# Patient Record
Sex: FEMALE | Race: WHITE | NOT HISPANIC OR LATINO | Employment: OTHER | URBAN - METROPOLITAN AREA
[De-identification: names, ages, dates, MRNs, and addresses within clinical notes are randomized per-mention and may not be internally consistent; named-entity substitution may affect disease eponyms.]

---

## 2018-06-01 LAB — HBA1C MFR BLD HPLC: 10.3 %

## 2019-07-11 PROBLEM — K21.9 LARYNGOPHARYNGEAL REFLUX (LPR): Status: ACTIVE | Noted: 2019-07-11

## 2019-07-11 PROBLEM — R06.83 SNORING: Status: ACTIVE | Noted: 2019-07-11

## 2019-07-11 PROBLEM — H10.13 ALLERGIC CONJUNCTIVITIS OF BOTH EYES: Status: ACTIVE | Noted: 2019-07-11

## 2019-07-11 PROBLEM — R51.9 NONINTRACTABLE HEADACHE: Status: ACTIVE | Noted: 2019-07-11

## 2019-07-11 PROBLEM — J30.9 ALLERGIC RHINITIS: Status: ACTIVE | Noted: 2019-07-11

## 2019-07-11 PROBLEM — L20.84 INTRINSIC ATOPIC DERMATITIS: Status: ACTIVE | Noted: 2019-07-11

## 2019-07-11 PROBLEM — J30.0 VASOMOTOR RHINITIS: Status: ACTIVE | Noted: 2019-07-11

## 2020-01-08 NOTE — PROGRESS NOTES
BMI Counseling: Body mass index is 32 54 kg/m²  The BMI is above normal  Nutrition recommendations include 3-5 servings of fruits/vegetables daily, increasing intake of lean protein and reducing intake of saturated fat and trans fat  Assessment/Plan:    Will discontinue glimepiride and start metformin 1000 b i d  With food  Patient was advised to continue with regular low carb diet  Continue to keep hydrated  UTIs treated with Macrobid twice a day for 5 days patient states no history of medication allergy in the past other than the glimepiride causing worsening of her heartburn  Patient will have a urine culture that is pending at this point will address when results available  Lab work has been ordered today and patient is encouraged to get the blood work done fasting prior to next visit in 4 weeks  Patient was advised to call back or return to office if any worsening of condition  Discussed the plan of care with patient in detail patient and her daughter are both appreciative and agreeable with the plan of care       Problem List Items Addressed This Visit        Cardiovascular and Mediastinum    Benign essential hypertension - Primary    Relevant Orders    CBC and differential    Comprehensive metabolic panel    TSH, 3rd generation with Free T4 reflex    Lipid panel    HEMOGLOBIN A1C W/ EAG ESTIMATION    Microalbumin / creatinine urine ratio       Other    Mixed hyperlipidemia    Relevant Medications    lovastatin (MEVACOR) 20 mg tablet    Other Relevant Orders    CBC and differential    Comprehensive metabolic panel    TSH, 3rd generation with Free T4 reflex    Lipid panel    HEMOGLOBIN A1C W/ EAG ESTIMATION    Microalbumin / creatinine urine ratio      Other Visit Diagnoses     Type 2 diabetes mellitus with other specified complication, without long-term current use of insulin (HCC)        Relevant Medications    metFORMIN (GLUCOPHAGE) 1000 MG tablet    Other Relevant Orders    POCT hemoglobin A1c (Completed)    CBC and differential    Comprehensive metabolic panel    TSH, 3rd generation with Free T4 reflex    Lipid panel    HEMOGLOBIN A1C W/ EAG ESTIMATION    Microalbumin / creatinine urine ratio    Ambulatory referral to Ophthalmology    Acute cystitis without hematuria        Relevant Medications    nitrofurantoin (MACROBID) 100 mg capsule    Other Relevant Orders    POCT urine dip auto non-scope (Completed)    Urine culture    Gastroesophageal reflux disease, esophagitis presence not specified        Relevant Medications    pantoprazole (PROTONIX) 20 mg tablet    Other Relevant Orders    CBC and differential    Comprehensive metabolic panel    TSH, 3rd generation with Free T4 reflex    Lipid panel    HEMOGLOBIN A1C W/ EAG ESTIMATION    Microalbumin / creatinine urine ratio            Subjective:      Patient ID: Blanka Leger is a 80 y o  female  Patient is here to establish care with new PCP today  Past medical history significant for diabetes mellitus type 2 hypertension hyperlipidemia gastroesophageal reflux disease  She [facility] generalized primary care needs in the past   Today I do not have any records available for patient  Her concerns today are:   feels that the diabetic medication glimepiride 4 mg which she takes twice a day gives her severe heartburn and that has been ongoing since she has started this medication  Blood sugars are controlled however A1c today as point of care is 9 8   patient would like to have her medication readjusted or switched today she states that even taking a acid reducing medication over-the-counter has not helped to date  Her other issue is worsening of the reflux with the symptoms on daily basis she has been taking the acid medication as mentioned daily and she is does not recall the name of it but a she states that over the counter    She does not recall any stomach pain she also does not recall having any change in bowel habits she does not have any blood in stools either  The following portions of the patient's history were reviewed and updated as appropriate:   She  has a past medical history of Arthritis, Diabetes ([address] ), Glaucoma, Headache, Heartburn, Hypercholesteremia, Hypertension, Hypothyroidism, Migraines, Night sweats, and Weight gain  She   Patient Active Problem List    Diagnosis Date Noted    Allergic conjunctivitis of both eyes 07/11/2019    Allergic rhinitis 07/11/2019    Vasomotor rhinitis 07/11/2019    Nonintractable headache 07/11/2019    Laryngopharyngeal reflux (LPR) 07/11/2019    Snoring 07/11/2019    Intrinsic atopic dermatitis 07/11/2019    Benign essential hypertension 02/27/2012    Mixed hyperlipidemia 02/27/2012    Type II or unspecified type diabetes mellitus without mention of complication, not stated as uncontrolled 02/27/2012     She  has a past surgical history that includes [place]o tooth extraction; Tubal ligation; Cholecystectomy; and Tumor removal (Left)  Her family history includes Allergies in her daughter; Diabetes in her daughter; No Known Problems in her daughter, daughter, daughter, sister, sister, and son; Other in her brother, brother, and mother; Rheumatic fever in her father  She  reports that she has never smoked  She has never used smokeless tobacco  She reports that she drank alcohol  She reports that she does not use drugs    Current Outpatient Medications   Medication Sig Dispense Refill    amLODIPine (NORVASC) 10 mg tablet Take 10 mg by mouth daily  2    FREESTYLE LITE test strip 3 (three) times a day Test  1    levothyroxine 25 mcg tablet Take 25 mcg by mouth daily      lovastatin (MEVACOR) 20 mg tablet       metFORMIN (GLUCOPHAGE) 1000 MG tablet Take 1 tablet (1,000 mg total) by mouth 2 (two) times a day with meals 60 tablet 2    nitrofurantoin (MACROBID) 100 mg capsule Take 1 capsule (100 mg total) by mouth 2 (two) times a day for 5 days 10 capsule 0    pantoprazole (PROTONIX) 20 mg tablet Take 1 tablet (20 mg total) by mouth daily before breakfast 30 tablet 2     No current facility-administered medications for this visit  Current Outpatient Medications on File Prior to Visit   Medication Sig    amLODIPine (NORVASC) 10 mg tablet Take 10 mg by mouth daily    FREESTYLE LITE test strip 3 (three) times a day Test    levothyroxine 25 mcg tablet Take 25 mcg by mouth daily    lovastatin (MEVACOR) 20 mg tablet     [DISCONTINUED] glimepiride (AMARYL) 4 mg tablet Take 8 mg by mouth daily    [DISCONTINUED] azelastine (ASTELIN) 0 1 % nasal spray 1 spray into each nostril 2 (two) times a day Use in each nostril as directed (Patient not taking: Reported on 1/9/2020)    [DISCONTINUED] fenofibrate (TRICOR) 145 mg tablet Take 1 tablet by mouth daily    [DISCONTINUED] gabapentin (NEURONTIN) 100 mg capsule Take by mouth     No current facility-administered medications on file prior to visit  She is allergic to glimepiride       Review of Systems   Constitutional: Negative for chills, fatigue, fever and unexpected weight change  HENT: Negative for congestion  Respiratory: Negative for cough and shortness of breath  Cardiovascular: Negative for chest pain and leg swelling  Gastrointestinal: Negative for abdominal pain, nausea and vomiting  Has GERD and heartburns that is getting worse  Endocrine: Negative for polydipsia, polyphagia and polyuria  Musculoskeletal: Negative for arthralgias and myalgias  Skin: Negative for rash  Neurological: Negative for tremors, weakness, light-headedness and headaches  Objective:      /78 (BP Location: Left arm, Patient Position: Sitting, Cuff Size: Large)   Pulse 62   Temp 98 9 °F (37 2 °C) (Tympanic)   Ht 4' 9 5" (1 461 m)   Wt 69 4 kg (153 lb)   SpO2 95%   BMI 32 54 kg/m²          Physical Exam   Constitutional: She is oriented to person, place, and time   She appears well-developed and well-nourished  No distress  HENT:   Head: Normocephalic and atraumatic  Eyes: Right eye exhibits no discharge  Left eye exhibits no discharge  No scleral icterus  Neck: Normal range of motion  Neck supple  Cardiovascular: Normal rate, regular rhythm and normal heart sounds  No murmur heard  Pulmonary/Chest: Effort normal and breath sounds normal  No respiratory distress  She has no wheezes  Abdominal: Soft  Bowel sounds are normal  She exhibits no distension  There is no tenderness  Musculoskeletal: Normal range of motion  Neurological: She is alert and oriented to person, place, and time  Skin: Skin is warm and dry  No rash noted  She is not diaphoretic  Psychiatric: She has a normal mood and affect  Her behavior is normal    Nursing note and vitals reviewed          Lab Results   Component Value Date    HGBA1C 9 8 (A) 01/09/2020

## 2020-01-09 ENCOUNTER — OFFICE VISIT (OUTPATIENT)
Dept: FAMILY MEDICINE CLINIC | Facility: OTHER | Age: 85
End: 2020-01-09
Payer: MEDICARE

## 2020-01-09 VITALS
HEART RATE: 62 BPM | WEIGHT: 153 LBS | TEMPERATURE: 98.9 F | BODY MASS INDEX: 32.12 KG/M2 | OXYGEN SATURATION: 95 % | SYSTOLIC BLOOD PRESSURE: 138 MMHG | HEIGHT: 58 IN | DIASTOLIC BLOOD PRESSURE: 78 MMHG

## 2020-01-09 DIAGNOSIS — N30.00 ACUTE CYSTITIS WITHOUT HEMATURIA: ICD-10-CM

## 2020-01-09 DIAGNOSIS — E78.2 MIXED HYPERLIPIDEMIA: ICD-10-CM

## 2020-01-09 DIAGNOSIS — I10 BENIGN ESSENTIAL HYPERTENSION: Primary | ICD-10-CM

## 2020-01-09 DIAGNOSIS — K21.9 GASTROESOPHAGEAL REFLUX DISEASE, ESOPHAGITIS PRESENCE NOT SPECIFIED: ICD-10-CM

## 2020-01-09 DIAGNOSIS — E11.69 TYPE 2 DIABETES MELLITUS WITH OTHER SPECIFIED COMPLICATION, WITHOUT LONG-TERM CURRENT USE OF INSULIN (HCC): ICD-10-CM

## 2020-01-09 LAB
CREAT UR-MCNC: 53.1 MG/DL
MICROALBUMIN UR-MCNC: 24 MG/L (ref 0–20)
MICROALBUMIN/CREAT 24H UR: 45 MG/G CREATININE (ref 0–30)
SL AMB  POCT GLUCOSE, UA: ABNORMAL
SL AMB LEUKOCYTE ESTERASE,UA: ABNORMAL
SL AMB POCT BILIRUBIN,UA: ABNORMAL
SL AMB POCT BLOOD,UA: ABNORMAL
SL AMB POCT CLARITY,UA: ABNORMAL
SL AMB POCT COLOR,UA: YELLOW
SL AMB POCT HEMOGLOBIN AIC: 9.8 (ref ?–6.5)
SL AMB POCT KETONES,UA: ABNORMAL
SL AMB POCT NITRITE,UA: ABNORMAL
SL AMB POCT PH,UA: 5.5
SL AMB POCT SPECIFIC GRAVITY,UA: 1.02
SL AMB POCT URINE PROTEIN: ABNORMAL
SL AMB POCT UROBILINOGEN: ABNORMAL

## 2020-01-09 PROCEDURE — 87077 CULTURE AEROBIC IDENTIFY: CPT | Performed by: FAMILY MEDICINE

## 2020-01-09 PROCEDURE — 87086 URINE CULTURE/COLONY COUNT: CPT | Performed by: FAMILY MEDICINE

## 2020-01-09 PROCEDURE — 99203 OFFICE O/P NEW LOW 30 MIN: CPT | Performed by: FAMILY MEDICINE

## 2020-01-09 PROCEDURE — 87186 SC STD MICRODIL/AGAR DIL: CPT | Performed by: FAMILY MEDICINE

## 2020-01-09 PROCEDURE — 83036 HEMOGLOBIN GLYCOSYLATED A1C: CPT | Performed by: FAMILY MEDICINE

## 2020-01-09 PROCEDURE — 81003 URINALYSIS AUTO W/O SCOPE: CPT | Performed by: FAMILY MEDICINE

## 2020-01-09 PROCEDURE — 82043 UR ALBUMIN QUANTITATIVE: CPT | Performed by: FAMILY MEDICINE

## 2020-01-09 PROCEDURE — 82570 ASSAY OF URINE CREATININE: CPT | Performed by: FAMILY MEDICINE

## 2020-01-09 RX ORDER — LOVASTATIN 20 MG/1
TABLET ORAL
COMMUNITY
Start: 2020-01-01 | End: 2020-09-21 | Stop reason: SDUPTHER

## 2020-01-09 RX ORDER — LEVOTHYROXINE SODIUM 0.03 MG/1
25 TABLET ORAL DAILY
COMMUNITY
End: 2020-09-21 | Stop reason: SDUPTHER

## 2020-01-09 RX ORDER — PANTOPRAZOLE SODIUM 20 MG/1
20 TABLET, DELAYED RELEASE ORAL
Qty: 30 TABLET | Refills: 2 | Status: SHIPPED | OUTPATIENT
Start: 2020-01-09 | End: 2020-09-21 | Stop reason: SDUPTHER

## 2020-01-09 RX ORDER — NITROFURANTOIN 25; 75 MG/1; MG/1
100 CAPSULE ORAL 2 TIMES DAILY
Qty: 10 CAPSULE | Refills: 0 | Status: SHIPPED | OUTPATIENT
Start: 2020-01-09 | End: 2020-01-14

## 2020-01-09 NOTE — PATIENT INSTRUCTIONS
Urinary Traction Infection in Older Adults   WHAT YOU NEED TO KNOW:   A urinary tract infection (UTI) is caused by bacteria that get inside your urinary tract  Your urinary tract includes your kidneys, ureters, bladder, and urethra  Urine is made in your kidneys, and it flows from the ureters to the bladder  Urine leaves the bladder through the urethra  A UTI is more common in your lower urinary tract, which includes your bladder and urethra  DISCHARGE INSTRUCTIONS:   Return to the emergency department if:   · You are urinating very little or not at all  · You are vomiting  · You have a high fever with shaking chills  · You have side or back pain that gets worse  Contact your healthcare provider if:   · You have a fever  · You are a woman and you have increased white or yellow discharge from your vagina  · You do not feel better after 2 days of taking antibiotics  · You have questions or concerns about your condition or care  Medicines:   · Medicines  help treat the bacterial infection or decrease pain and burning when you urinate  You may also need medicines to decrease the urge to urinate often  Your healthcare provider may recommend cranberry juice or cranberry supplements to help decrease your symptoms  · Take your medicine as directed  Contact your healthcare provider if you think your medicine is not helping or if you have side effects  Tell him or her if you are allergic to any medicine  Keep a list of the medicines, vitamins, and herbs you take  Include the amounts, and when and why you take them  Bring the list or the pill bottles to follow-up visits  Carry your medicine list with you in case of an emergency  Self-care:   · Urinate when you feel the urge  Do not hold your urine because bacteria can grow in the bladder if urine stays in the bladder too long  It may be helpful to urinate at least every 3 to 4 hours  · Drink liquids as directed    Liquids can help flush bacteria from your urinary tract  Ask how much liquid to drink each day and which liquids are best for you  You may need to drink more liquids than usual to help flush out the bacteria  Do not drink alcohol, caffeine, and citrus juices  These can irritate your bladder and increase your symptoms  · Apply heat  on your abdomen for 20 to 30 minutes every 2 hours for as many days as directed  Heat helps decrease discomfort and pressure in your bladder  Prevent a UTI:   · Women should wipe front to back  after urinating or having a bowel movement  This may prevent germs from getting into the urinary tract  · Urinate after you have sex  to flush away bacteria that can enter your urinary tract during sex  · Wear cotton underwear and clothes that fit loose  Tight pants and nylon underwear can trap moisture and cause bacteria to grow  Follow up with your healthcare provider as directed:  Write down your questions so you remember to ask them during your visits  © 2017 [address] Information is for End User's use only and may not be sold, redistributed or otherwise used for commercial purposes  All illustrations and images included in CareNotes® are the copyrighted property of [employer]  or [employer]  The above information is an  only  It is not intended as medical advice for individual conditions or treatments  Talk to your doctor, nurse or pharmacist before following any medical regimen to see if it is safe and effective for you

## 2020-01-11 LAB
BACTERIA UR CULT: ABNORMAL
BACTERIA UR CULT: ABNORMAL

## 2020-01-14 ENCOUNTER — TELEPHONE (OUTPATIENT)
Dept: FAMILY MEDICINE CLINIC | Facility: OTHER | Age: 85
End: 2020-01-14

## 2020-01-14 NOTE — TELEPHONE ENCOUNTER
Left message for patient    ----- Message from Nell Ornelas MD sent at 1/13/2020  2:21 PM EST -----  The urine test confirms bladder infection  For now take the antibiotic provided last week and finish the full course and keep hydrated  Thanks

## 2020-01-15 ENCOUNTER — TELEPHONE (OUTPATIENT)
Dept: FAMILY MEDICINE CLINIC | Facility: OTHER | Age: 85
End: 2020-01-15

## 2020-01-15 DIAGNOSIS — R30.0 DYSURIA: Primary | ICD-10-CM

## 2020-01-15 NOTE — TELEPHONE ENCOUNTER
Daughter called and the medication for the UTI is not working said it is the wrong med and she would like to know if a UA culture was done due to that is what she needs again per daughter   Patient is still having problems  Please advise   Thank you

## 2020-01-15 NOTE — TELEPHONE ENCOUNTER
Please advise the patient the urine was sent for culture and it shows the medication to be the correct medicine  If she still has the symptoms after taking the medicine I would suggest she gets checked again  I will order Urine test for her to get done at the lab  Advise also to keep her well hydrated  The lab is ordered today  Thanks

## 2020-01-17 LAB — HBA1C MFR BLD HPLC: 10.1 %

## 2020-01-22 ENCOUNTER — TELEPHONE (OUTPATIENT)
Dept: FAMILY MEDICINE CLINIC | Facility: OTHER | Age: 85
End: 2020-01-22

## 2020-01-23 NOTE — TELEPHONE ENCOUNTER
Patients daughter Gwendolyn Cousin was informed and is a little upset that we did not report results of the urine culture  I assured her that we will call her tomorrow morning once it has been reviewed  Please advise

## 2020-01-24 ENCOUNTER — TELEPHONE (OUTPATIENT)
Dept: FAMILY MEDICINE CLINIC | Facility: OTHER | Age: 85
End: 2020-01-24

## 2020-01-24 DIAGNOSIS — R30.0 DYSURIA: Primary | ICD-10-CM

## 2020-01-24 RX ORDER — SULFAMETHOXAZOLE AND TRIMETHOPRIM 800; 160 MG/1; MG/1
1 TABLET ORAL EVERY 12 HOURS SCHEDULED
Qty: 14 TABLET | Refills: 0 | Status: SHIPPED | OUTPATIENT
Start: 2020-01-24 | End: 2020-01-31

## 2020-01-24 NOTE — TELEPHONE ENCOUNTER
Called and spoke with daughter  Apologized for tardiness on lab results  Informed that most recent (1/17) urine culture results came back still positive for infection (E  coli)  Elmo Martinez should have taken care of it  Not sure why she is still having symptoms (dysuria, frequency)  Glucosuria may be contributing  In any case, daughter informed that we will try retreating with a different antibiotic (Bactrim)-->Rx sent to pharmacy  She was instructed to drink plenty of water and call Mon or Tues if symptoms still no better  Daughter reports no previous AE's from Bactrim

## 2020-01-24 NOTE — TELEPHONE ENCOUNTER
Called and spoke with daughter  Apologized for tardiness on lab results  Informed that most recent (1/17) urine culture results came back still positive for infection (E  coli)  Leta Grove should have taken care of it  Not sure why she is still having symptoms (dysuria, frequency)  Glucosuria may be contributing  In any case, daughter informed that we will try retreating with a different antibiotic (Bactrim)-->Rx sent to pharmacy  She was instructed to drink plenty of water and call Mon or Tues if symptoms still no better

## 2020-01-24 NOTE — TELEPHONE ENCOUNTER
Received another call from patient daughter Christy Luque asking about urine results  Patient took macrobid as prescribed but still having symptoms  Shes upset that we received the results on 1/20 but didn't get a call from our office, she had to call us to get results on 1/22  Result notes from provider do not mention results of urine only blood work  Apologized to daughter and stated would try to have another provider take a look and give her a call back before end of day

## 2020-01-27 NOTE — TELEPHONE ENCOUNTER
Staff: The urine culture result was resulted separate time from the rest of  Labs  The culture results were communicated per the telephone note in the encounters by Donald Lance on 1/15/20 to patient's daughter  I had ordered a repeat urine testing on that day to have her checked for residual infection  Annmarie Shelby: Thanks for addressing the lab

## 2020-02-26 ENCOUNTER — APPOINTMENT (OUTPATIENT)
Dept: LAB | Facility: CLINIC | Age: 85
End: 2020-02-26
Payer: MEDICARE

## 2020-02-26 ENCOUNTER — TRANSCRIBE ORDERS (OUTPATIENT)
Dept: LAB | Facility: CLINIC | Age: 85
End: 2020-02-26

## 2020-02-26 ENCOUNTER — OFFICE VISIT (OUTPATIENT)
Dept: FAMILY MEDICINE CLINIC | Facility: OTHER | Age: 85
End: 2020-02-26
Payer: MEDICARE

## 2020-02-26 VITALS
OXYGEN SATURATION: 94 % | HEART RATE: 65 BPM | HEIGHT: 58 IN | BODY MASS INDEX: 32.01 KG/M2 | SYSTOLIC BLOOD PRESSURE: 130 MMHG | WEIGHT: 152.5 LBS | DIASTOLIC BLOOD PRESSURE: 70 MMHG | TEMPERATURE: 99.2 F

## 2020-02-26 DIAGNOSIS — E11.69 TYPE 2 DIABETES MELLITUS WITH OTHER SPECIFIED COMPLICATION, WITHOUT LONG-TERM CURRENT USE OF INSULIN (HCC): Primary | ICD-10-CM

## 2020-02-26 DIAGNOSIS — N30.00 ACUTE CYSTITIS WITHOUT HEMATURIA: ICD-10-CM

## 2020-02-26 LAB
BACTERIA UR QL AUTO: ABNORMAL /HPF
BILIRUB UR QL STRIP: NEGATIVE
CLARITY UR: CLEAR
COLOR UR: YELLOW
GLUCOSE UR STRIP-MCNC: ABNORMAL MG/DL
HGB UR QL STRIP.AUTO: NEGATIVE
KETONES UR STRIP-MCNC: NEGATIVE MG/DL
LEUKOCYTE ESTERASE UR QL STRIP: ABNORMAL
NITRITE UR QL STRIP: NEGATIVE
NON-SQ EPI CELLS URNS QL MICRO: ABNORMAL /HPF
OTHER STN SPEC: ABNORMAL
PH UR STRIP.AUTO: 5.5 [PH]
PROT UR STRIP-MCNC: NEGATIVE MG/DL
RBC #/AREA URNS AUTO: ABNORMAL /HPF
SP GR UR STRIP.AUTO: 1.01 (ref 1–1.03)
UROBILINOGEN UR QL STRIP.AUTO: 0.2 E.U./DL
WBC #/AREA URNS AUTO: ABNORMAL /HPF

## 2020-02-26 PROCEDURE — 99214 OFFICE O/P EST MOD 30 MIN: CPT | Performed by: FAMILY MEDICINE

## 2020-02-26 PROCEDURE — 3078F DIAST BP <80 MM HG: CPT | Performed by: FAMILY MEDICINE

## 2020-02-26 PROCEDURE — 87086 URINE CULTURE/COLONY COUNT: CPT | Performed by: FAMILY MEDICINE

## 2020-02-26 PROCEDURE — 4040F PNEUMOC VAC/ADMIN/RCVD: CPT | Performed by: FAMILY MEDICINE

## 2020-02-26 PROCEDURE — 1036F TOBACCO NON-USER: CPT | Performed by: FAMILY MEDICINE

## 2020-02-26 PROCEDURE — 81001 URINALYSIS AUTO W/SCOPE: CPT | Performed by: FAMILY MEDICINE

## 2020-02-26 PROCEDURE — 3008F BODY MASS INDEX DOCD: CPT | Performed by: FAMILY MEDICINE

## 2020-02-26 PROCEDURE — 3060F POS MICROALBUMINURIA REV: CPT | Performed by: FAMILY MEDICINE

## 2020-02-26 PROCEDURE — 3046F HEMOGLOBIN A1C LEVEL >9.0%: CPT | Performed by: FAMILY MEDICINE

## 2020-02-26 PROCEDURE — 1160F RVW MEDS BY RX/DR IN RCRD: CPT | Performed by: FAMILY MEDICINE

## 2020-02-26 PROCEDURE — 3075F SYST BP GE 130 - 139MM HG: CPT | Performed by: FAMILY MEDICINE

## 2020-02-26 RX ORDER — GLIPIZIDE 5 MG/1
5 TABLET ORAL
Qty: 30 TABLET | Refills: 1 | Status: CANCELLED | OUTPATIENT
Start: 2020-02-26

## 2020-02-26 RX ORDER — GLIMEPIRIDE 4 MG/1
4 TABLET ORAL 2 TIMES DAILY
Qty: 60 TABLET | Refills: 1 | COMMUNITY
Start: 2020-02-26 | End: 2020-03-27

## 2020-02-26 NOTE — PROGRESS NOTES
Assessment/Plan:  Benito Collado was seen today for diabetes  Diagnoses and all orders for this visit:    Type 2 diabetes mellitus with other specified complication, without long-term current use of insulin ([address] )  -     HEMOGLOBIN A1C W/ EAG ESTIMATION; Future  -     Comprehensive metabolic panel; Future  -     sitaGLIPtin (JANUVIA) 25 mg tablet; Take 1 tablet (25 mg total) by mouth daily    Acute cystitis without hematuria  -     UA w Reflex to Microscopic w Reflex to Culture -Lab Collect  -     Urine Microscopic  -     Urine culture; Future  -     Urine culture      BMI Counseling: Body mass index is 32 43 kg/m²  The BMI is above normal  Nutrition recommendations include reducing portion sizes, decreasing overall calorie intake, 3-5 servings of fruits/vegetables daily, reducing fast food intake, consuming healthier snacks, decreasing soda and/or juice intake, moderation in carbohydrate intake and increasing intake of lean protein  Patient is a well appearing 80year old woman who presents for T2DM follow up  Patient admittedly has very poor control of diabetes and last month's A1C was 10 1  Patient is reportedly taking Glimeperide 4mg b i d  She refused to begin Insulin so she will begin Januvia 25mg once daily  Will closely follow  Patient also to go to lab for urine culture today due previously diagnosed cystitis last month as she is still experiencing symptoms  Will follow up result  Patient instructed to begin wearing compressions stockings during the day and to elevate legs throughout the day due to bilateral leg swelling  Return in about 2 weeks (around 3/11/2020) for T2DM   The patient indicates understanding of these issues and agrees with the plan  Subjective:      Patient ID: Virginie Lucio is a 80 y o  female  Diabetes   She presents for her follow-up diabetic visit  She has type 2 diabetes mellitus  No MedicAlert identification noted  Her disease course has been worsening   Hypoglycemia symptoms include dizziness (  upon standing  Chronic issue of unknown origin )  Pertinent negatives for hypoglycemia include no confusion, headaches, hunger, mood changes, nervousness/anxiousness, pallor, seizures, sweats or tremors  Pertinent negatives for diabetes include no blurred vision, no chest pain, no fatigue, no foot paresthesias, no foot ulcerations, no polydipsia, no polyphagia, no polyuria, no visual change, no weakness and no weight loss  There are no hypoglycemic complications  Symptoms are stable  There are no diabetic complications  Risk factors for coronary artery disease include diabetes mellitus, obesity, sedentary lifestyle, post-menopausal, hypertension and dyslipidemia  Current diabetic treatment includes oral agent (monotherapy)  She is compliant with treatment all of the time  Her weight is stable  She is following a generally unhealthy diet  When asked about meal planning, she reported none  She has not had a previous visit with a dietitian  She never participates in exercise  Her home blood glucose trend is increasing rapidly  Her breakfast blood glucose range is generally 180-200 mg/dl  (Occasionally over 200) An ACE inhibitor/angiotensin II receptor blocker is not being taken  She does not see a podiatrist Eye exam is not current  The following portions of the patient's history were reviewed and updated as appropriate: allergies, current medications, past family history, past medical history, past social history, past surgical history and problem list        Review of Systems   Constitutional: Positive for activity change  Negative for appetite change, chills, fatigue, fever and weight loss  HENT: Positive for ear pain  Negative for congestion, ear discharge, rhinorrhea, sneezing and sore throat  Eyes: Negative for blurred vision  Respiratory: Negative for cough, chest tightness and shortness of breath  Cardiovascular: Positive for leg swelling (Bilaterally )   Negative for chest pain and palpitations  Gastrointestinal: Negative for abdominal pain, constipation, diarrhea, nausea and vomiting  Endocrine: Negative for polydipsia, polyphagia and polyuria  Genitourinary: Positive for dysuria  Skin: Negative for pallor  Neurological: Positive for dizziness (  upon standing  Chronic issue of unknown origin )  Negative for tremors, seizures, weakness and headaches  Psychiatric/Behavioral: Negative for confusion and sleep disturbance  The patient is not nervous/anxious  Objective:  /70 (BP Location: Left arm, Patient Position: Sitting, Cuff Size: Large)   Pulse 65   Temp 99 2 °F (37 3 °C) (Tympanic)   Ht 4' 9 5" (1 461 m)   Wt 69 2 kg (152 lb 8 oz)   SpO2 94%   BMI 32 43 kg/m²        Physical Exam   Constitutional: She is oriented to person, place, and time  She appears well-developed and well-nourished  No distress  HENT:   Head: Normocephalic and atraumatic  Left Ear: External ear normal    Nose: Nose normal    Mouth/Throat: Oropharynx is clear and moist    1mm pimple like bump with yellow head in R external ear canal    Eyes: Pupils are equal, round, and reactive to light  Conjunctivae and EOM are normal  Right eye exhibits no discharge  Left eye exhibits no discharge  No scleral icterus  Neck: Normal range of motion  Neck supple  Cardiovascular: Normal rate, regular rhythm, normal heart sounds and intact distal pulses  Pulses are no weak pulses  No murmur heard  Pulses:       Dorsalis pedis pulses are 2+ on the right side, and 2+ on the left side  Posterior tibial pulses are 2+ on the right side, and 2+ on the left side  Pulmonary/Chest: Effort normal and breath sounds normal  No respiratory distress  She has no wheezes  Musculoskeletal: Normal range of motion  She exhibits edema (  1+ pitting edema BL LE)  She exhibits no tenderness or deformity     Feet:   Right Foot:   Skin Integrity: Negative for ulcer, skin breakdown, erythema, warmth, callus or dry skin  Left Foot:   Skin Integrity: Negative for ulcer, skin breakdown, erythema, warmth, callus or dry skin  Neurological: She is alert and oriented to person, place, and time  No cranial nerve deficit  Skin: Skin is warm and dry  Capillary refill takes less than 2 seconds  She is not diaphoretic  Psychiatric: She has a normal mood and affect  Vitals reviewed  Patient's shoes and socks removed  Right Foot/Ankle   Right Foot Inspection  Skin Exam: skin normal and skin intact no dry skin, no warmth, no callus, no erythema, no maceration, no abnormal color, no pre-ulcer, no ulcer and no callus                          Toe Exam: ROM and strength within normal limits  Sensory   Vibration: intact  Proprioception: intact   Monofilament testing: intact  Vascular  Capillary refills: < 3 seconds  The right DP pulse is 2+  The right PT pulse is 2+  Left Foot/Ankle  Left Foot Inspection  Skin Exam: skin normal and skin intactno dry skin, no warmth, no erythema, no maceration, normal color, no pre-ulcer, no ulcer and no callus                         Toe Exam: ROM and strength within normal limits                   Sensory   Vibration: intact  Proprioception: intact  Monofilament: intact  Vascular  Capillary refills: < 3 seconds  The left DP pulse is 2+  The left PT pulse is 2+  Assign Risk Category:  No deformity present; No loss of protective sensation;  No weak pulses       Risk: 0

## 2020-02-27 LAB — BACTERIA UR CULT: NORMAL

## 2020-02-27 RX ORDER — CEPHALEXIN 500 MG/1
500 CAPSULE ORAL 2 TIMES DAILY
Qty: 40 CAPSULE | Refills: 0 | Status: SHIPPED | OUTPATIENT
Start: 2020-02-27 | End: 2020-03-05

## 2020-03-25 ENCOUNTER — TELEPHONE (OUTPATIENT)
Dept: FAMILY MEDICINE CLINIC | Facility: OTHER | Age: 85
End: 2020-03-25

## 2020-03-27 DIAGNOSIS — E11.69 TYPE 2 DIABETES MELLITUS WITH OTHER SPECIFIED COMPLICATION, WITHOUT LONG-TERM CURRENT USE OF INSULIN (HCC): Primary | ICD-10-CM

## 2020-03-27 RX ORDER — GLIMEPIRIDE 4 MG/1
TABLET ORAL
Qty: 60 TABLET | Refills: 1 | Status: SHIPPED | OUTPATIENT
Start: 2020-03-27 | End: 2020-04-24

## 2020-03-30 DIAGNOSIS — E11.69 TYPE 2 DIABETES MELLITUS WITH OTHER SPECIFIED COMPLICATION, WITHOUT LONG-TERM CURRENT USE OF INSULIN (HCC): ICD-10-CM

## 2020-03-30 DIAGNOSIS — K21.9 GASTROESOPHAGEAL REFLUX DISEASE, ESOPHAGITIS PRESENCE NOT SPECIFIED: ICD-10-CM

## 2020-03-30 RX ORDER — PANTOPRAZOLE SODIUM 20 MG/1
20 TABLET, DELAYED RELEASE ORAL
Qty: 90 TABLET | Refills: 0 | OUTPATIENT
Start: 2020-03-30

## 2020-04-06 ENCOUNTER — TELEPHONE (OUTPATIENT)
Dept: FAMILY MEDICINE CLINIC | Facility: CLINIC | Age: 85
End: 2020-04-06

## 2020-04-06 DIAGNOSIS — N39.0 URINARY TRACT INFECTION WITHOUT HEMATURIA, SITE UNSPECIFIED: Primary | ICD-10-CM

## 2020-04-06 RX ORDER — SULFAMETHOXAZOLE AND TRIMETHOPRIM 800; 160 MG/1; MG/1
1 TABLET ORAL 2 TIMES DAILY
Qty: 6 TABLET | Refills: 0 | Status: SHIPPED | OUTPATIENT
Start: 2020-04-06 | End: 2020-04-09

## 2020-04-13 ENCOUNTER — TELEMEDICINE (OUTPATIENT)
Dept: FAMILY MEDICINE CLINIC | Facility: CLINIC | Age: 85
End: 2020-04-13
Payer: MEDICARE

## 2020-04-13 VITALS — TEMPERATURE: 96.6 F | WEIGHT: 160 LBS | HEIGHT: 59 IN | BODY MASS INDEX: 32.25 KG/M2

## 2020-04-13 DIAGNOSIS — Z87.440 HISTORY OF RECURRENT UTI (URINARY TRACT INFECTION): Primary | ICD-10-CM

## 2020-04-13 DIAGNOSIS — N39.0 URINARY TRACT INFECTION, SITE NOT SPECIFIED: ICD-10-CM

## 2020-04-13 PROCEDURE — 99442 PR PHYS/QHP TELEPHONE EVALUATION 11-20 MIN: CPT | Performed by: NURSE PRACTITIONER

## 2020-04-13 RX ORDER — METFORMIN HYDROCHLORIDE 750 MG/1
750 TABLET, EXTENDED RELEASE ORAL DAILY
COMMUNITY
Start: 2020-03-18 | End: 2020-06-09

## 2020-04-13 RX ORDER — SULFAMETHOXAZOLE AND TRIMETHOPRIM 400; 80 MG/1; MG/1
TABLET ORAL
Qty: 12 TABLET | Refills: 0 | Status: SHIPPED | OUTPATIENT
Start: 2020-04-13 | End: 2020-04-16

## 2020-04-20 ENCOUNTER — TELEPHONE (OUTPATIENT)
Dept: FAMILY MEDICINE CLINIC | Facility: OTHER | Age: 85
End: 2020-04-20

## 2020-04-24 DIAGNOSIS — E11.69 TYPE 2 DIABETES MELLITUS WITH OTHER SPECIFIED COMPLICATION, WITHOUT LONG-TERM CURRENT USE OF INSULIN (HCC): ICD-10-CM

## 2020-04-24 RX ORDER — GLIMEPIRIDE 4 MG/1
TABLET ORAL
Qty: 60 TABLET | Refills: 1 | Status: SHIPPED | OUTPATIENT
Start: 2020-04-24 | End: 2020-06-08

## 2020-04-28 ENCOUNTER — TELEPHONE (OUTPATIENT)
Dept: FAMILY MEDICINE CLINIC | Facility: OTHER | Age: 85
End: 2020-04-28

## 2020-05-14 ENCOUNTER — TELEPHONE (OUTPATIENT)
Dept: FAMILY MEDICINE CLINIC | Facility: OTHER | Age: 85
End: 2020-05-14

## 2020-06-06 DIAGNOSIS — E11.69 TYPE 2 DIABETES MELLITUS WITH OTHER SPECIFIED COMPLICATION, WITHOUT LONG-TERM CURRENT USE OF INSULIN (HCC): ICD-10-CM

## 2020-06-08 RX ORDER — GLIMEPIRIDE 4 MG/1
TABLET ORAL
Qty: 60 TABLET | Refills: 1 | Status: SHIPPED | OUTPATIENT
Start: 2020-06-08 | End: 2020-06-10

## 2020-06-09 DIAGNOSIS — E11.9 TYPE 2 DIABETES MELLITUS WITHOUT COMPLICATIONS (HCC): ICD-10-CM

## 2020-06-09 RX ORDER — METFORMIN HYDROCHLORIDE 750 MG/1
TABLET, EXTENDED RELEASE ORAL
Qty: 90 TABLET | Refills: 0 | Status: SHIPPED | OUTPATIENT
Start: 2020-06-09 | End: 2020-09-21

## 2020-06-10 DIAGNOSIS — E11.69 TYPE 2 DIABETES MELLITUS WITH OTHER SPECIFIED COMPLICATION, WITHOUT LONG-TERM CURRENT USE OF INSULIN (HCC): ICD-10-CM

## 2020-06-10 RX ORDER — GLIMEPIRIDE 4 MG/1
TABLET ORAL
Qty: 180 TABLET | Refills: 1 | Status: SHIPPED | OUTPATIENT
Start: 2020-06-10 | End: 2020-09-21 | Stop reason: SDUPTHER

## 2020-07-10 ENCOUNTER — TELEPHONE (OUTPATIENT)
Dept: FAMILY MEDICINE CLINIC | Facility: OTHER | Age: 85
End: 2020-07-10

## 2020-07-20 ENCOUNTER — APPOINTMENT (EMERGENCY)
Dept: VASCULAR ULTRASOUND | Facility: HOSPITAL | Age: 85
End: 2020-07-20
Payer: MEDICARE

## 2020-07-20 ENCOUNTER — HOSPITAL ENCOUNTER (EMERGENCY)
Facility: HOSPITAL | Age: 85
Discharge: HOME | End: 2020-07-20
Payer: MEDICARE

## 2020-07-20 VITALS
RESPIRATION RATE: 20 BRPM | OXYGEN SATURATION: 96 % | DIASTOLIC BLOOD PRESSURE: 58 MMHG | WEIGHT: 147.71 LBS | TEMPERATURE: 97.4 F | SYSTOLIC BLOOD PRESSURE: 172 MMHG | BODY MASS INDEX: 29.83 KG/M2 | HEART RATE: 58 BPM

## 2020-07-20 DIAGNOSIS — S86.912A MUSCLE STRAIN OF LEFT LOWER LEG, INITIAL ENCOUNTER: Primary | ICD-10-CM

## 2020-07-20 LAB
ALBUMIN SERPL BCP-MCNC: 4.1 G/DL (ref 3.4–4.8)
ALP SERPL-CCNC: 59.3 U/L (ref 35–140)
ALT SERPL W P-5'-P-CCNC: 23 U/L (ref 5–54)
ANION GAP SERPL CALCULATED.3IONS-SCNC: 8 MMOL/L (ref 4–13)
APTT PPP: 29 SECONDS (ref 23–31)
AST SERPL W P-5'-P-CCNC: 22 U/L (ref 15–41)
BASOPHILS # BLD AUTO: 0.02 THOUSANDS/ΜL (ref 0–0.1)
BASOPHILS NFR BLD AUTO: 0 % (ref 0–1)
BILIRUB SERPL-MCNC: 0.39 MG/DL (ref 0.3–1.2)
BUN SERPL-MCNC: 12 MG/DL (ref 6–20)
CALCIUM SERPL-MCNC: 10.2 MG/DL (ref 8.4–10.2)
CHLORIDE SERPL-SCNC: 101 MMOL/L (ref 96–108)
CO2 SERPL-SCNC: 26 MMOL/L (ref 22–33)
CREAT SERPL-MCNC: 0.66 MG/DL (ref 0.4–1.1)
D DIMER PPP FEU-MCNC: 0.44 MG/L FEU (ref 0.19–0.49)
EOSINOPHIL # BLD AUTO: 0.21 THOUSAND/ΜL (ref 0–0.61)
EOSINOPHIL NFR BLD AUTO: 2 % (ref 0–6)
ERYTHROCYTE [DISTWIDTH] IN BLOOD BY AUTOMATED COUNT: 12.1 % (ref 11.6–15.1)
GFR SERPL CREATININE-BSD FRML MDRD: 76 ML/MIN/1.73SQ M
GLUCOSE SERPL-MCNC: 141 MG/DL (ref 65–140)
HCT VFR BLD AUTO: 37.9 % (ref 34.8–46.1)
HGB BLD-MCNC: 12.9 G/DL (ref 11.5–15.4)
IMM GRANULOCYTES # BLD AUTO: 0.03 THOUSAND/UL (ref 0–0.2)
IMM GRANULOCYTES NFR BLD AUTO: 0 % (ref 0–2)
INR PPP: 1.03 (ref 0.9–1.1)
LYMPHOCYTES # BLD AUTO: 2.26 THOUSANDS/ΜL (ref 0.6–4.47)
LYMPHOCYTES NFR BLD AUTO: 25 % (ref 14–44)
MCH RBC QN AUTO: 30.6 PG (ref 26.8–34.3)
MCHC RBC AUTO-ENTMCNC: 34 G/DL (ref 31.4–37.4)
MCV RBC AUTO: 90 FL (ref 82–98)
MONOCYTES # BLD AUTO: 1.21 THOUSAND/ΜL (ref 0.17–1.22)
MONOCYTES NFR BLD AUTO: 13 % (ref 4–12)
NEUTROPHILS # BLD AUTO: 5.48 THOUSANDS/ΜL (ref 1.85–7.62)
NEUTS SEG NFR BLD AUTO: 60 % (ref 43–75)
PLATELET # BLD AUTO: 226 THOUSANDS/UL (ref 149–390)
PMV BLD AUTO: 9.5 FL (ref 8.9–12.7)
POTASSIUM SERPL-SCNC: 3.8 MMOL/L (ref 3.5–5)
PROT SERPL-MCNC: 7 G/DL (ref 6.4–8.3)
PROTHROMBIN TIME: 10.9 SECONDS (ref 9.5–12.1)
RBC # BLD AUTO: 4.21 MILLION/UL (ref 3.81–5.12)
SODIUM SERPL-SCNC: 135 MMOL/L (ref 133–145)
WBC # BLD AUTO: 9.21 THOUSAND/UL (ref 4.31–10.16)

## 2020-07-20 PROCEDURE — 80053 COMPREHEN METABOLIC PANEL: CPT

## 2020-07-20 PROCEDURE — 99282 EMERGENCY DEPT VISIT SF MDM: CPT

## 2020-07-20 PROCEDURE — 85730 THROMBOPLASTIN TIME PARTIAL: CPT

## 2020-07-20 PROCEDURE — 85610 PROTHROMBIN TIME: CPT

## 2020-07-20 PROCEDURE — 99284 EMERGENCY DEPT VISIT MOD MDM: CPT

## 2020-07-20 PROCEDURE — 85379 FIBRIN DEGRADATION QUANT: CPT

## 2020-07-20 PROCEDURE — 85025 COMPLETE CBC W/AUTO DIFF WBC: CPT

## 2020-07-20 PROCEDURE — 93971 EXTREMITY STUDY: CPT | Performed by: SURGERY

## 2020-07-20 PROCEDURE — 36415 COLL VENOUS BLD VENIPUNCTURE: CPT

## 2020-07-20 PROCEDURE — 93971 EXTREMITY STUDY: CPT

## 2020-07-20 NOTE — ED PROVIDER NOTES
History  Chief Complaint   Patient presents with    Leg Swelling     pt reports left leg swelling over the past few days, more than usual; reports pain in leg as well; no meds taken; no history of CHF according to family      29-year-old female history of hypertension diabetes presents secondary to 1 day of increasing pain left lower leg with some edema  Patient is here with her daughter daughter states there has been no trauma patient states that the no trauma  There is no signs of outward bruising or wound to the left leg  Patient denies any shortness of breath patient denies any chest pain  Patient denies any significant swelling of the right side  Prior to Admission Medications   Prescriptions Last Dose Informant Patient Reported? Taking?    FREESTYLE LITE test strip  Self Yes Yes   Sig: 3 (three) times a day Test   amLODIPine (NORVASC) 10 mg tablet  Self Yes Yes   Sig: Take 10 mg by mouth daily   glimepiride (AMARYL) 4 mg tablet   No Yes   Sig: TAKE 1 TABLET BY MOUTH TWICE A DAY   levothyroxine 25 mcg tablet  Self Yes Yes   Sig: Take 25 mcg by mouth daily   lovastatin (MEVACOR) 20 mg tablet  Self Yes Yes   metFORMIN (GLUCOPHAGE-XR) 750 mg 24 hr tablet   No Yes   Sig: TAKE 1 TABLET BY MOUTH EVERY DAY   pantoprazole (PROTONIX) 20 mg tablet Not Taking at Unknown time Self No No   Sig: Take 1 tablet (20 mg total) by mouth daily before breakfast   Patient not taking: Reported on 4/13/2020   sitaGLIPtin (JANUVIA) 25 mg tablet   No Yes   Sig: Take 1 tablet (25 mg total) by mouth daily      Facility-Administered Medications: None       Past Medical History:   Diagnosis Date    Arthritis     Diabetes ([facility] [address] )     Type 2    Glaucoma     Headache     Heartburn     Hypercholesteremia     Hypertension     Hypothyroidism     Migraines     Night sweats     Weight gain        Past Surgical History:   Procedure Laterality Date    CHOLECYSTECTOMY      TUBAL LIGATION      TUMOR REMOVAL Left     WISDOM TOOTH EXTRACTION         Family History   Problem Relation Age of Onset    Other Mother         natural causes     Rheumatic fever Father     No Known Problems Sister     Other Brother         natural causes     No Known Problems Sister     Other Brother         natural causes     Diabetes Daughter     Allergies Daughter     No Known Problems Daughter     No Known Problems Daughter     No Known Problems Daughter     No Known Problems Son      I have reviewed and agree with the history as documented  E-Cigarette/Vaping    E-Cigarette Use Never User      E-Cigarette/Vaping Substances     Social History     Tobacco Use    Smoking status: Never Smoker    Smokeless tobacco: Never Used   Substance Use Topics    Alcohol use: Not Currently    Drug use: Never       Review of Systems   Constitutional: Negative for chills and fever  HENT: Negative for congestion  Eyes: Negative for visual disturbance  Respiratory: Negative for shortness of breath  Cardiovascular: Positive for leg swelling  Negative for chest pain  Gastrointestinal: Negative for abdominal pain  Endocrine: Negative for cold intolerance  Genitourinary: Negative for frequency  Musculoskeletal: Negative for gait problem  Skin: Negative for rash  Neurological: Negative for dizziness  Psychiatric/Behavioral: Negative for behavioral problems and confusion  Physical Exam  Physical Exam   Constitutional: She is oriented to person, place, and time  She appears well-developed and well-nourished  HENT:   Head: Normocephalic and atraumatic  Eyes: Pupils are equal, round, and reactive to light  Conjunctivae and EOM are normal    Neck: Normal range of motion  Neck supple  Cardiovascular: Normal rate, regular rhythm and normal heart sounds  Pulmonary/Chest: Effort normal and breath sounds normal    Abdominal: Soft  Bowel sounds are normal    Musculoskeletal: Normal range of motion     Left leg demonstrates what appears to be 1+ edema compared to the right leg with no edema  Patient has no warmth no redness  Patient does have tenderness to palpation over the posterolateral aspect of the left lower leg   Neurological: She is alert and oriented to person, place, and time  Skin: Skin is warm and dry  Capillary refill takes less than 2 seconds  Psychiatric: She has a normal mood and affect  Her behavior is normal    Nursing note and vitals reviewed        Vital Signs  ED Triage Vitals [07/20/20 1442]   Temperature Pulse Respirations Blood Pressure SpO2   (!) 97 4 °F (36 3 °C) 58 20 (!) 172/58 96 %      Temp Source Heart Rate Source Patient Position - Orthostatic VS BP Location FiO2 (%)   Tympanic Monitor -- -- --      Pain Score       --           Vitals:    07/20/20 1442   BP: (!) 172/58   Pulse: 58         Visual Acuity      ED Medications  Medications - No data to display    Diagnostic Studies  Results Reviewed     Procedure Component Value Units Date/Time    Comprehensive metabolic panel [[id number]]  (Abnormal) Collected:  07/20/20 1500    Lab Status:  Final result Specimen:  Blood from Arm, Right Updated:  07/20/20 1533     Sodium 135 mmol/L      Potassium 3 8 mmol/L      Chloride 101 mmol/L      CO2 26 mmol/L      ANION GAP 8 mmol/L      BUN 12 mg/dL      Creatinine 0 66 mg/dL      Glucose 141 mg/dL      Calcium 10 2 mg/dL      AST 22 U/L      ALT 23 U/L      Alkaline Phosphatase 59 3 U/L      Total Protein 7 0 g/dL      Albumin 4 1 g/dL      Total Bilirubin 0 39 mg/dL      eGFR 76 ml/min/1 73sq m     Narrative:       [place] guidelines for Chronic Kidney Disease (CKD):     Stage 1 with normal or high GFR (GFR > 90 mL/min/1 73 square meters)    Stage 2 Mild CKD (GFR = 60-89 mL/min/1 73 square meters)    Stage 3A Moderate CKD (GFR = 45-59 mL/min/1 73 square meters)    Stage 3B Moderate CKD (GFR = 30-44 mL/min/1 73 square meters)    Stage 4 Severe CKD (GFR = 15-29 mL/min/1 73 square meters)   Stage 5 End Stage CKD (GFR <15 mL/min/1 73 square meters)  Note: GFR calculation is accurate only with a steady state creatinine    Protime-INR [[id number]]  (Normal) Collected:  07/20/20 1500    Lab Status:  Final result Specimen:  Blood from Arm, Right Updated:  07/20/20 1532     Protime 10 9 seconds      INR 1 03    Narrative:       INR Reference Ranges:  No Anticoagulant, Normal:           0 9-1 1  Standard Dose, Oral Anticoagulant:  2 0-3 0  High Dose, Oral Anticoagulant:      2 5-3 5    D-dimer, quantitative [[id number]]  (Normal) Collected:  07/20/20 1500    Lab Status:  Final result Specimen:  Blood from Arm, Right Updated:  07/20/20 1532     D-Dimer, Quant  0 44 mg/L FEU     APTT [[id number]]  (Normal) Collected:  07/20/20 1500    Lab Status:  Final result Specimen:  Blood from Arm, Right Updated:  07/20/20 1532     PTT 29 seconds     CBC and differential [[id number]]  (Abnormal) Collected:  07/20/20 1500    Lab Status:  Final result Specimen:  Blood from Arm, Right Updated:  07/20/20 1515     WBC 9 21 Thousand/uL      RBC 4 21 Million/uL      Hemoglobin 12 9 g/dL      Hematocrit 37 9 %      MCV 90 fL      MCH 30 6 pg      MCHC 34 0 g/dL      RDW 12 1 %      MPV 9 5 fL      Platelets 318 Thousands/uL      Neutrophils Relative 60 %      Immat GRANS % 0 %      Lymphocytes Relative 25 %      Monocytes Relative 13 %      Eosinophils Relative 2 %      Basophils Relative 0 %      Neutrophils Absolute 5 48 Thousands/µL      Immature Grans Absolute 0 03 Thousand/uL      Lymphocytes Absolute 2 26 Thousands/µL      Monocytes Absolute 1 21 Thousand/µL      Eosinophils Absolute 0 21 Thousand/µL      Basophils Absolute 0 02 Thousands/µL                  VAS lower limb venous duplex study, unilateral/limited    (Results Pending)              Procedures  Procedures         ED Course                                             MDM    Workup demonstrates normal lab workup    Patient had a Doppler study of the left leg which demonstrated no DVT  Impression is left lower leg strain  Recommend cool packs Tylenol as needed for pain follow-up primary care physician in 1 week if no better  Disposition  Final diagnoses:   Muscle strain of left lower leg, initial encounter     Time reflects when diagnosis was documented in both MDM as applicable and the Disposition within this note     Time User Action Codes Description Comment    7/20/2020  4:14 PM Izabela Stephenson Add [[id number]] Muscle strain of left lower leg, initial encounter       ED Disposition     ED Disposition Condition Date/Time Comment    Discharge Stable Mon Jul 20, 2020  4:13 PM Luci Anthony discharge to home/self care  Follow-up Information     Follow up With Specialties Details Why Contact Info    Virginie Taylor MD Family Medicine Schedule an appointment as soon as possible for a visit in 3 days If symptoms worsen [address]  [address]  [phone]            Patient's Medications   Discharge Prescriptions    No medications on file     No discharge procedures on file      PDMP Review     None          ED Provider  Electronically Signed by           Saundra Parker MD  07/20/20 6332

## 2020-07-20 NOTE — ED NOTES
Patient stating at 88% placed on 2L O2 NC at this time  Stating at 97%        Jackie Jimenez RN  07/20/20 5774 [facility] Yohan Malagon RN  07/20/20 5611

## 2020-09-18 ENCOUNTER — TELEPHONE (OUTPATIENT)
Dept: FAMILY MEDICINE CLINIC | Facility: CLINIC | Age: 85
End: 2020-09-18

## 2020-09-18 NOTE — TELEPHONE ENCOUNTER
Patient's daughter returned call and stated they will be in Monday 9/21/20 for appointment  COVID Pre-Visit Screening     1  Is this a family member screening? Yes  2  Have you traveled outside of your state in the past 2 weeks? No  3  Do you presently have a fever or flu-like symptoms? No  4  Do you have symptoms of an upper respiratory infection like runny nose, sore throat, or cough? No  5  Are you suffering from new headache that you have not had in the past?  No  6  Do you have/have you experienced any new shortness of breath recently? No  7  Do you have any new diarrhea, nausea or vomiting? No  8  Have you been in contact with anyone who has been sick or diagnosed with COVID-19? No  9  Do you have any new loss of taste or smell? No  10  Are you able to wear a mask without a valve for the entire visit? Yes    Made aware to call from [facility] upon arrival for intake    Julisa Del Valle

## 2020-09-21 ENCOUNTER — OFFICE VISIT (OUTPATIENT)
Dept: FAMILY MEDICINE CLINIC | Facility: CLINIC | Age: 85
End: 2020-09-21
Payer: MEDICARE

## 2020-09-21 VITALS
BODY MASS INDEX: 29.84 KG/M2 | WEIGHT: 148 LBS | OXYGEN SATURATION: 97 % | RESPIRATION RATE: 12 BRPM | HEART RATE: 62 BPM | SYSTOLIC BLOOD PRESSURE: 166 MMHG | DIASTOLIC BLOOD PRESSURE: 76 MMHG | TEMPERATURE: 97.8 F | HEIGHT: 59 IN

## 2020-09-21 DIAGNOSIS — Z23 NEED FOR VACCINATION: ICD-10-CM

## 2020-09-21 DIAGNOSIS — H10.12 ALLERGIC CONJUNCTIVITIS OF LEFT EYE: ICD-10-CM

## 2020-09-21 DIAGNOSIS — E55.9 VITAMIN D DEFICIENCY: ICD-10-CM

## 2020-09-21 DIAGNOSIS — E03.8 OTHER SPECIFIED HYPOTHYROIDISM: ICD-10-CM

## 2020-09-21 DIAGNOSIS — E78.2 MIXED HYPERLIPIDEMIA: ICD-10-CM

## 2020-09-21 DIAGNOSIS — K21.9 GASTROESOPHAGEAL REFLUX DISEASE, ESOPHAGITIS PRESENCE NOT SPECIFIED: ICD-10-CM

## 2020-09-21 DIAGNOSIS — E11.69 TYPE 2 DIABETES MELLITUS WITH OTHER SPECIFIED COMPLICATION, WITHOUT LONG-TERM CURRENT USE OF INSULIN (HCC): ICD-10-CM

## 2020-09-21 DIAGNOSIS — E11.9 TYPE 2 DIABETES MELLITUS WITHOUT COMPLICATION, WITHOUT LONG-TERM CURRENT USE OF INSULIN (HCC): ICD-10-CM

## 2020-09-21 DIAGNOSIS — I10 BENIGN ESSENTIAL HYPERTENSION: Primary | ICD-10-CM

## 2020-09-21 LAB — SL AMB POCT HEMOGLOBIN AIC: 8.6 (ref ?–6.5)

## 2020-09-21 PROCEDURE — 99214 OFFICE O/P EST MOD 30 MIN: CPT | Performed by: NURSE PRACTITIONER

## 2020-09-21 RX ORDER — LANCETS 28 GAUGE
EACH MISCELLANEOUS
Qty: 100 EACH | Refills: 11 | Status: SHIPPED | OUTPATIENT
Start: 2020-09-21

## 2020-09-21 RX ORDER — BLOOD-GLUCOSE METER
100 KIT MISCELLANEOUS 3 TIMES DAILY
Qty: 100 EACH | Refills: 11 | Status: SHIPPED | OUTPATIENT
Start: 2020-09-21 | End: 2020-09-21

## 2020-09-21 RX ORDER — LOVASTATIN 20 MG/1
20 TABLET ORAL
Qty: 90 TABLET | Refills: 3 | Status: SHIPPED | OUTPATIENT
Start: 2020-09-21 | End: 2020-12-23 | Stop reason: HOSPADM

## 2020-09-21 RX ORDER — LEVOTHYROXINE SODIUM 0.03 MG/1
25 TABLET ORAL DAILY
Qty: 90 TABLET | Refills: 3 | Status: SHIPPED | OUTPATIENT
Start: 2020-09-21 | End: 2021-07-28 | Stop reason: SDUPTHER

## 2020-09-21 RX ORDER — AMLODIPINE BESYLATE 10 MG/1
10 TABLET ORAL DAILY
Qty: 90 TABLET | Refills: 3 | Status: SHIPPED | OUTPATIENT
Start: 2020-09-21 | End: 2021-07-28 | Stop reason: SDUPTHER

## 2020-09-21 RX ORDER — OLOPATADINE HYDROCHLORIDE 1 MG/ML
1 SOLUTION/ DROPS OPHTHALMIC DAILY
Qty: 5 ML | Refills: 1 | Status: SHIPPED | OUTPATIENT
Start: 2020-09-21 | End: 2020-09-21

## 2020-09-21 RX ORDER — BLOOD-GLUCOSE METER
100 KIT MISCELLANEOUS 3 TIMES DAILY
Qty: 100 EACH | Refills: 11 | Status: SHIPPED | OUTPATIENT
Start: 2020-09-21 | End: 2021-06-30 | Stop reason: HOSPADM

## 2020-09-21 RX ORDER — GLIMEPIRIDE 4 MG/1
4 TABLET ORAL 2 TIMES DAILY
Qty: 180 TABLET | Refills: 3 | Status: SHIPPED | OUTPATIENT
Start: 2020-09-21 | End: 2021-07-28 | Stop reason: SDUPTHER

## 2020-09-21 RX ORDER — OLOPATADINE HYDROCHLORIDE 7 MG/ML
1 SOLUTION OPHTHALMIC
Qty: 5 ML | Refills: 1 | Status: SHIPPED | OUTPATIENT
Start: 2020-09-21 | End: 2021-06-30 | Stop reason: HOSPADM

## 2020-09-21 RX ORDER — PANTOPRAZOLE SODIUM 20 MG/1
20 TABLET, DELAYED RELEASE ORAL
Qty: 90 TABLET | Refills: 3 | Status: SHIPPED | OUTPATIENT
Start: 2020-09-21 | End: 2021-06-30 | Stop reason: HOSPADM

## 2020-09-21 NOTE — PROGRESS NOTES
Assessment/Plan:  Have fasting blood work drawn as ordered  Will follow pending results           Problem List Items Addressed This Visit        Digestive    Gastroesophageal reflux disease    Relevant Medications    pantoprazole (PROTONIX) 20 mg tablet       Endocrine    Diabetes mellitus (HCC)    Relevant Medications    glimepiride (AMARYL) 4 mg tablet    lovastatin (MEVACOR) 20 mg tablet    sitaGLIPtin (JANUVIA) 25 mg tablet    Lancets (freestyle) lancets    FREESTYLE LITE test strip    Other Relevant Orders    Lancets    CBC and differential    Comprehensive metabolic panel    Lipid panel    TSH, 3rd generation with Free T4 reflex    UA w Reflex to Microscopic w Reflex to Culture    Microalbumin / creatinine urine ratio    Type 2 diabetes mellitus without complications (HCC)    Relevant Medications    glimepiride (AMARYL) 4 mg tablet    levothyroxine 25 mcg tablet    lovastatin (MEVACOR) 20 mg tablet    sitaGLIPtin (JANUVIA) 25 mg tablet    Lancets (freestyle) lancets    FREESTYLE LITE test strip    Other Relevant Orders    Lancets    Other specified hypothyroidism    Relevant Medications    levothyroxine 25 mcg tablet       Cardiovascular and Mediastinum    Benign essential hypertension - Primary    Relevant Medications    amLODIPine (NORVASC) 10 mg tablet    levothyroxine 25 mcg tablet    lovastatin (MEVACOR) 20 mg tablet    FREESTYLE LITE test strip    Other Relevant Orders    CBC and differential    Comprehensive metabolic panel    Lipid panel    TSH, 3rd generation with Free T4 reflex    UA w Reflex to Microscopic w Reflex to Culture    Microalbumin / creatinine urine ratio       Other    Mixed hyperlipidemia    Relevant Medications    amLODIPine (NORVASC) 10 mg tablet    levothyroxine 25 mcg tablet    lovastatin (MEVACOR) 20 mg tablet    FREESTYLE LITE test strip    Other Relevant Orders    CBC and differential    Comprehensive metabolic panel    Lipid panel    TSH, 3rd generation with Free T4 reflex    UA w Reflex to Microscopic w Reflex to Culture    BMI 29 0-29 9,adult    Relevant Medications    amLODIPine (NORVASC) 10 mg tablet    levothyroxine 25 mcg tablet    lovastatin (MEVACOR) 20 mg tablet    FREESTYLE LITE test strip    Other Relevant Orders    CBC and differential    Comprehensive metabolic panel    Lipid panel    TSH, 3rd generation with Free T4 reflex    UA w Reflex to Microscopic w Reflex to Culture    Allergic conjunctivitis of left eye    Relevant Medications    olopatadine (PATANOL) 0 1 % ophthalmic solution    RESOLVED: Need for vaccination      Other Visit Diagnoses     Vitamin D deficiency        Relevant Orders    Vitamin D 25 hydroxy            Subjective:      Patient ID: Kira Hung is a 80 y o  female  This 26-year-old female presents today with daughter POA  for medication refill  She has history significant for hypertension, DM type 2, high cholesterol, and allergy eye  She has not had blood work done in over a year  She did get her flu vaccination at [facility] this season  She complains of left watery eye  The following portions of the patient's history were reviewed and updated as appropriate:   She has a past medical history of Arthritis, Diabetes ([address] ), Glaucoma, Headache, Heartburn, Hypercholesteremia, Hypertension, Hypothyroidism, Migraines, Night sweats, and Weight gain  ,  does not have any pertinent problems on file  ,   has a past surgical history that includes [place] tooth extraction; Tubal ligation; Cholecystectomy; and Tumor removal (Left)  ,  family history includes Allergies in her daughter; Diabetes in her daughter; No Known Problems in her daughter, daughter, daughter, sister, sister, and son; Other in her brother, brother, and mother; Rheumatic fever in her father  ,   reports that she has never smoked  She has never used smokeless tobacco  She reports previous alcohol use  She reports that she does not use drugs  ,  has No Known Allergies     Current Outpatient Medications   Medication Sig Dispense Refill    amLODIPine (NORVASC) 10 mg tablet Take 1 tablet (10 mg total) by mouth daily 90 tablet 3    FREESTYLE LITE test strip 100 each by Other route 3 (three) times a day Test 100 each 11    glimepiride (AMARYL) 4 mg tablet Take 1 tablet (4 mg total) by mouth 2 (two) times a day 180 tablet 3    levothyroxine 25 mcg tablet Take 1 tablet (25 mcg total) by mouth daily 90 tablet 3    lovastatin (MEVACOR) 20 mg tablet Take 1 tablet (20 mg total) by mouth daily at bedtime 90 tablet 3    sitaGLIPtin (JANUVIA) 25 mg tablet Take 1 tablet (25 mg total) by mouth daily 90 tablet 3    Lancets (freestyle) lancets Use as instructed 100 each 11    olopatadine (PATANOL) 0 1 % ophthalmic solution Administer 1 drop into the left eye daily 5 mL 1    pantoprazole (PROTONIX) 20 mg tablet Take 1 tablet (20 mg total) by mouth daily before breakfast 90 tablet 3     No current facility-administered medications for this visit  Review of Systems   Constitutional: Negative  HENT: Negative  Eyes: Negative  Respiratory: Negative  Cardiovascular: Negative  Gastrointestinal: Negative  Endocrine: Negative  Genitourinary: Negative  Musculoskeletal: Negative  Skin: Negative  Allergic/Immunologic: Positive for environmental allergies  Neurological: Negative  Hematological: Negative  Psychiatric/Behavioral: Negative  All other systems reviewed and are negative  Objective:  Vitals:    09/21/20 1527   BP: 166/76   BP Location: Left arm   Patient Position: Sitting   Cuff Size: Standard   Pulse: 62   Resp: 12   Temp: 97 8 °F (36 6 °C)   TempSrc: Temporal   SpO2: 97%   Weight: 67 1 kg (148 lb)   Height: 4' 10 5" (1 486 m)     Body mass index is 30 41 kg/m²  Physical Exam  Vitals signs and nursing note reviewed  Constitutional:       Appearance: Normal appearance  She is well-developed and normal weight     HENT:      Head: Normocephalic and atraumatic  Nose: Nose normal    Eyes:      Conjunctiva/sclera: Conjunctivae normal       Pupils: Pupils are equal, round, and reactive to light  Neck:      Musculoskeletal: Normal range of motion and neck supple  Thyroid: No thyromegaly  Cardiovascular:      Rate and Rhythm: Normal rate and regular rhythm  Heart sounds: Normal heart sounds  No murmur  No friction rub  No gallop  Pulmonary:      Effort: Pulmonary effort is normal       Breath sounds: Normal breath sounds  Abdominal:      General: Bowel sounds are normal       Palpations: Abdomen is soft  Musculoskeletal: Normal range of motion  Lymphadenopathy:      Cervical: No cervical adenopathy  Skin:     General: Skin is warm and dry  Capillary Refill: Capillary refill takes less than 2 seconds  Neurological:      General: No focal deficit present  Mental Status: She is alert and oriented to person, place, and time  Mental status is at baseline  Psychiatric:         Mood and Affect: Mood normal          Behavior: Behavior normal          Thought Content:  Thought content normal          Judgment: Judgment normal

## 2020-09-21 NOTE — PATIENT INSTRUCTIONS
Basic Carbohydrate Counting   AMBULATORY CARE:   Carbohydrate counting  is a way to plan your meals by counting the amount of carbohydrate in foods  Carbohydrates are the sugars, starches, and fiber found in fruit, grains, vegetables, and milk products  Carbohydrates increase your blood sugar levels  Carbohydrate counting can help you eat the right amount of carbohydrate to keep your blood sugar levels under control  What you need to know about planning meals using carbohydrate counting:  · A dietitian or healthcare provider will help you develop a healthy meal plan that works best for you  You will be taught how much carbohydrate to eat or drink for each meal and snack  Your meal plan will be based on your age, weight, usual food intake, and physical activity level  If you have diabetes, it will also include your blood sugar levels and diabetes medicine  Once you know how much carbohydrate you should eat, you can decide what type of food you want to eat  · You will need to know what foods contain carbohydrate and how much they contain  Keep track of the amount of carbohydrate in meals and snacks in order to follow your meal plan  Do not avoid carbohydrates or skip meals  Your blood sugar may fall too low if you do not eat enough carbohydrate or you skip meals  Foods that contain carbohydrate:   · Breads:  Each serving of food listed below contains about 15 g of carbohydrate   ¨ 1 slice of bread (1 ounce) or 1 flour or corn tortilla (6 inch)    ¨ ½ of a hamburger bun or ¼ of a large bagel (about 1 ounce)    ¨ 1 pancake (about 4 inches across and ¼ inch thick)    · Cereals and grains:  Serving sizes of ready-to-eat cereals vary  Look at the serving size and the total carbohydrate amount listed on the food label  Each serving of food listed below contains about 15 g of carbohydrate       ¨ ¾ cup of dry, unsweetened, ready-to-eat cereal or ¼ cup of low-fat granola     ¨ ½ cup of oatmeal or other cooked cereal ¨ ? cup of cooked rice or pasta    · Starchy vegetables and beans:  Each serving of food listed below contains about 15 g of carbohydrate   ¨ ½ cup of corn, green peas, sweet potatoes, or mashed potatoes    ¨ ¼ of a large baked potato    ¨ ½ cup of beans, lentils, and peas (garbanzo, goldstein, kidney, white, split, black-eyed)    · Crackers and snacks:  Each serving of food listed below contains about 15 g of carbohydrate   ¨ 3 antione cracker squares or 8 animal crackers     ¨ 6 saltine-type crackers    ¨ 3 cups of popcorn or ¾ ounce of pretzels, potato chips, or tortilla chips    · Fruit:  Each serving of food listed below contains about 15 g of carbohydrate   ¨ 1 small (4 ounce) piece of fresh fruit or ¾ to 1 cup of fresh fruit    ¨ ½ cup of canned or frozen fruit, packed in natural juice    ¨ ½ cup (4 ounces) of unsweetened fruit juice    ¨ 2 tablespoons of dried fruit    · Desserts or sugary foods:  Each serving of food listed below contains about 15 g of carbohydrate   ¨ 2-inch square unfrosted cake or brownie     ¨ 2 small cookies    ¨ ½ cup of ice cream, frozen yogurt, or nondairy frozen yogurt    ¨ ¼ cup of sherbet or sorbet    ¨ 1 tablespoon of regular syrup, jam, or jelly    ¨ 2 tablespoons of light syrup    · Milk and yogurt:  Foods from the milk group contain about 12 g of carbohydrate per serving  ¨ 1 cup of fat-free or low-fat milk    ¨ 1 cup of soy milk    ¨ ? cup of fat-free, yogurt sweetened with artificial sweetener    · Non-starchy vegetables:  Each serving contains about 5 g of carbohydrate   Three servings of non-starch vegetables count as 1 carbohydrate serving  ¨ ½ cup of cooked vegetables or 1 cup of raw vegetables  This includes beets, broccoli, cabbage, cauliflower, cucumber, mushrooms, tomatoes, and zucchini    ¨ ½ cup of vegetable juice  How to use carbohydrate counting to plan meals:   · Count carbohydrate amounts using serving sizes:      ¨ Pasta dinner example:   You plan to have pasta, tossed salad, and an 8-ounce glass of milk  Your healthcare provider tells you that you may have 4 carbohydrate servings for dinner  One carbohydrate serving of pasta is ? cup  One cup of pasta will equal 3 carbohydrate servings  An 8-ounce glass of milk will count as 1 carbohydrate serving  These amounts of food would equal 4 carbohydrate servings  One cup of tossed salad does not count toward your carbohydrate servings  · Count carbohydrate amounts using food labels:  Find the total amount of carbohydrate in a packaged food by reading the food label  Food labels tell you the serving size of the food and the total carbohydrate amount in each serving  Find the serving size on the food label and then decide how many servings you will eat  Multiply the number of servings you plan to eat by the carbohydrate amount per serving  ¨ Granola bar snack example: Your meal plan allows you to have 2 carbohydrate servings (30 grams) of carbohydrate for a snack  You plan to eat 1 package of granola bars, which contains 2 bars  According to the food label, the serving size of food in this package is 1 bar  Each serving (1 bar) contains 25 grams of carbohydrate  The total amount of carbohydrate in this package of granola bars would be 50 g  Based on your meal plan, you should eat only 1 bar  Follow up with your healthcare provider as directed:  Write down your questions so you remember to ask them during your visits  © 2017 [address] Information is for End User's use only and may not be sold, redistributed or otherwise used for commercial purposes  All illustrations and images included in CareNotes® are the copyrighted property of [employer]  or Wilton Crespo  The above information is an  only  It is not intended as medical advice for individual conditions or treatments   Talk to your doctor, nurse or pharmacist before following any medical regimen to see if it is safe and effective for you  Heart Healthy Diet   AMBULATORY CARE:   A heart healthy diet  is an eating plan low in total fat, unhealthy fats, and sodium (salt)  A heart healthy diet helps decrease your risk for heart disease and stroke  Limit the amount of fat you eat to 25% to 35% of your total daily calories  Limit sodium to less than 2,300 mg each day  Healthy fats:  Healthy fats can help improve cholesterol levels  The risk for heart disease is decreased when cholesterol levels are normal  Choose healthy fats, such as the following:  · Unsaturated fat  is found in foods such as soybean, canola, olive, corn, and safflower oils  It is also found in soft tub margarine that is made with liquid vegetable oil  · Omega-3 fat  is found in certain fish, such as salmon, tuna, and trout, and in walnuts and flaxseed  Unhealthy fats:  Unhealthy fats can cause unhealthy cholesterol levels in your blood and increase your risk of heart disease  Limit unhealthy fats, such as the following:  · Cholesterol  is found in animal foods, such as eggs and lobster, and in dairy products made from whole milk  Limit cholesterol to less than 300 milligrams (mg) each day  You may need to limit cholesterol to 200 mg each day if you have heart disease  · Saturated fat  is found in meats, such as bahena and hamburger  It is also found in chicken or turkey skin, whole milk, and butter  Limit saturated fat to less than 7% of your total daily calories  Limit saturated fat to less than 6% if you have heart disease or are at increased risk for it  · Trans fat  is found in packaged foods, such as potato chips and cookies  It is also in hard margarine, some fried foods, and shortening  Avoid trans fats as much as possible    Heart healthy foods and drinks to include:  Ask your dietitian or healthcare provider how many servings to have from each of the following food groups:  · Grains:      ¨ Whole-wheat breads, cereals, and pastas, and brown rice    ¨ Low-fat, low-sodium crackers and chips    · Vegetables:      ¨ Broccoli, green beans, green peas, and spinach    ¨ Collards, kale, and lima beans    ¨ Carrots, sweet potatoes, tomatoes, and peppers    ¨ Canned vegetables with no salt added    · Fruits:      ¨ Bananas, peaches, pears, and pineapple    ¨ Grapes, raisins, and dates    ¨ Oranges, tangerines, grapefruit, orange juice, and grapefruit juice    ¨ Apricots, mangoes, melons, and papaya    ¨ Raspberries and strawberries    ¨ Canned fruit with no added sugar    · Low-fat dairy products:      ¨ Nonfat (skim) milk, 1% milk, and low-fat almond, cashew, or soy milks fortified with calcium    ¨ Low-fat cheese, regular or frozen yogurt, and cottage cheese    · Meats and proteins , such as lean cuts of beef and pork (loin, leg, round), skinless chicken and turkey, legumes, soy products, egg whites, and nuts  Foods and drinks to limit or avoid:  Ask your dietitian or healthcare provider about these and other foods that are high in unhealthy fat, sodium, and sugar:  · Snack or packaged foods , such as frozen dinners, cookies, macaroni and cheese, and cereals with more than 300 mg of sodium per serving    · Canned or dry mixes  for cakes, soups, sauces, or gravies    · Vegetables with added sodium , such as instant potatoes, vegetables with added sauces, or regular canned vegetables    · Other foods high in sodium , such as ketchup, barbecue sauce, salad dressing, pickles, olives, soy sauce, and miso    · High-fat dairy foods  such as whole or 2% milk, cream cheese, or sour cream, and cheeses     · High-fat protein foods  such as high-fat cuts of beef (T-bone steaks, ribs), chicken or turkey with skin, and organ meats, such as liver    · Cured or smoked meats , such as hot dogs, bahena, and sausage    · Unhealthy fats and oils , such as butter, stick margarine, shortening, and cooking oils such as coconut or palm oil    · Food and drinks high in sugar , such as soft drinks (soda), sports drinks, sweetened tea, candy, cake, cookies, pies, and doughnuts  Other diet guidelines to follow:   · Eat more foods containing omega-3 fats  Eat fish high in omega-3 fats at least 2 times a week  · Limit alcohol  Too much alcohol can damage your heart and raise your blood pressure  Women should limit alcohol to 1 drink a day  Men should limit alcohol to 2 drinks a day  A drink of alcohol is 12 ounces of beer, 5 ounces of wine, or 1½ ounces of liquor  · Choose low-sodium foods  High-sodium foods can lead to high blood pressure  Add little or no salt to food you prepare  Use herbs and spices in place of salt  · Eat more fiber  to help lower cholesterol levels  Eat at least 5 servings of fruits and vegetables each day  Eat 3 ounces of whole-grain foods each day  Legumes (beans) are also a good source of fiber  Lifestyle guidelines:   · Do not smoke  Nicotine and other chemicals in cigarettes and cigars can cause lung and heart damage  Ask your healthcare provider for information if you currently smoke and need help to quit  E-cigarettes or smokeless tobacco still contain nicotine  Talk to your healthcare provider before you use these products  · Exercise regularly  to help you maintain a healthy weight and improve your blood pressure and cholesterol levels  Ask your healthcare provider about the best exercise plan for you  Do not start an exercise program without asking your healthcare provider  Follow up with your healthcare provider as directed:  Write down your questions so you remember to ask them during your visits  © 2017 [address] Information is for End User's use only and may not be sold, redistributed or otherwise used for commercial purposes  All illustrations and images included in CareNotes® are the copyrighted property of [employer]  or Wilton Crespo  The above information is an  only   It is not intended as medical advice for individual conditions or treatments  Talk to your doctor, nurse or pharmacist before following any medical regimen to see if it is safe and effective for you  Diabetes in the Older Adult   WHAT YOU NEED TO KNOW:   What do I need to know if I am an older adult with diabetes? Older adults with diabetes are at risk for heart disease, stroke, kidney disease, blindness, and nerve damage  You may also be at risk for any of the following:  · Poor nutrition or low blood sugar levels    · Confusion or problems with memory, attention, or learning new things    · Trouble controlling urination or frequent urinary tract infections    · Trouble with coordination or balance    · Falls and injuries    · Pain    · Depression    · Open sores on your legs or feet  What are the ABCs of diabetes? The ABCs stand for certain things you can do to manage or prevent problems caused by diabetes:  · A  stands for A1c test   This test shows the average amount of sugar in your blood over the past 2 to 3 months  High levels of sugar in your blood can cause damage to your heart, blood vessels, kidneys, feet, and eyes  Most older adults with diabetes should have an A1c level less than 7 5  Ask your healthcare provider if this A1c goal is right for you  Your provider can help you make changes if your A1c is too high  · B  stands for blood pressure   High blood pressure can increase your risk for a heart attack, stroke, or kidney disease  Most older adults with diabetes should have a systolic blood pressure (first number) of 140  Your diastolic blood pressure (second number) should be below 90  Ask your healthcare provider if these blood pressure goals are right for you  · C  stands for cholesterol   High levels of cholesterol can block your arteries and cause a heart attack or stroke  Ask your healthcare provider what your cholesterol levels should be  · S  stands for stop smoking    Nicotine and other chemicals in cigarettes and cigars can cause lung damage and make it more difficult to manage your diabetes  What can I do to manage the ABCs and prevent problems caused by diabetes? · Check your blood sugar levels as directed  Your healthcare provider will tell you when and how often to check during the day  Your healthcare provider will also tell you what your blood sugar levels should be before and after a meal  You may need to check for ketones in your urine or blood if your level is higher than directed  Write down your results and show them to your healthcare provider  Your provider may use the results to make changes to your medicine, food, or exercise schedules  Ask your healthcare provider for more information about how to treat a high or low blood sugar level  · Follow your meal plan as directed  A dietitian will help you make a meal plan to keep your blood sugar level steady and make sure you get enough nutrition  Do not skip meals  Your blood sugar level may drop too low if you have taken diabetes medicine and do not eat  Ask your healthcare provider about programs in your community that can deliver the meals to your home  · Try to be active for 30 to 60 minutes most days of the week  Exercise can help keep your blood sugar level steady, decrease your risk of heart disease, and help you lose weight  It can also help improve your balance and decrease your risk for falls  Work with your healthcare provider to create an exercise plan  Ask a family member or friend to exercise with you  Start slow and exercise for 5 to 10 minutes at a time  Examples of activities include walking or swimming  Include muscle strengthening activities 2 to 3 days each week  Include balance training 2 to 3 times each week  Activities that help increase balance include yoga and north chi      · Maintain a healthy weight  Ask your healthcare provider how much you should weigh   A healthy weight can help you control your diabetes and prevent heart disease  Ask your provider to help you create a weight loss plan if you are overweight  Together you can set manageable weight loss goals  · Do not smoke  Ask your healthcare provider for information if you currently smoke and need help to quit  Do not use e-cigarettes or smokeless tobacco in place of cigarettes or to help you quit  They still contain nicotine  · Manage stress  Stress may increase your blood sugar level  Deep breathing, muscle relaxation, and music may help you relax  Ask your healthcare provider for more information about these practices  What else can I do to manage my diabetes? · Check your feet every day for sores  Look at your whole foot, including the bottom, and between and under your toes  Check for wounds, corns, and calluses  Use a mirror to see the bottom of your feet  The skin on your feet may be shiny, tight, dry, or darker than normal  Your feet may also be cold and pale  Feel your feet by running your hands along the tops, bottoms, sides, and between your toes  Redness, swelling, and warmth are signs of blood flow problems that can lead to a foot ulcer  Do not try to remove corns or calluses yourself  · Wear medical alert identification  Wear medical alert jewelry or carry a card that says you have diabetes  Ask your healthcare provider where to get these items  · Ask about vaccines  You have a higher risk for serious illness if you get the flu, pneumonia, or hepatitis  Ask your healthcare provider if you should get a flu, pneumonia, shingles, or hepatitis B vaccine, and when to get the vaccine  · Keep all appointments  You may need to return to have your A1c checked every 3 months  You will need to return at least once each year to have your feet checked  You will need an eye exam once a year to check for retinopathy  You will also need urine tests every year to check for kidney problems   You may need tests to monitor for heart disease  Write down your questions so you remember to ask them during your visits  · Get help from family and friends  You may need help checking your blood sugar level, giving insulin injections, or preparing your meals  Ask your family and friends to help you with these tasks  Talk to your healthcare provider if you do not have someone at home to help you  A healthcare provider can come to your home to help you with these tasks  Call 911 for any of the following:   · You have any of the following signs of a stroke:      ¨ Numbness or drooping on one side of your face     ¨ Weakness in an arm or leg    ¨ Confusion or difficulty speaking    ¨ Dizziness, a severe headache, or vision loss    · You have any of the following signs of a heart attack:      ¨ Squeezing, pressure, or pain in your chest that lasts longer than 5 minutes or returns    ¨ Discomfort or pain in your back, neck, jaw, stomach, or arm     ¨ Trouble breathing    ¨ Nausea or vomiting    ¨ Lightheadedness or a sudden cold sweat, especially with chest pain or trouble breathing  When should I seek immediate care? · You have severe abdominal pain, or the pain spreads to your back  You may also be vomiting  · You have trouble staying awake or focusing  · You are shaking or sweating  · You have blurred or double vision  · Your breath has a fruity, sweet smell  · Your breathing is deep and labored, or rapid and shallow  · Your heartbeat is fast and weak  · You fall and get hurt  When should I contact my healthcare provider? · You are vomiting or have diarrhea  · You have an upset stomach and cannot eat the foods on your meal plan  · You feel weak or more tired than usual      · You feel dizzy, have headaches, or are easily irritated  · Your skin is red, warm, dry, or swollen  · You have a wound that does not heal      · You have numbness in your arms or legs       · You have trouble coping with your illness, or you feel anxious or depressed  · You have problems with your memory  · You have changes in your vision  · You have questions or concerns about your condition or care  CARE AGREEMENT:   You have the right to help plan your care  Learn about your health condition and how it may be treated  Discuss treatment options with your caregivers to decide what care you want to receive  You always have the right to refuse treatment  The above information is an  only  It is not intended as medical advice for individual conditions or treatments  Talk to your doctor, nurse or pharmacist before following any medical regimen to see if it is safe and effective for you  © 2017 [facility] [employer] Information is for End User's use only and may not be sold, redistributed or otherwise used for commercial purposes  All illustrations and images included in CareNotes® are the copyrighted property of [employer]  or Wilton Crespo  Hypertension   WHAT YOU NEED TO KNOW:   Hypertension is high blood pressure (BP)  Your BP is the force of your blood moving against the walls of your arteries  Normal BP is less than 120/80  Prehypertension is between 120/80 and 139/89  Hypertension is 140/90 or higher  Hypertension causes your BP to get so high that your heart has to work much harder than normal  This can damage your heart  You can control hypertension with a healthy lifestyle or medicines  A controlled blood pressure helps protect your organs, such as your heart, lungs, brain, and kidneys  DISCHARGE INSTRUCTIONS:   Call 911 for any of the following:   · You have discomfort in your chest that feels like squeezing, pressure, fullness, or pain  · You become confused or have difficulty speaking  · You suddenly feel lightheaded or have trouble breathing  · You have pain or discomfort in your back, neck, jaw, stomach, or arm    Return to the emergency department if:   · You have a severe headache or vision loss  · You have weakness in an arm or leg  Contact your healthcare provider if:   · You feel faint, dizzy, confused, or drowsy  · You have been taking your BP medicine and your BP is still higher than your healthcare provider says it should be  · You have questions or concerns about your condition or care  Medicines: You may  need any of the following:  · Medicine  may be used to help lower your BP  You may need more than one type of medicine  Take the medicine exactly as directed  · Diuretics  help decrease extra fluid that collects in your body  This will help lower your BP  You may urinate more often while you take this medicine  · Cholesterol medicine  helps lower your cholesterol level  A low cholesterol level helps prevent heart disease and makes it easier to control your blood pressure  · Take your medicine as directed  Contact your healthcare provider if you think your medicine is not helping or if you have side effects  Tell him or her if you are allergic to any medicine  Keep a list of the medicines, vitamins, and herbs you take  Include the amounts, and when and why you take them  Bring the list or the pill bottles to follow-up visits  Carry your medicine list with you in case of an emergency  Follow up with your healthcare provider as directed: You will need to return to have your BP checked and to have other lab tests done  Write down your questions so you remember to ask them during your visits  Manage hypertension:  Talk with your healthcare provider about these and other ways to manage hypertension:  · Check your BP at home  Sit and rest for 5 minutes before you take your BP  Extend your arm and support it on a flat surface  Your arm should be at the same level as your heart  Follow the directions that came with your BP monitor  If possible, take at least 2 BP readings each time   Take your BP at least twice a day at the same times each day, such as morning and evening  Keep a record of your BP readings and bring it to your follow-up visits  Ask your healthcare provider what your BP should be  · Limit sodium (salt) as directed  Too much sodium can affect your fluid balance  Check labels to find low-sodium or no-salt-added foods  Some low-sodium foods use potassium salts for flavor  Too much potassium can also cause health problems  Your healthcare provider will tell you how much sodium and potassium are safe for you to have in a day  He or she may recommend that you limit sodium to 2,300 mg a day  · Follow the meal plan recommended by your healthcare provider  A dietitian or your provider can give you more information on low-sodium plans or the DASH (Dietary Approaches to Stop Hypertension) eating plan  The DASH plan is low in sodium, unhealthy fats, and total fat  It is high in potassium, calcium, and fiber  · Exercise to maintain a healthy weight  Exercise at least 30 minutes per day, on most days of the week  This will help decrease your blood pressure  Ask your healthcare provider about the best exercise plan for you  · Decrease stress  This may help lower your BP  Learn ways to relax, such as deep breathing or listening to music  · Limit alcohol  Women should limit alcohol to 1 drink a day  Men should limit alcohol to 2 drinks a day  A drink of alcohol is 12 ounces of beer, 5 ounces of wine, or 1½ ounces of liquor  · Do not smoke  Nicotine and other chemicals in cigarettes and cigars can increase your BP and also cause lung damage  Ask your healthcare provider for information if you currently smoke and need help to quit  E-cigarettes or smokeless tobacco still contain nicotine  Talk to your healthcare provider before you use these products  · Manage any other health conditions you have  Health conditions such as diabetes can increase your risk for hypertension   Follow your healthcare provider's instructions and take all your medicines as directed  © 2017 [address] Information is for End User's use only and may not be sold, redistributed or otherwise used for commercial purposes  All illustrations and images included in CareNotes® are the copyrighted property of A D A M , Inc  or Wilton Crespo  The above information is an  only  It is not intended as medical advice for individual conditions or treatments  Talk to your doctor, nurse or pharmacist before following any medical regimen to see if it is safe and effective for you  Influenza Virus Vaccine (By injection)   Influenza Virus Vaccine (in-floo-EN-za VYE-bessie VAX-een)  Helps prevent infection with influenza (flu) virus  Brand Name(s): Afluria 5700-6464 Formula, Octavia Guillermo 9297-1563 Formula, FluLaval Quadrivalent 9557-8404 Formula, Fluad 6115-8914 Formula, Fluarix Quadrivalent 6564-8714 Formula, Flublok 2298-8989 Formula, Flucelvax Quadrivalent 9675-5977 Formula, Fluvirin 9485-4787 Formula, Fluzone High-Dose 3531-5886 Formula, Fluzone Intradermal Quadrivalent 1700-8944 Formula, Fluzone Quadrivalent 8258-2713 Formula, Medical Provider Single Use EZ Flu Shot 2456-5227, Medical Provider Single Use EZ Flu Shot 0990-4471   There may be other brand names for this medicine  When This Medicine Should Not Be Used: This vaccine is not right for everyone  You should not receive it if you had an allergic reaction to flu vaccine  If you are allergic to eggs, tell the caregiver who is going to give you the injection  Some brands of this vaccine contain egg proteins and could cause an allergic reaction  How to Use This Medicine:   Injectable  · The vaccine is given as a shot into a muscle or into your skin, usually in the shoulder area  The shot could be given in the thigh for babies and young children  · A nurse or other health provider will give you this medicine    · Read and follow the patient instructions that come with this medicine  Talk to your doctor or pharmacist if you have any questions  · A child who is younger than 5years old and who has not had a flu shot before may need 2 shots  The second shot should be given about 1 month after the first   · Missed dose: Most people need only 1 dose of the vaccine  If your child needs a second dose, it is important for the vaccine to be given on schedule  If you must cancel an appointment, make a new one right away  Drugs and Foods to Avoid:   Ask your doctor or pharmacist before using any other medicine, including over-the-counter medicines, vitamins, and herbal products  · Tell your doctor if you are using a medicine or treatment that weakens your immune system, such as a steroid, radiation, or cancer treatment  This vaccine may not work as well if you are also using these medicines  However, your doctor may still want you to get the vaccine because it can give you some protection  Warnings While Using This Medicine:   · Tell your doctor if you are pregnant or breastfeeding or if you have a weak immune system  · Tell your doctor if you ever had an unusual reaction to a flu shot, such as Guillain-Barré syndrome, or if you are allergic to latex  · The flu vaccine may not protect everyone who receives it  This vaccine will not treat flu symptoms if you have already been infected with the virus    Possible Side Effects While Using This Medicine:   Call your doctor right away if you notice any of these side effects:  · Allergic reaction: Itching or hives, swelling in your face or hands, swelling or tingling in your mouth or throat, chest tightness, trouble breathing  · Fainting, dizziness, or lightheadedness  · Fever over 103 degrees F  · Seizures  · Severe muscle weakness  If you notice these less serious side effects, talk with your doctor:   · Headache, muscle pain, tiredness  · Irritability or crying (in a child)  · Redness, pain, swelling, soreness, or a lump where the shot was given  If you notice other side effects that you think are caused by this medicine, tell your doctor  Call your doctor for medical advice about side effects  You may report side effects to FDA at 5-056-FDA-4404  © 2017 [address] Information is for End User's use only and may not be sold, redistributed or otherwise used for commercial purposes  The above information is an  only  It is not intended as medical advice for individual conditions or treatments  Talk to your doctor, nurse or pharmacist before following any medical regimen to see if it is safe and effective for you

## 2020-09-22 ENCOUNTER — TELEPHONE (OUTPATIENT)
Dept: ADMINISTRATIVE | Facility: OTHER | Age: 85
End: 2020-09-22

## 2020-10-01 ENCOUNTER — APPOINTMENT (OUTPATIENT)
Dept: LAB | Facility: HOSPITAL | Age: 85
End: 2020-10-01
Payer: MEDICARE

## 2020-10-01 DIAGNOSIS — E11.69 TYPE 2 DIABETES MELLITUS WITH OTHER SPECIFIED COMPLICATION, WITHOUT LONG-TERM CURRENT USE OF INSULIN (HCC): ICD-10-CM

## 2020-10-01 DIAGNOSIS — E55.9 VITAMIN D DEFICIENCY: ICD-10-CM

## 2020-10-01 DIAGNOSIS — E78.2 MIXED HYPERLIPIDEMIA: ICD-10-CM

## 2020-10-01 DIAGNOSIS — I10 BENIGN ESSENTIAL HYPERTENSION: ICD-10-CM

## 2020-10-01 LAB
25(OH)D3 SERPL-MCNC: 9.1 NG/ML (ref 30–100)
ALBUMIN SERPL BCP-MCNC: 4.4 G/DL (ref 3.4–4.8)
ALP SERPL-CCNC: 68 U/L (ref 35–140)
ALT SERPL W P-5'-P-CCNC: 29 U/L (ref 5–54)
ANION GAP SERPL CALCULATED.3IONS-SCNC: 9 MMOL/L (ref 4–13)
AST SERPL W P-5'-P-CCNC: 25 U/L (ref 15–41)
BASOPHILS # BLD AUTO: 0.04 THOUSANDS/ΜL (ref 0–0.1)
BASOPHILS NFR BLD AUTO: 1 % (ref 0–1)
BILIRUB SERPL-MCNC: 0.46 MG/DL (ref 0.3–1.2)
BUN SERPL-MCNC: 13 MG/DL (ref 6–20)
CALCIUM SERPL-MCNC: 10.2 MG/DL (ref 8.4–10.2)
CHLORIDE SERPL-SCNC: 104 MMOL/L (ref 96–108)
CHOLEST SERPL-MCNC: 158 MG/DL
CO2 SERPL-SCNC: 25 MMOL/L (ref 22–33)
CREAT SERPL-MCNC: 0.68 MG/DL (ref 0.4–1.1)
EOSINOPHIL # BLD AUTO: 0.25 THOUSAND/ΜL (ref 0–0.61)
EOSINOPHIL NFR BLD AUTO: 3 % (ref 0–6)
ERYTHROCYTE [DISTWIDTH] IN BLOOD BY AUTOMATED COUNT: 12.7 % (ref 11.6–15.1)
GFR SERPL CREATININE-BSD FRML MDRD: 76 ML/MIN/1.73SQ M
GLUCOSE P FAST SERPL-MCNC: 177 MG/DL (ref 70–100)
HCT VFR BLD AUTO: 43.5 % (ref 34.8–46.1)
HDLC SERPL-MCNC: 44 MG/DL
HGB BLD-MCNC: 14.8 G/DL (ref 11.5–15.4)
IMM GRANULOCYTES # BLD AUTO: 0.02 THOUSAND/UL (ref 0–0.2)
IMM GRANULOCYTES NFR BLD AUTO: 0 % (ref 0–2)
LDLC SERPL CALC-MCNC: 64 MG/DL (ref 0–100)
LYMPHOCYTES # BLD AUTO: 2.39 THOUSANDS/ΜL (ref 0.6–4.47)
LYMPHOCYTES NFR BLD AUTO: 31 % (ref 14–44)
MCH RBC QN AUTO: 30.6 PG (ref 26.8–34.3)
MCHC RBC AUTO-ENTMCNC: 34 G/DL (ref 31.4–37.4)
MCV RBC AUTO: 90 FL (ref 82–98)
MONOCYTES # BLD AUTO: 0.83 THOUSAND/ΜL (ref 0.17–1.22)
MONOCYTES NFR BLD AUTO: 11 % (ref 4–12)
NEUTROPHILS # BLD AUTO: 4.3 THOUSANDS/ΜL (ref 1.85–7.62)
NEUTS SEG NFR BLD AUTO: 54 % (ref 43–75)
NONHDLC SERPL-MCNC: 114 MG/DL
PLATELET # BLD AUTO: 260 THOUSANDS/UL (ref 149–390)
PMV BLD AUTO: 9.8 FL (ref 8.9–12.7)
POTASSIUM SERPL-SCNC: 4 MMOL/L (ref 3.5–5)
PROT SERPL-MCNC: 7.4 G/DL (ref 6.4–8.3)
RBC # BLD AUTO: 4.84 MILLION/UL (ref 3.81–5.12)
SODIUM SERPL-SCNC: 138 MMOL/L (ref 133–145)
TRIGL SERPL-MCNC: 248.9 MG/DL
TSH SERPL DL<=0.05 MIU/L-ACNC: 3.01 UIU/ML (ref 0.34–5.6)
WBC # BLD AUTO: 7.83 THOUSAND/UL (ref 4.31–10.16)

## 2020-10-01 PROCEDURE — 80053 COMPREHEN METABOLIC PANEL: CPT

## 2020-10-01 PROCEDURE — 85025 COMPLETE CBC W/AUTO DIFF WBC: CPT

## 2020-10-01 PROCEDURE — 82306 VITAMIN D 25 HYDROXY: CPT

## 2020-10-01 PROCEDURE — 80061 LIPID PANEL: CPT

## 2020-10-01 PROCEDURE — 84443 ASSAY THYROID STIM HORMONE: CPT

## 2020-10-01 PROCEDURE — 36415 COLL VENOUS BLD VENIPUNCTURE: CPT

## 2020-10-05 DIAGNOSIS — E55.9 VITAMIN D DEFICIENCY: Primary | ICD-10-CM

## 2020-10-05 DIAGNOSIS — E78.2 MIXED HYPERLIPIDEMIA: ICD-10-CM

## 2020-10-05 RX ORDER — CHLORAL HYDRATE 500 MG
2000 CAPSULE ORAL DAILY
Qty: 180 CAPSULE | Refills: 3 | Status: SHIPPED | OUTPATIENT
Start: 2020-10-05 | End: 2021-06-30 | Stop reason: HOSPADM

## 2020-10-05 RX ORDER — ERGOCALCIFEROL 1.25 MG/1
50000 CAPSULE ORAL WEEKLY
Qty: 12 CAPSULE | Refills: 0 | Status: SHIPPED | OUTPATIENT
Start: 2020-10-05 | End: 2021-01-06 | Stop reason: SDUPTHER

## 2020-11-17 ENCOUNTER — HOSPITAL ENCOUNTER (INPATIENT)
Facility: HOSPITAL | Age: 85
LOS: 2 days | DRG: 065 | End: 2020-11-20
Attending: HOSPITALIST | Admitting: INTERNAL MEDICINE
Payer: MEDICARE

## 2020-11-17 ENCOUNTER — APPOINTMENT (EMERGENCY)
Dept: RADIOLOGY | Facility: HOSPITAL | Age: 85
End: 2020-11-17
Payer: MEDICARE

## 2020-11-17 ENCOUNTER — APPOINTMENT (EMERGENCY)
Dept: CT IMAGING | Facility: HOSPITAL | Age: 85
End: 2020-11-17
Payer: MEDICARE

## 2020-11-17 ENCOUNTER — HOSPITAL ENCOUNTER (OUTPATIENT)
Facility: HOSPITAL | Age: 85
Setting detail: OBSERVATION
End: 2020-11-17
Attending: EMERGENCY MEDICINE | Admitting: INTERNAL MEDICINE
Payer: MEDICARE

## 2020-11-17 VITALS
SYSTOLIC BLOOD PRESSURE: 131 MMHG | RESPIRATION RATE: 16 BRPM | WEIGHT: 146.61 LBS | TEMPERATURE: 97.2 F | HEART RATE: 58 BPM | DIASTOLIC BLOOD PRESSURE: 59 MMHG | OXYGEN SATURATION: 94 % | BODY MASS INDEX: 30.77 KG/M2 | HEIGHT: 58 IN

## 2020-11-17 DIAGNOSIS — R00.2 PALPITATIONS: ICD-10-CM

## 2020-11-17 DIAGNOSIS — I48.91 ATRIAL FIBRILLATION, NEW ONSET (HCC): Primary | ICD-10-CM

## 2020-11-17 DIAGNOSIS — I63.9 CVA (CEREBRAL VASCULAR ACCIDENT) (HCC): ICD-10-CM

## 2020-11-17 DIAGNOSIS — I48.0 PAROXYSMAL ATRIAL FIBRILLATION (HCC): ICD-10-CM

## 2020-11-17 DIAGNOSIS — R55 SYNCOPE: ICD-10-CM

## 2020-11-17 DIAGNOSIS — R00.1 BRADYCARDIA WITH 41-50 BEATS PER MINUTE: Primary | ICD-10-CM

## 2020-11-17 DIAGNOSIS — I63.9 CVA (CEREBRAL VASCULAR ACCIDENT) (HCC): Chronic | ICD-10-CM

## 2020-11-17 DIAGNOSIS — R53.1 WEAKNESS: ICD-10-CM

## 2020-11-17 LAB
ALBUMIN SERPL BCP-MCNC: 4.3 G/DL (ref 3.4–4.8)
ALP SERPL-CCNC: 74.8 U/L (ref 35–140)
ALT SERPL W P-5'-P-CCNC: 25 U/L (ref 5–54)
ANION GAP SERPL CALCULATED.3IONS-SCNC: 11 MMOL/L (ref 4–13)
AST SERPL W P-5'-P-CCNC: 21 U/L (ref 15–41)
BACTERIA UR QL AUTO: NORMAL /HPF
BASOPHILS # BLD AUTO: 0.01 THOUSANDS/ΜL (ref 0–0.1)
BASOPHILS NFR BLD AUTO: 0 % (ref 0–1)
BILIRUB SERPL-MCNC: 0.57 MG/DL (ref 0.3–1.2)
BILIRUB UR QL STRIP: NEGATIVE
BUN SERPL-MCNC: 11 MG/DL (ref 6–20)
CALCIUM SERPL-MCNC: 9.9 MG/DL (ref 8.4–10.2)
CHLORIDE SERPL-SCNC: 100 MMOL/L (ref 96–108)
CK SERPL-CCNC: 85.9 U/L (ref 38–234)
CLARITY UR: ABNORMAL
CO2 SERPL-SCNC: 22 MMOL/L (ref 22–33)
COLOR UR: YELLOW
CREAT SERPL-MCNC: 0.58 MG/DL (ref 0.4–1.1)
EOSINOPHIL # BLD AUTO: 0.03 THOUSAND/ΜL (ref 0–0.61)
EOSINOPHIL NFR BLD AUTO: 0 % (ref 0–6)
ERYTHROCYTE [DISTWIDTH] IN BLOOD BY AUTOMATED COUNT: 12.3 % (ref 11.6–15.1)
FLUAV RNA RESP QL NAA+PROBE: NEGATIVE
FLUBV RNA RESP QL NAA+PROBE: NEGATIVE
GFR SERPL CREATININE-BSD FRML MDRD: 80 ML/MIN/1.73SQ M
GLUCOSE SERPL-MCNC: 187 MG/DL (ref 65–140)
GLUCOSE SERPL-MCNC: 243 MG/DL (ref 65–140)
GLUCOSE UR STRIP-MCNC: ABNORMAL MG/DL
HCT VFR BLD AUTO: 43.2 % (ref 34.8–46.1)
HGB BLD-MCNC: 15.1 G/DL (ref 11.5–15.4)
HGB UR QL STRIP.AUTO: NEGATIVE
IMM GRANULOCYTES # BLD AUTO: 0.02 THOUSAND/UL (ref 0–0.2)
IMM GRANULOCYTES NFR BLD AUTO: 0 % (ref 0–2)
KETONES UR STRIP-MCNC: ABNORMAL MG/DL
LACTATE SERPL-SCNC: 1.6 MMOL/L (ref 0–2)
LEUKOCYTE ESTERASE UR QL STRIP: NEGATIVE
LYMPHOCYTES # BLD AUTO: 1.28 THOUSANDS/ΜL (ref 0.6–4.47)
LYMPHOCYTES NFR BLD AUTO: 16 % (ref 14–44)
MAGNESIUM SERPL-MCNC: 1.9 MG/DL (ref 1.6–2.6)
MCH RBC QN AUTO: 30.9 PG (ref 26.8–34.3)
MCHC RBC AUTO-ENTMCNC: 35 G/DL (ref 31.4–37.4)
MCV RBC AUTO: 89 FL (ref 82–98)
MONOCYTES # BLD AUTO: 0.52 THOUSAND/ΜL (ref 0.17–1.22)
MONOCYTES NFR BLD AUTO: 6 % (ref 4–12)
NEUTROPHILS # BLD AUTO: 6.36 THOUSANDS/ΜL (ref 1.85–7.62)
NEUTS SEG NFR BLD AUTO: 78 % (ref 43–75)
NITRITE UR QL STRIP: NEGATIVE
NON-SQ EPI CELLS URNS QL MICRO: NORMAL /HPF
PH UR STRIP.AUTO: 6 [PH]
PLATELET # BLD AUTO: 226 THOUSANDS/UL (ref 149–390)
PMV BLD AUTO: 9.5 FL (ref 8.9–12.7)
POTASSIUM SERPL-SCNC: 3.5 MMOL/L (ref 3.5–5)
PROT SERPL-MCNC: 7.4 G/DL (ref 6.4–8.3)
PROT UR STRIP-MCNC: ABNORMAL MG/DL
RBC # BLD AUTO: 4.88 MILLION/UL (ref 3.81–5.12)
RBC #/AREA URNS AUTO: NORMAL /HPF
RSV RNA RESP QL NAA+PROBE: NEGATIVE
SARS-COV-2 RNA RESP QL NAA+PROBE: NEGATIVE
SODIUM SERPL-SCNC: 133 MMOL/L (ref 133–145)
SP GR UR STRIP.AUTO: 1.02 (ref 1–1.03)
TROPONIN I SERPL-MCNC: <0.03 NG/ML (ref 0–0.07)
TSH SERPL DL<=0.05 MIU/L-ACNC: 2.06 UIU/ML (ref 0.34–5.6)
UROBILINOGEN UR QL STRIP.AUTO: 0.2 E.U./DL
WBC # BLD AUTO: 8.22 THOUSAND/UL (ref 4.31–10.16)
WBC #/AREA URNS AUTO: NORMAL /HPF

## 2020-11-17 PROCEDURE — 84484 ASSAY OF TROPONIN QUANT: CPT | Performed by: EMERGENCY MEDICINE

## 2020-11-17 PROCEDURE — 96360 HYDRATION IV INFUSION INIT: CPT

## 2020-11-17 PROCEDURE — 99220 PR INITIAL OBSERVATION CARE/DAY 70 MINUTES: CPT | Performed by: PHYSICIAN ASSISTANT

## 2020-11-17 PROCEDURE — 36415 COLL VENOUS BLD VENIPUNCTURE: CPT | Performed by: EMERGENCY MEDICINE

## 2020-11-17 PROCEDURE — 82948 REAGENT STRIP/BLOOD GLUCOSE: CPT

## 2020-11-17 PROCEDURE — 72125 CT NECK SPINE W/O DYE: CPT

## 2020-11-17 PROCEDURE — G1004 CDSM NDSC: HCPCS

## 2020-11-17 PROCEDURE — 93005 ELECTROCARDIOGRAM TRACING: CPT

## 2020-11-17 PROCEDURE — 81001 URINALYSIS AUTO W/SCOPE: CPT | Performed by: EMERGENCY MEDICINE

## 2020-11-17 PROCEDURE — 85025 COMPLETE CBC W/AUTO DIFF WBC: CPT | Performed by: EMERGENCY MEDICINE

## 2020-11-17 PROCEDURE — 70450 CT HEAD/BRAIN W/O DYE: CPT

## 2020-11-17 PROCEDURE — 99285 EMERGENCY DEPT VISIT HI MDM: CPT

## 2020-11-17 PROCEDURE — 81003 URINALYSIS AUTO W/O SCOPE: CPT | Performed by: EMERGENCY MEDICINE

## 2020-11-17 PROCEDURE — 84443 ASSAY THYROID STIM HORMONE: CPT | Performed by: EMERGENCY MEDICINE

## 2020-11-17 PROCEDURE — 99285 EMERGENCY DEPT VISIT HI MDM: CPT | Performed by: EMERGENCY MEDICINE

## 2020-11-17 PROCEDURE — 0241U HB NFCT DS VIR RESP RNA 4 TRGT: CPT | Performed by: EMERGENCY MEDICINE

## 2020-11-17 PROCEDURE — 87040 BLOOD CULTURE FOR BACTERIA: CPT | Performed by: EMERGENCY MEDICINE

## 2020-11-17 PROCEDURE — 83735 ASSAY OF MAGNESIUM: CPT | Performed by: EMERGENCY MEDICINE

## 2020-11-17 PROCEDURE — 83605 ASSAY OF LACTIC ACID: CPT | Performed by: EMERGENCY MEDICINE

## 2020-11-17 PROCEDURE — 71045 X-RAY EXAM CHEST 1 VIEW: CPT

## 2020-11-17 PROCEDURE — 80053 COMPREHEN METABOLIC PANEL: CPT | Performed by: EMERGENCY MEDICINE

## 2020-11-17 PROCEDURE — 82550 ASSAY OF CK (CPK): CPT | Performed by: EMERGENCY MEDICINE

## 2020-11-17 RX ORDER — CLOPIDOGREL BISULFATE 75 MG/1
75 TABLET ORAL DAILY
Status: DISCONTINUED | OUTPATIENT
Start: 2020-11-18 | End: 2020-11-20

## 2020-11-17 RX ORDER — ATORVASTATIN CALCIUM 40 MG/1
40 TABLET, FILM COATED ORAL EVERY EVENING
Status: DISCONTINUED | OUTPATIENT
Start: 2020-11-18 | End: 2020-11-20 | Stop reason: HOSPADM

## 2020-11-17 RX ORDER — ERGOCALCIFEROL 1.25 MG/1
50000 CAPSULE ORAL WEEKLY
Status: DISCONTINUED | OUTPATIENT
Start: 2020-11-18 | End: 2020-11-17 | Stop reason: HOSPADM

## 2020-11-17 RX ORDER — AMLODIPINE BESYLATE 10 MG/1
10 TABLET ORAL DAILY
Status: CANCELLED | OUTPATIENT
Start: 2020-11-18

## 2020-11-17 RX ORDER — ATORVASTATIN CALCIUM 40 MG/1
40 TABLET, FILM COATED ORAL EVERY EVENING
Status: DISCONTINUED | OUTPATIENT
Start: 2020-11-17 | End: 2020-11-17 | Stop reason: HOSPADM

## 2020-11-17 RX ORDER — ATORVASTATIN CALCIUM 40 MG/1
40 TABLET, FILM COATED ORAL EVERY EVENING
Status: CANCELLED | OUTPATIENT
Start: 2020-11-18

## 2020-11-17 RX ORDER — ONDANSETRON 4 MG/1
4 TABLET, ORALLY DISINTEGRATING ORAL ONCE
Status: COMPLETED | OUTPATIENT
Start: 2020-11-17 | End: 2020-11-17

## 2020-11-17 RX ORDER — CLOPIDOGREL BISULFATE 75 MG/1
75 TABLET ORAL DAILY
Qty: 30 TABLET | Refills: 0 | Status: SHIPPED | OUTPATIENT
Start: 2020-11-17 | End: 2020-11-20 | Stop reason: HOSPADM

## 2020-11-17 RX ORDER — ASPIRIN 81 MG/1
162 TABLET, CHEWABLE ORAL ONCE
Status: COMPLETED | OUTPATIENT
Start: 2020-11-17 | End: 2020-11-17

## 2020-11-17 RX ORDER — LEVOTHYROXINE SODIUM 0.03 MG/1
25 TABLET ORAL DAILY
Status: CANCELLED | OUTPATIENT
Start: 2020-11-18

## 2020-11-17 RX ORDER — ASPIRIN 81 MG/1
81 TABLET, CHEWABLE ORAL DAILY
Status: DISCONTINUED | OUTPATIENT
Start: 2020-11-18 | End: 2020-11-17 | Stop reason: HOSPADM

## 2020-11-17 RX ORDER — ONDANSETRON 2 MG/ML
INJECTION INTRAMUSCULAR; INTRAVENOUS
Status: COMPLETED
Start: 2020-11-17 | End: 2020-11-17

## 2020-11-17 RX ORDER — LEVOTHYROXINE SODIUM 0.03 MG/1
25 TABLET ORAL DAILY
Status: DISCONTINUED | OUTPATIENT
Start: 2020-11-18 | End: 2020-11-17 | Stop reason: HOSPADM

## 2020-11-17 RX ORDER — ASPIRIN 81 MG/1
81 TABLET, CHEWABLE ORAL DAILY
Status: DISCONTINUED | OUTPATIENT
Start: 2020-11-18 | End: 2020-11-20 | Stop reason: HOSPADM

## 2020-11-17 RX ORDER — AMLODIPINE BESYLATE 10 MG/1
10 TABLET ORAL DAILY
Status: DISCONTINUED | OUTPATIENT
Start: 2020-11-18 | End: 2020-11-17 | Stop reason: HOSPADM

## 2020-11-17 RX ORDER — PANTOPRAZOLE SODIUM 20 MG/1
20 TABLET, DELAYED RELEASE ORAL
Status: DISCONTINUED | OUTPATIENT
Start: 2020-11-18 | End: 2020-11-20 | Stop reason: HOSPADM

## 2020-11-17 RX ORDER — LEVOTHYROXINE SODIUM 0.03 MG/1
25 TABLET ORAL
Status: DISCONTINUED | OUTPATIENT
Start: 2020-11-18 | End: 2020-11-20 | Stop reason: HOSPADM

## 2020-11-17 RX ORDER — AMLODIPINE BESYLATE 10 MG/1
10 TABLET ORAL DAILY
Status: DISCONTINUED | OUTPATIENT
Start: 2020-11-18 | End: 2020-11-20 | Stop reason: HOSPADM

## 2020-11-17 RX ORDER — ERGOCALCIFEROL 1.25 MG/1
50000 CAPSULE ORAL WEEKLY
Status: DISCONTINUED | OUTPATIENT
Start: 2020-11-18 | End: 2020-11-20 | Stop reason: HOSPADM

## 2020-11-17 RX ORDER — ERGOCALCIFEROL 1.25 MG/1
50000 CAPSULE ORAL WEEKLY
Status: CANCELLED | OUTPATIENT
Start: 2020-11-18

## 2020-11-17 RX ORDER — ASPIRIN 81 MG/1
81 TABLET, CHEWABLE ORAL DAILY
Status: CANCELLED | OUTPATIENT
Start: 2020-11-18

## 2020-11-17 RX ADMIN — ATORVASTATIN CALCIUM 40 MG: 40 TABLET, FILM COATED ORAL at 21:52

## 2020-11-17 RX ADMIN — ENOXAPARIN SODIUM 40 MG: 40 INJECTION SUBCUTANEOUS at 21:52

## 2020-11-17 RX ADMIN — ONDANSETRON 4 MG: 2 INJECTION INTRAMUSCULAR; INTRAVENOUS at 16:56

## 2020-11-17 RX ADMIN — ASPIRIN 162 MG: 81 TABLET, CHEWABLE ORAL at 18:52

## 2020-11-17 RX ADMIN — ONDANSETRON 4 MG: 4 TABLET, ORALLY DISINTEGRATING ORAL at 18:52

## 2020-11-17 RX ADMIN — SODIUM CHLORIDE 500 ML: 0.9 INJECTION, SOLUTION INTRAVENOUS at 17:10

## 2020-11-18 ENCOUNTER — APPOINTMENT (OUTPATIENT)
Dept: NON INVASIVE DIAGNOSTICS | Facility: HOSPITAL | Age: 85
DRG: 065 | End: 2020-11-18
Payer: MEDICARE

## 2020-11-18 ENCOUNTER — APPOINTMENT (INPATIENT)
Dept: MRI IMAGING | Facility: HOSPITAL | Age: 85
DRG: 065 | End: 2020-11-18
Payer: MEDICARE

## 2020-11-18 LAB
ATRIAL RATE: 64 BPM
CHOLEST SERPL-MCNC: 135 MG/DL (ref 50–200)
EST. AVERAGE GLUCOSE BLD GHB EST-MCNC: 214 MG/DL
GLUCOSE SERPL-MCNC: 172 MG/DL (ref 65–140)
GLUCOSE SERPL-MCNC: 185 MG/DL (ref 65–140)
GLUCOSE SERPL-MCNC: 211 MG/DL (ref 65–140)
GLUCOSE SERPL-MCNC: 262 MG/DL (ref 65–140)
GLUCOSE SERPL-MCNC: 268 MG/DL (ref 65–140)
HBA1C MFR BLD: 9.1 %
HDLC SERPL-MCNC: 49 MG/DL
LDLC SERPL CALC-MCNC: 46 MG/DL (ref 0–100)
P AXIS: 66 DEGREES
PR INTERVAL: 50 MS
QRS AXIS: -29 DEGREES
QRSD INTERVAL: 90 MS
QT INTERVAL: 553 MS
QTC INTERVAL: 571 MS
T WAVE AXIS: 18 DEGREES
TRIGL SERPL-MCNC: 200 MG/DL
VENTRICULAR RATE: 64 BPM

## 2020-11-18 PROCEDURE — 97163 PT EVAL HIGH COMPLEX 45 MIN: CPT

## 2020-11-18 PROCEDURE — 82948 REAGENT STRIP/BLOOD GLUCOSE: CPT

## 2020-11-18 PROCEDURE — 93010 ELECTROCARDIOGRAM REPORT: CPT | Performed by: INTERNAL MEDICINE

## 2020-11-18 PROCEDURE — 36415 COLL VENOUS BLD VENIPUNCTURE: CPT | Performed by: PHYSICIAN ASSISTANT

## 2020-11-18 PROCEDURE — 80061 LIPID PANEL: CPT | Performed by: PHYSICIAN ASSISTANT

## 2020-11-18 PROCEDURE — 83036 HEMOGLOBIN GLYCOSYLATED A1C: CPT | Performed by: PHYSICIAN ASSISTANT

## 2020-11-18 PROCEDURE — G1004 CDSM NDSC: HCPCS

## 2020-11-18 PROCEDURE — 99232 SBSQ HOSP IP/OBS MODERATE 35: CPT | Performed by: PSYCHIATRY & NEUROLOGY

## 2020-11-18 PROCEDURE — 97116 GAIT TRAINING THERAPY: CPT

## 2020-11-18 PROCEDURE — 70551 MRI BRAIN STEM W/O DYE: CPT

## 2020-11-18 PROCEDURE — 99233 SBSQ HOSP IP/OBS HIGH 50: CPT | Performed by: INTERNAL MEDICINE

## 2020-11-18 PROCEDURE — 93306 TTE W/DOPPLER COMPLETE: CPT

## 2020-11-18 PROCEDURE — 93306 TTE W/DOPPLER COMPLETE: CPT | Performed by: INTERNAL MEDICINE

## 2020-11-18 PROCEDURE — 92610 EVALUATE SWALLOWING FUNCTION: CPT

## 2020-11-18 RX ADMIN — ASPIRIN 81 MG CHEWABLE TABLET 81 MG: 81 TABLET CHEWABLE at 10:14

## 2020-11-18 RX ADMIN — CLOPIDOGREL BISULFATE 75 MG: 75 TABLET ORAL at 10:15

## 2020-11-18 RX ADMIN — ERGOCALCIFEROL 50000 UNITS: 1.25 CAPSULE ORAL at 10:15

## 2020-11-18 RX ADMIN — INSULIN LISPRO 1 UNITS: 100 INJECTION, SOLUTION INTRAVENOUS; SUBCUTANEOUS at 18:56

## 2020-11-18 RX ADMIN — PANTOPRAZOLE SODIUM 20 MG: 20 TABLET, DELAYED RELEASE ORAL at 05:56

## 2020-11-18 RX ADMIN — LEVOTHYROXINE SODIUM 25 MCG: 25 TABLET ORAL at 05:56

## 2020-11-18 RX ADMIN — ATORVASTATIN CALCIUM 40 MG: 40 TABLET, FILM COATED ORAL at 17:31

## 2020-11-18 RX ADMIN — ENOXAPARIN SODIUM 40 MG: 40 INJECTION SUBCUTANEOUS at 10:14

## 2020-11-19 ENCOUNTER — APPOINTMENT (INPATIENT)
Dept: CT IMAGING | Facility: HOSPITAL | Age: 85
DRG: 065 | End: 2020-11-19
Payer: MEDICARE

## 2020-11-19 PROBLEM — I48.0 PAROXYSMAL ATRIAL FIBRILLATION (HCC): Status: ACTIVE | Noted: 2020-11-19

## 2020-11-19 LAB
ANION GAP SERPL CALCULATED.3IONS-SCNC: 11 MMOL/L (ref 4–13)
BUN SERPL-MCNC: 15 MG/DL (ref 5–25)
CALCIUM SERPL-MCNC: 9.7 MG/DL (ref 8.3–10.1)
CHLORIDE SERPL-SCNC: 104 MMOL/L (ref 100–108)
CO2 SERPL-SCNC: 23 MMOL/L (ref 21–32)
CREAT SERPL-MCNC: 0.86 MG/DL (ref 0.6–1.3)
ERYTHROCYTE [DISTWIDTH] IN BLOOD BY AUTOMATED COUNT: 12.2 % (ref 11.6–15.1)
GFR SERPL CREATININE-BSD FRML MDRD: 58 ML/MIN/1.73SQ M
GLUCOSE SERPL-MCNC: 169 MG/DL (ref 65–140)
GLUCOSE SERPL-MCNC: 177 MG/DL (ref 65–140)
GLUCOSE SERPL-MCNC: 188 MG/DL (ref 65–140)
GLUCOSE SERPL-MCNC: 196 MG/DL (ref 65–140)
GLUCOSE SERPL-MCNC: 250 MG/DL (ref 65–140)
HCT VFR BLD AUTO: 40.9 % (ref 34.8–46.1)
HGB BLD-MCNC: 13.3 G/DL (ref 11.5–15.4)
MCH RBC QN AUTO: 30.3 PG (ref 26.8–34.3)
MCHC RBC AUTO-ENTMCNC: 32.5 G/DL (ref 31.4–37.4)
MCV RBC AUTO: 93 FL (ref 82–98)
PLATELET # BLD AUTO: 206 THOUSANDS/UL (ref 149–390)
PMV BLD AUTO: 9.3 FL (ref 8.9–12.7)
POTASSIUM SERPL-SCNC: 3.8 MMOL/L (ref 3.5–5.3)
RBC # BLD AUTO: 4.39 MILLION/UL (ref 3.81–5.12)
SODIUM SERPL-SCNC: 138 MMOL/L (ref 136–145)
WBC # BLD AUTO: 6.8 THOUSAND/UL (ref 4.31–10.16)

## 2020-11-19 PROCEDURE — 82948 REAGENT STRIP/BLOOD GLUCOSE: CPT

## 2020-11-19 PROCEDURE — G1004 CDSM NDSC: HCPCS

## 2020-11-19 PROCEDURE — 99233 SBSQ HOSP IP/OBS HIGH 50: CPT | Performed by: INTERNAL MEDICINE

## 2020-11-19 PROCEDURE — 97116 GAIT TRAINING THERAPY: CPT

## 2020-11-19 PROCEDURE — 70498 CT ANGIOGRAPHY NECK: CPT

## 2020-11-19 PROCEDURE — 99222 1ST HOSP IP/OBS MODERATE 55: CPT | Performed by: INTERNAL MEDICINE

## 2020-11-19 PROCEDURE — 99232 SBSQ HOSP IP/OBS MODERATE 35: CPT | Performed by: PSYCHIATRY & NEUROLOGY

## 2020-11-19 PROCEDURE — 85027 COMPLETE CBC AUTOMATED: CPT | Performed by: INTERNAL MEDICINE

## 2020-11-19 PROCEDURE — 70496 CT ANGIOGRAPHY HEAD: CPT

## 2020-11-19 PROCEDURE — 80048 BASIC METABOLIC PNL TOTAL CA: CPT | Performed by: INTERNAL MEDICINE

## 2020-11-19 PROCEDURE — 97110 THERAPEUTIC EXERCISES: CPT

## 2020-11-19 RX ADMIN — ASPIRIN 81 MG CHEWABLE TABLET 81 MG: 81 TABLET CHEWABLE at 08:28

## 2020-11-19 RX ADMIN — IOHEXOL 85 ML: 350 INJECTION, SOLUTION INTRAVENOUS at 10:46

## 2020-11-19 RX ADMIN — ATORVASTATIN CALCIUM 40 MG: 40 TABLET, FILM COATED ORAL at 17:56

## 2020-11-19 RX ADMIN — CLOPIDOGREL BISULFATE 75 MG: 75 TABLET ORAL at 08:28

## 2020-11-19 RX ADMIN — INSULIN LISPRO 1 UNITS: 100 INJECTION, SOLUTION INTRAVENOUS; SUBCUTANEOUS at 17:57

## 2020-11-19 RX ADMIN — AMLODIPINE BESYLATE 10 MG: 10 TABLET ORAL at 08:28

## 2020-11-19 RX ADMIN — INSULIN LISPRO 1 UNITS: 100 INJECTION, SOLUTION INTRAVENOUS; SUBCUTANEOUS at 14:44

## 2020-11-19 RX ADMIN — LEVOTHYROXINE SODIUM 25 MCG: 25 TABLET ORAL at 06:53

## 2020-11-19 RX ADMIN — PANTOPRAZOLE SODIUM 20 MG: 20 TABLET, DELAYED RELEASE ORAL at 06:53

## 2020-11-19 RX ADMIN — ENOXAPARIN SODIUM 40 MG: 40 INJECTION SUBCUTANEOUS at 08:27

## 2020-11-19 RX ADMIN — INSULIN LISPRO 1 UNITS: 100 INJECTION, SOLUTION INTRAVENOUS; SUBCUTANEOUS at 08:26

## 2020-11-20 VITALS
SYSTOLIC BLOOD PRESSURE: 151 MMHG | RESPIRATION RATE: 18 BRPM | OXYGEN SATURATION: 94 % | BODY MASS INDEX: 30.64 KG/M2 | HEART RATE: 66 BPM | TEMPERATURE: 97.7 F | HEIGHT: 58 IN | DIASTOLIC BLOOD PRESSURE: 66 MMHG

## 2020-11-20 LAB
ANION GAP SERPL CALCULATED.3IONS-SCNC: 10 MMOL/L (ref 4–13)
BUN SERPL-MCNC: 15 MG/DL (ref 5–25)
CALCIUM SERPL-MCNC: 9.3 MG/DL (ref 8.3–10.1)
CHLORIDE SERPL-SCNC: 104 MMOL/L (ref 100–108)
CO2 SERPL-SCNC: 25 MMOL/L (ref 21–32)
CREAT SERPL-MCNC: 0.89 MG/DL (ref 0.6–1.3)
ERYTHROCYTE [DISTWIDTH] IN BLOOD BY AUTOMATED COUNT: 12.3 % (ref 11.6–15.1)
FLUAV RNA RESP QL NAA+PROBE: NEGATIVE
FLUBV RNA RESP QL NAA+PROBE: NEGATIVE
GFR SERPL CREATININE-BSD FRML MDRD: 56 ML/MIN/1.73SQ M
GLUCOSE SERPL-MCNC: 237 MG/DL (ref 65–140)
GLUCOSE SERPL-MCNC: 239 MG/DL (ref 65–140)
GLUCOSE SERPL-MCNC: 240 MG/DL (ref 65–140)
GLUCOSE SERPL-MCNC: 260 MG/DL (ref 65–140)
HCT VFR BLD AUTO: 41.3 % (ref 34.8–46.1)
HGB BLD-MCNC: 13.6 G/DL (ref 11.5–15.4)
MCH RBC QN AUTO: 30.6 PG (ref 26.8–34.3)
MCHC RBC AUTO-ENTMCNC: 32.9 G/DL (ref 31.4–37.4)
MCV RBC AUTO: 93 FL (ref 82–98)
PLATELET # BLD AUTO: 197 THOUSANDS/UL (ref 149–390)
PMV BLD AUTO: 9.1 FL (ref 8.9–12.7)
POTASSIUM SERPL-SCNC: 3.8 MMOL/L (ref 3.5–5.3)
RBC # BLD AUTO: 4.45 MILLION/UL (ref 3.81–5.12)
RSV RNA RESP QL NAA+PROBE: NEGATIVE
SARS-COV-2 RNA RESP QL NAA+PROBE: NEGATIVE
SODIUM SERPL-SCNC: 139 MMOL/L (ref 136–145)
WBC # BLD AUTO: 6.47 THOUSAND/UL (ref 4.31–10.16)

## 2020-11-20 PROCEDURE — 97116 GAIT TRAINING THERAPY: CPT

## 2020-11-20 PROCEDURE — 99239 HOSP IP/OBS DSCHRG MGMT >30: CPT | Performed by: INTERNAL MEDICINE

## 2020-11-20 PROCEDURE — 97167 OT EVAL HIGH COMPLEX 60 MIN: CPT

## 2020-11-20 PROCEDURE — 82948 REAGENT STRIP/BLOOD GLUCOSE: CPT

## 2020-11-20 PROCEDURE — 85027 COMPLETE CBC AUTOMATED: CPT | Performed by: INTERNAL MEDICINE

## 2020-11-20 PROCEDURE — 80048 BASIC METABOLIC PNL TOTAL CA: CPT | Performed by: INTERNAL MEDICINE

## 2020-11-20 PROCEDURE — 99232 SBSQ HOSP IP/OBS MODERATE 35: CPT | Performed by: INTERNAL MEDICINE

## 2020-11-20 PROCEDURE — 0241U HB NFCT DS VIR RESP RNA 4 TRGT: CPT | Performed by: INTERNAL MEDICINE

## 2020-11-20 RX ORDER — INSULIN GLARGINE 100 [IU]/ML
5 INJECTION, SOLUTION SUBCUTANEOUS
Status: DISCONTINUED | OUTPATIENT
Start: 2020-11-20 | End: 2020-11-20 | Stop reason: HOSPADM

## 2020-11-20 RX ORDER — ASPIRIN 81 MG/1
81 TABLET, CHEWABLE ORAL DAILY
Refills: 0
Start: 2020-11-21 | End: 2021-01-06 | Stop reason: SDUPTHER

## 2020-11-20 RX ORDER — ATORVASTATIN CALCIUM 40 MG/1
40 TABLET, FILM COATED ORAL EVERY EVENING
Refills: 0
Start: 2020-11-20 | End: 2021-01-06 | Stop reason: SDUPTHER

## 2020-11-20 RX ADMIN — LEVOTHYROXINE SODIUM 25 MCG: 25 TABLET ORAL at 05:40

## 2020-11-20 RX ADMIN — ENOXAPARIN SODIUM 40 MG: 40 INJECTION SUBCUTANEOUS at 08:08

## 2020-11-20 RX ADMIN — ASPIRIN 81 MG CHEWABLE TABLET 81 MG: 81 TABLET CHEWABLE at 08:07

## 2020-11-20 RX ADMIN — AMLODIPINE BESYLATE 10 MG: 10 TABLET ORAL at 08:07

## 2020-11-20 RX ADMIN — PANTOPRAZOLE SODIUM 20 MG: 20 TABLET, DELAYED RELEASE ORAL at 05:40

## 2020-11-20 RX ADMIN — INSULIN LISPRO 2 UNITS: 100 INJECTION, SOLUTION INTRAVENOUS; SUBCUTANEOUS at 08:06

## 2020-11-20 RX ADMIN — CLOPIDOGREL BISULFATE 75 MG: 75 TABLET ORAL at 08:07

## 2020-11-20 RX ADMIN — APIXABAN 5 MG: 5 TABLET, FILM COATED ORAL at 09:36

## 2020-11-23 ENCOUNTER — TRANSITIONAL CARE MANAGEMENT (OUTPATIENT)
Dept: FAMILY MEDICINE CLINIC | Facility: MEDICAL CENTER | Age: 85
End: 2020-11-23

## 2020-11-23 ENCOUNTER — TRANSITIONAL CARE MANAGEMENT (OUTPATIENT)
Dept: FAMILY MEDICINE CLINIC | Facility: CLINIC | Age: 85
End: 2020-11-23

## 2020-11-23 ENCOUNTER — PATIENT OUTREACH (OUTPATIENT)
Dept: CASE MANAGEMENT | Facility: OTHER | Age: 85
End: 2020-11-23

## 2020-11-23 LAB
BACTERIA BLD CULT: NORMAL
BACTERIA BLD CULT: NORMAL

## 2020-11-24 ENCOUNTER — TELEPHONE (OUTPATIENT)
Dept: NEUROLOGY | Facility: CLINIC | Age: 85
End: 2020-11-24

## 2020-11-25 ENCOUNTER — PATIENT OUTREACH (OUTPATIENT)
Dept: CASE MANAGEMENT | Facility: OTHER | Age: 85
End: 2020-11-25

## 2020-12-02 ENCOUNTER — TELEPHONE (OUTPATIENT)
Dept: NEUROLOGY | Facility: CLINIC | Age: 85
End: 2020-12-02

## 2020-12-02 ENCOUNTER — PATIENT OUTREACH (OUTPATIENT)
Dept: CASE MANAGEMENT | Facility: OTHER | Age: 85
End: 2020-12-02

## 2020-12-09 ENCOUNTER — PATIENT OUTREACH (OUTPATIENT)
Dept: CASE MANAGEMENT | Facility: OTHER | Age: 85
End: 2020-12-09

## 2020-12-10 ENCOUNTER — TELEMEDICINE (OUTPATIENT)
Dept: NEUROLOGY | Facility: CLINIC | Age: 85
End: 2020-12-10
Payer: MEDICARE

## 2020-12-10 ENCOUNTER — TELEPHONE (OUTPATIENT)
Dept: NEUROLOGY | Facility: CLINIC | Age: 85
End: 2020-12-10

## 2020-12-10 VITALS — WEIGHT: 155 LBS | HEIGHT: 58 IN | BODY MASS INDEX: 32.54 KG/M2

## 2020-12-10 DIAGNOSIS — Z86.73 HISTORY OF STROKE: Primary | ICD-10-CM

## 2020-12-10 DIAGNOSIS — R90.89 ABNORMAL BRAIN MRI: ICD-10-CM

## 2020-12-10 PROCEDURE — G2012 BRIEF CHECK IN BY MD/QHP: HCPCS | Performed by: PHYSICIAN ASSISTANT

## 2020-12-14 ENCOUNTER — TELEPHONE (OUTPATIENT)
Dept: CARDIOLOGY CLINIC | Facility: CLINIC | Age: 85
End: 2020-12-14

## 2020-12-16 ENCOUNTER — PATIENT OUTREACH (OUTPATIENT)
Dept: CASE MANAGEMENT | Facility: OTHER | Age: 85
End: 2020-12-16

## 2020-12-17 ENCOUNTER — PATIENT OUTREACH (OUTPATIENT)
Dept: CASE MANAGEMENT | Facility: HOSPITAL | Age: 85
End: 2020-12-17

## 2020-12-17 DIAGNOSIS — E11.69 TYPE 2 DIABETES MELLITUS WITH OTHER SPECIFIED COMPLICATION, WITHOUT LONG-TERM CURRENT USE OF INSULIN (HCC): Primary | ICD-10-CM

## 2020-12-17 DIAGNOSIS — I63.9 CEREBROVASCULAR ACCIDENT (CVA), UNSPECIFIED MECHANISM (HCC): Chronic | ICD-10-CM

## 2020-12-17 DIAGNOSIS — I10 BENIGN ESSENTIAL HYPERTENSION: ICD-10-CM

## 2020-12-17 DIAGNOSIS — I48.0 PAROXYSMAL ATRIAL FIBRILLATION (HCC): ICD-10-CM

## 2020-12-22 ENCOUNTER — PATIENT OUTREACH (OUTPATIENT)
Dept: CASE MANAGEMENT | Facility: OTHER | Age: 85
End: 2020-12-22

## 2020-12-22 DIAGNOSIS — Z09 FOLLOW UP: Primary | ICD-10-CM

## 2020-12-23 ENCOUNTER — PATIENT OUTREACH (OUTPATIENT)
Dept: CASE MANAGEMENT | Facility: HOSPITAL | Age: 85
End: 2020-12-23

## 2020-12-23 ENCOUNTER — OFFICE VISIT (OUTPATIENT)
Dept: FAMILY MEDICINE CLINIC | Facility: CLINIC | Age: 85
End: 2020-12-23
Payer: MEDICARE

## 2020-12-23 VITALS
TEMPERATURE: 97.5 F | WEIGHT: 149 LBS | RESPIRATION RATE: 18 BRPM | HEIGHT: 58 IN | SYSTOLIC BLOOD PRESSURE: 122 MMHG | HEART RATE: 65 BPM | DIASTOLIC BLOOD PRESSURE: 62 MMHG | BODY MASS INDEX: 31.28 KG/M2 | OXYGEN SATURATION: 96 %

## 2020-12-23 DIAGNOSIS — Z13.89 SCREENING FOR HEMATURIA OR PROTEINURIA: ICD-10-CM

## 2020-12-23 DIAGNOSIS — Z13.0 SCREENING FOR DEFICIENCY ANEMIA: ICD-10-CM

## 2020-12-23 DIAGNOSIS — E78.2 ELEVATED CHOLESTEROL WITH ELEVATED TRIGLYCERIDES: ICD-10-CM

## 2020-12-23 DIAGNOSIS — E11.9 TYPE 2 DIABETES MELLITUS WITHOUT COMPLICATION, WITHOUT LONG-TERM CURRENT USE OF INSULIN (HCC): Primary | ICD-10-CM

## 2020-12-23 DIAGNOSIS — I10 ESSENTIAL HYPERTENSION: ICD-10-CM

## 2020-12-23 DIAGNOSIS — Z76.89 ENCOUNTER FOR SUPPORT AND COORDINATION OF TRANSITION OF CARE: ICD-10-CM

## 2020-12-23 PROCEDURE — 99213 OFFICE O/P EST LOW 20 MIN: CPT | Performed by: NURSE PRACTITIONER

## 2020-12-23 RX ORDER — OLOPATADINE HYDROCHLORIDE 2 MG/ML
SOLUTION/ DROPS OPHTHALMIC
COMMUNITY
Start: 2020-12-08 | End: 2021-06-30 | Stop reason: HOSPADM

## 2020-12-23 RX ORDER — A/SINGAPORE/GP1908/2015 IVR-180 (AN A/MICHIGAN/45/2015 (H1N1)PDM09-LIKE VIRUS, A/HONG KONG/4801/2014, NYMC X-263B (H3N2) (AN A/HONG KONG/4801/2014-LIKE VIRUS), AND B/BRISBANE/60/2008, WILD TYPE (A B/BRISBANE/60/2008-LIKE VIRUS) 15; 15; 15 UG/.5ML; UG/.5ML; UG/.5ML
INJECTION, SUSPENSION INTRAMUSCULAR
COMMUNITY
Start: 2020-10-01 | End: 2021-06-30 | Stop reason: HOSPADM

## 2020-12-23 RX ORDER — NITROFURANTOIN 25; 75 MG/1; MG/1
100 CAPSULE ORAL 2 TIMES DAILY
COMMUNITY
Start: 2020-12-08 | End: 2021-06-30 | Stop reason: HOSPADM

## 2020-12-30 ENCOUNTER — PATIENT OUTREACH (OUTPATIENT)
Dept: CASE MANAGEMENT | Facility: HOSPITAL | Age: 85
End: 2020-12-30

## 2020-12-30 ENCOUNTER — APPOINTMENT (OUTPATIENT)
Dept: LAB | Facility: CLINIC | Age: 85
End: 2020-12-30
Payer: MEDICARE

## 2020-12-30 ENCOUNTER — OFFICE VISIT (OUTPATIENT)
Dept: CARDIOLOGY CLINIC | Facility: CLINIC | Age: 85
End: 2020-12-30
Payer: MEDICARE

## 2020-12-30 VITALS
WEIGHT: 145 LBS | OXYGEN SATURATION: 95 % | DIASTOLIC BLOOD PRESSURE: 70 MMHG | SYSTOLIC BLOOD PRESSURE: 134 MMHG | HEART RATE: 68 BPM | BODY MASS INDEX: 30.44 KG/M2 | HEIGHT: 58 IN

## 2020-12-30 DIAGNOSIS — I63.9 CEREBROVASCULAR ACCIDENT (CVA), UNSPECIFIED MECHANISM (HCC): Chronic | ICD-10-CM

## 2020-12-30 DIAGNOSIS — Z79.01 CHRONIC ANTICOAGULATION: ICD-10-CM

## 2020-12-30 DIAGNOSIS — I10 ESSENTIAL HYPERTENSION: ICD-10-CM

## 2020-12-30 DIAGNOSIS — I47.1 PAROXYSMAL ATRIAL TACHYCARDIA (HCC): ICD-10-CM

## 2020-12-30 DIAGNOSIS — I63.9 CEREBROVASCULAR ACCIDENT (CVA), UNSPECIFIED MECHANISM (HCC): Primary | Chronic | ICD-10-CM

## 2020-12-30 DIAGNOSIS — E78.2 MIXED HYPERLIPIDEMIA: ICD-10-CM

## 2020-12-30 LAB
ANION GAP SERPL CALCULATED.3IONS-SCNC: 9 MMOL/L (ref 4–13)
BUN SERPL-MCNC: 21 MG/DL (ref 5–25)
CALCIUM SERPL-MCNC: 10.4 MG/DL (ref 8.3–10.1)
CHLORIDE SERPL-SCNC: 101 MMOL/L (ref 100–108)
CO2 SERPL-SCNC: 26 MMOL/L (ref 21–32)
CREAT SERPL-MCNC: 0.84 MG/DL (ref 0.6–1.3)
ERYTHROCYTE [DISTWIDTH] IN BLOOD BY AUTOMATED COUNT: 12.3 % (ref 11.6–15.1)
GFR SERPL CREATININE-BSD FRML MDRD: 60 ML/MIN/1.73SQ M
GLUCOSE SERPL-MCNC: 217 MG/DL (ref 65–140)
HCT VFR BLD AUTO: 46.3 % (ref 34.8–46.1)
HGB BLD-MCNC: 15.4 G/DL (ref 11.5–15.4)
MCH RBC QN AUTO: 30.8 PG (ref 26.8–34.3)
MCHC RBC AUTO-ENTMCNC: 33.3 G/DL (ref 31.4–37.4)
MCV RBC AUTO: 93 FL (ref 82–98)
PLATELET # BLD AUTO: 240 THOUSANDS/UL (ref 149–390)
PMV BLD AUTO: 9.5 FL (ref 8.9–12.7)
POTASSIUM SERPL-SCNC: 4.2 MMOL/L (ref 3.5–5.3)
RBC # BLD AUTO: 5 MILLION/UL (ref 3.81–5.12)
SODIUM SERPL-SCNC: 136 MMOL/L (ref 136–145)
WBC # BLD AUTO: 9.71 THOUSAND/UL (ref 4.31–10.16)

## 2020-12-30 PROCEDURE — 80048 BASIC METABOLIC PNL TOTAL CA: CPT

## 2020-12-30 PROCEDURE — 85027 COMPLETE CBC AUTOMATED: CPT

## 2020-12-30 PROCEDURE — 93000 ELECTROCARDIOGRAM COMPLETE: CPT | Performed by: NURSE PRACTITIONER

## 2020-12-30 PROCEDURE — 99214 OFFICE O/P EST MOD 30 MIN: CPT | Performed by: NURSE PRACTITIONER

## 2020-12-30 PROCEDURE — 36415 COLL VENOUS BLD VENIPUNCTURE: CPT

## 2020-12-31 ENCOUNTER — PATIENT OUTREACH (OUTPATIENT)
Dept: CASE MANAGEMENT | Facility: HOSPITAL | Age: 85
End: 2020-12-31

## 2021-01-04 ENCOUNTER — PATIENT OUTREACH (OUTPATIENT)
Dept: CASE MANAGEMENT | Facility: OTHER | Age: 86
End: 2021-01-04

## 2021-01-04 ENCOUNTER — PATIENT OUTREACH (OUTPATIENT)
Dept: CASE MANAGEMENT | Facility: HOSPITAL | Age: 86
End: 2021-01-04

## 2021-01-04 DIAGNOSIS — I48.0 PAROXYSMAL ATRIAL FIBRILLATION (HCC): ICD-10-CM

## 2021-01-04 DIAGNOSIS — I63.9 CVA (CEREBRAL VASCULAR ACCIDENT) (HCC): Chronic | ICD-10-CM

## 2021-01-04 NOTE — PROGRESS NOTES
1st Attempt:    Community Health Worker attempted to contact patient regarding referral from [address] to assist patient with PACE/PACENET, Meals on [employer], and [employer]  Spoke with patients daughter and introduced myself and explained my role  I arranged to meet with the patients daughter on Friday 01- at 2pm via telephone to complete applications  Patients daughter agreed  Will follow up on Friday 01-  Referred this information back to [employer] [address]

## 2021-01-04 NOTE — TELEPHONE ENCOUNTER
Patient's daughter walked in office for Eliquis samples , we currently do not have any available  I gave her a 30day free card to use at her retail pharmacy until her patient assistance application is accepted  Can you please order a 30 day supply to [employer]   Thank you

## 2021-01-04 NOTE — PROGRESS NOTES
OP GALILEA SW reached out to [employer] to follow up and see when she might be able to reach out to patient/ caregiver  Amarilis Li said that she will be attempting her initial outreach today  Update to Marly Hammond RN CM  Will continue to follow and be available as needed

## 2021-01-05 ENCOUNTER — PATIENT OUTREACH (OUTPATIENT)
Dept: CASE MANAGEMENT | Facility: HOSPITAL | Age: 86
End: 2021-01-05

## 2021-01-06 DIAGNOSIS — I63.9 CVA (CEREBRAL VASCULAR ACCIDENT) (HCC): Chronic | ICD-10-CM

## 2021-01-06 DIAGNOSIS — I48.0 PAROXYSMAL ATRIAL FIBRILLATION (HCC): ICD-10-CM

## 2021-01-06 DIAGNOSIS — E55.9 VITAMIN D DEFICIENCY: ICD-10-CM

## 2021-01-06 NOTE — TELEPHONE ENCOUNTER
Called to clarify medication refill with patients daughter  Patient needs refills on atorvastatin, aspirin 81mg, and vitamin d2  Eliquis was just filled but I noticed she does not have any refills left  I added it as well, if you would like to approve it before it runs out

## 2021-01-06 NOTE — TELEPHONE ENCOUNTER
[facility] called patient needs refill on cholesterol medication  ----[facility]

## 2021-01-07 RX ORDER — ATORVASTATIN CALCIUM 40 MG/1
40 TABLET, FILM COATED ORAL EVERY EVENING
Qty: 90 TABLET | Refills: 1 | Status: ON HOLD
Start: 2021-01-07 | End: 2021-06-29

## 2021-01-07 RX ORDER — ERGOCALCIFEROL 1.25 MG/1
50000 CAPSULE ORAL WEEKLY
Qty: 12 CAPSULE | Refills: 2 | Status: SHIPPED | OUTPATIENT
Start: 2021-01-07 | End: 2021-07-28 | Stop reason: SDUPTHER

## 2021-01-07 RX ORDER — ASPIRIN 81 MG/1
81 TABLET, CHEWABLE ORAL DAILY
Qty: 90 TABLET | Refills: 1 | Status: ON HOLD
Start: 2021-01-07 | End: 2021-06-29

## 2021-01-08 ENCOUNTER — PATIENT OUTREACH (OUTPATIENT)
Dept: CASE MANAGEMENT | Facility: HOSPITAL | Age: 86
End: 2021-01-08

## 2021-01-08 NOTE — PROGRESS NOTES
2nd Attempt:    Community Health Worker attempted to contact patient regarding referral from [address] to assist patient's daughter with applying her mother to [employer]  I had arranged a meeting with the patients daughter for today at 2pm to complete the application  However, the patients daughter stated she does not have the required documents ready and asked if we could reschedule  We rescheduled to Monday 01-08 at 1:30 pm  Information relayed back to [address]      --------------------------------------------------------  3rd Attempt:    CHW attempted to contact patient regarding referral  Call Complete  Spoke with patients daughter and was able to complete application for PACENET  I arranged another meeting on Wednesday 01- at 1:30 to complete the [facility] application  I also provided the patients daughter information about Meals on Wheels  I provided the patients daughter with contact information for them  I referred this information back to [facility]

## 2021-01-11 ENCOUNTER — PATIENT OUTREACH (OUTPATIENT)
Dept: CASE MANAGEMENT | Facility: OTHER | Age: 86
End: 2021-01-11

## 2021-01-11 ENCOUNTER — TELEPHONE (OUTPATIENT)
Dept: FAMILY MEDICINE CLINIC | Facility: CLINIC | Age: 86
End: 2021-01-11

## 2021-01-11 NOTE — TELEPHONE ENCOUNTER
Pharmacy did not receive the following - they were sent to "no print"    Approved Medication Requests       Atorvastatin Calcium 40 mg Oral Every evening     Aspirin 81 mg Oral Daily     Ergocalciferol 50,000 Units Oral Weekly     Apixaban 5 mg Oral 2 times daily      Can you resend please?   [facility]

## 2021-01-11 NOTE — TELEPHONE ENCOUNTER
Can you please call pharmacy verify she did not get these refills if not then please place the refills through phone order thank you

## 2021-01-11 NOTE — PROGRESS NOTES
OP CM SW received update from [facility] eager that she was supposed to meet with patient/ daughter on Friday, however they did not have the paperwork ready and Berry rescheduled to today at 1:30 will continue to follow and be available as needed  Patient is in need of PACE / [address] , food stamps/ MOWs

## 2021-01-13 ENCOUNTER — PATIENT OUTREACH (OUTPATIENT)
Dept: CASE MANAGEMENT | Facility: HOSPITAL | Age: 86
End: 2021-01-13

## 2021-01-13 NOTE — PROGRESS NOTES
3rd Attempt:    CHW attempted to reach daughter Alisia Richards to complete SNAP application for her mother  She was not available and I left a message with my name and number asking for a call back  ---------------------------------------------------------------------------------  Received incoming call from patients daughter  Spoke with patients daughter and was able to complete SNAP application  The application requires a signature which I will send to the patients address  I instructed the patients daughter where she needs to sign and what to do with the application once it is finished  I will follow up with the patients daughter next week for an update  This information has been referred back to [address]  I will be calling patient on Monday 01- to follow up  The documents the patients daughter needs to sign has not arrived yet and should hopefully arrive over the weekend

## 2021-01-14 ENCOUNTER — TELEPHONE (OUTPATIENT)
Dept: FAMILY MEDICINE CLINIC | Facility: CLINIC | Age: 86
End: 2021-01-14

## 2021-01-14 NOTE — TELEPHONE ENCOUNTER
Sabra Tineo from [facility] at home called patient fell 2 days ago  She said her left ribs hurt but when giovanna looked it it was not black and blue and it was not painful to touch  Daughter is aware of the fall  And also she is moving in with her daughter this weekend   ---her daughter will call if something comes up  But they just wanted you aware

## 2021-01-15 NOTE — TELEPHONE ENCOUNTER
Patient probably should make a follow-up for evaluation of her rib and if necessary we can order x-rays

## 2021-01-18 ENCOUNTER — PATIENT OUTREACH (OUTPATIENT)
Dept: CASE MANAGEMENT | Facility: HOSPITAL | Age: 86
End: 2021-01-18

## 2021-01-18 NOTE — PROGRESS NOTES
Chart review completed  [facility] requested and refreshed  Spoke with daughter of patient today who reports that patients planning to move in this weekend  Shawn Brain states it was supposed to be this past weekend but the person who is assisting with the move had a family member test positive with Anat Selby and they did not want to chance exposure  Daughter is working on paperwork for financial applications and will go to [employer] to fax papers  Daughter acknowledges patient's recent fall but admits to being upset that patient did not call daughter after she fell  Daughter encouraged to call doctor if patient continues to have pain, the pain worsens or she becomes SOB  Daughter verbalized same stating she took an ice bag to her earlier this afternoon    This CM will continue to monitor electronically via chart review, outreach and [facility]

## 2021-01-19 ENCOUNTER — PATIENT OUTREACH (OUTPATIENT)
Dept: CASE MANAGEMENT | Facility: OTHER | Age: 86
End: 2021-01-19

## 2021-01-19 NOTE — PROGRESS NOTES
Op GALILEA SOTOMAYOR received update from [address] [place] [address] regarding patient  She had helped patient apply for food stamps  Application was sent to daughter address for signatures  Will sign and send to appropriate human services offices   Will continue to follow and be available to see if patients services were in place

## 2021-01-25 ENCOUNTER — PATIENT OUTREACH (OUTPATIENT)
Dept: CASE MANAGEMENT | Facility: HOSPITAL | Age: 86
End: 2021-01-25

## 2021-01-25 NOTE — PROGRESS NOTES
CHW called patients daughter in regards to status of SNAP application  I had sent documents over to the patients daughter, Alvin Diez, last Friday and was checking in to see if they had arrived  Alvin Diez was not home at the time and told me she will call me back when she has the documents  CHW called patients daughter in regards to status of SNAP application  No answer, left message requesting a call back  Will call again tomorrow  Received incoming call from patient, left a voicemail  Returning call, call complete  Alvin Diez did receive the [facility] documents and I will follow up in 2 weeks to ensure the documents were sent out and completed  Patient's daughter has submitted all applications and form  At this time, she has no additional questions  I will be closing this case at this time

## 2021-02-01 ENCOUNTER — PATIENT OUTREACH (OUTPATIENT)
Dept: CASE MANAGEMENT | Facility: OTHER | Age: 86
End: 2021-02-01

## 2021-02-01 NOTE — PROGRESS NOTES
OP CM SW received incoming message from [address] [place] [address] regarding patient  Patient received the food stamp application that was sent to her   Will finalize everything with her daughter, and then send to the human services office  Nasir Doyle is aware of where to send the application  Kaley sierra will follow up in two weeks and then close patient  Appreciative for all of her assistance  Will continue to follow and be available as needed

## 2021-02-02 ENCOUNTER — PATIENT OUTREACH (OUTPATIENT)
Dept: CASE MANAGEMENT | Facility: OTHER | Age: 86
End: 2021-02-02

## 2021-02-10 ENCOUNTER — PATIENT OUTREACH (OUTPATIENT)
Dept: CASE MANAGEMENT | Facility: HOSPITAL | Age: 86
End: 2021-02-10

## 2021-02-10 NOTE — PROGRESS NOTES
Chart review completed  Outside records through [place] requested and refreshed  OP CHW assisting daughter with food stamp application  Unsuccessful attempt at reaching patient's daughter Anamaria Garcia  Left name, call back number and message explaining that medicare bundle program will soon close on 2/17 and the outreaches of this CM will cease beyond closure date, unless clinical needs are identified  CM requested return call  This CM will continue to monitor electronically via chart review, outreach and [facility]  Return call from patients daughter Anamaria Garcia who states patient has officially moved into the lower level of her home  States she has a little mini apartment with a fridge, coffee pot, and heating pad to warm up food  Washer and dryer are in the room next door for which patient does not have to go outside or utilized steps to get to  She states that patient is without SOB or edema  She reports that she returned the snap application just about one week ago and patient is not eligible for MOW as she is now living with daughter and her   Daughter did provide new address of [address] [place] [address] [address] [id number]  Anamaria Islasar states to present, she has not received the POLST form and this CM will resend two new address  Daughter is requesting prescriptions assistance for Eliquis an Januvia  She reports the eliquis is approximately $157 for a 30 day supply and that is with her insurance  She believes the Januvia is about $70 00 a month  CM will route call to CHW regarding above requesting further assistance for prescription needs  Anamaria Garcia did state best time to reach her is Brunilda Shear and Wed after 1pm or any time Thursday  Call routed to CHW and SW

## 2021-02-15 NOTE — PROGRESS NOTES
Alec Portillo / Brenden Lange is assistance with Prescription it is the most effective due to her having Medicare   She will not qualify through [employer] due to having medicare  We are waiting for an answer back from pace

## 2021-02-16 ENCOUNTER — PATIENT OUTREACH (OUTPATIENT)
Dept: CASE MANAGEMENT | Facility: HOSPITAL | Age: 86
End: 2021-02-16

## 2021-02-18 ENCOUNTER — PATIENT OUTREACH (OUTPATIENT)
Dept: CASE MANAGEMENT | Facility: OTHER | Age: 86
End: 2021-02-18

## 2021-02-18 ENCOUNTER — EPISODE CHANGES (OUTPATIENT)
Dept: CASE MANAGEMENT | Facility: HOSPITAL | Age: 86
End: 2021-02-18

## 2021-02-18 ENCOUNTER — PATIENT OUTREACH (OUTPATIENT)
Dept: CASE MANAGEMENT | Facility: HOSPITAL | Age: 86
End: 2021-02-18

## 2021-02-18 NOTE — PROGRESS NOTES
Chart review completed  Outside records through [facility] requested and refreshed  Medicare Bundle episode ended  CM spoke with patients daughter to inform her today is the final day of the Medicare bundle program  CM encouraged daughter to call beyond today as needed should complex or social issues arise  Daughter verbalized agreement  Daughter reports that she is doing all that she can for patient and plans to continue providing assistance to patient as long as she can  BPCI form updated, care coordination note removed and bundle episode resolved  CM removed self from care team and sent Inbasket message to Transitional Care Specialist regarding bundle closure

## 2021-02-18 NOTE — PROGRESS NOTES
OP CM SW received update from Yosef Rivera RN CM regarding patient  Patient's bundle has ended and she would be closing patient  SW CM to close patient as well no other needs present  Phil Quijano had assisted patient with SNAP , no return call from patient, regarding application at this time

## 2021-02-24 ENCOUNTER — OFFICE VISIT (OUTPATIENT)
Dept: CARDIOLOGY CLINIC | Facility: CLINIC | Age: 86
End: 2021-02-24
Payer: MEDICARE

## 2021-02-24 VITALS
WEIGHT: 144.8 LBS | HEIGHT: 58 IN | BODY MASS INDEX: 30.39 KG/M2 | DIASTOLIC BLOOD PRESSURE: 80 MMHG | HEART RATE: 64 BPM | SYSTOLIC BLOOD PRESSURE: 130 MMHG

## 2021-02-24 DIAGNOSIS — I10 ESSENTIAL HYPERTENSION: ICD-10-CM

## 2021-02-24 DIAGNOSIS — E78.2 MIXED HYPERLIPIDEMIA: ICD-10-CM

## 2021-02-24 DIAGNOSIS — I63.10 CEREBROVASCULAR ACCIDENT (CVA) DUE TO EMBOLISM OF PRECEREBRAL ARTERY (HCC): Chronic | ICD-10-CM

## 2021-02-24 DIAGNOSIS — I48.0 PAROXYSMAL ATRIAL FIBRILLATION (HCC): Primary | ICD-10-CM

## 2021-02-24 PROCEDURE — 99214 OFFICE O/P EST MOD 30 MIN: CPT | Performed by: INTERNAL MEDICINE

## 2021-02-24 PROCEDURE — 93000 ELECTROCARDIOGRAM COMPLETE: CPT | Performed by: INTERNAL MEDICINE

## 2021-02-24 NOTE — PATIENT INSTRUCTIONS
A-fib (Atrial Fibrillation)   AMBULATORY CARE:   Atrial fibrillation (a-fib)  is an irregular heartbeat  It reduces your heart's ability to pump blood through your body  A-fib may come and go, or it may be a long-term condition  A-fib can cause life-threatening blood clots, stroke, or heart failure  It is important to treat and manage a-fib to help prevent these problems  Common signs and symptoms include the following:   · A heartbeat that races, pounds, or flutters    · Weakness, severe tiredness, or confusion    · Feeling lightheaded, sweaty, dizzy, or faint    · Shortness of breath or anxiety    · Chest pain or pressure    Call your local emergency number (911 in the [address] [facility]) if:   · You have any of the following signs of a heart attack:      ? Squeezing, pressure, or pain in your chest    ? You may  also have any of the following:     ? Discomfort or pain in your back, neck, jaw, stomach, or arm    ? Shortness of breath    ? Nausea or vomiting    ? Lightheadedness or a sudden cold sweat    · You have any of the following signs of a stroke:      ? Numbness or drooping on one side of your face     ? Weakness in an arm or leg    ? Confusion or difficulty speaking    ? Dizziness, a severe headache, or vision loss    Call your cardiologist if:   · Your arm or leg feels warm, tender, and painful  It may look swollen and red  · Your heart rate is more than 110 beats per minute  · You have new or worsening swelling in your legs, feet, ankles, or abdomen  · You are short of breath, even at rest      · You have questions or concerns about your condition or care  Treatment for A-fib:  Conditions that cause a-fib, such as thyroid disease, will be treated  You may also need any of the following:  · Heart medicines  help control your heart rate or rhythm  You may need more than one medicine to treat your symptoms  · Antiplatelet or blood thinner medicines  help prevent blood clots and stroke  · Cardioversion  is a procedure to return your heart rate and rhythm to normal  It can be done using medicines or electric shock  · A-fib ablation  is a procedure that uses energy to burn a small area of heart tissue  This creates scar tissue and prevents electrical signals that cause a-fib  You may need this procedure more than once  Ask for more information on a-fib ablation  · A pacemaker  may be inserted into your heart  A pacemaker is a device that controls your heartbeat  A pacemaker may be inserted during an ablation procedure or surgery  Ask your healthcare provider for more information on pacemakers  · Surgery  may be needed if other procedures do not work  During surgery your healthcare provider will make cuts in the upper part of your heart  The provider will stitch the cuts together to create scar tissue  The scar tissue will prevent electrical signals that cause a-fib  Manage A-fib:   · Know your target heart rate  Learn how to check your pulse and monitor your heart rate  · Know the risks if you choose to drink alcohol  Alcohol can make a-fib hard to manage  Ask your healthcare provider if it is safe for you to drink alcohol  A drink of alcohol is 12 ounces of beer, 5 ounces of wine, or 1½ ounces of liquor  · Do not smoke  Nicotine can cause heart damage and make it more difficult to manage your a-fib  Do not use e-cigarettes or smokeless tobacco in place of cigarettes or to help you quit  They still contain nicotine  Ask your healthcare provider for information if you currently smoke and need help quitting  · Eat heart-healthy foods  Heart healthy foods will help keep your cholesterol low  These include fruits, vegetables, whole-grain breads, low-fat dairy products, beans, lean meats, and fish  Replace butter and margarine with heart-healthy oils such as olive oil and canola oil  · Maintain a healthy weight  Ask your healthcare provider how much you should weigh  Ask him or her to help you create a safe weight loss plan if you are overweight  Even a small goal of a 10% weight loss can improve your heart health  · Get regular physical activity  Physical activity helps improve your heart health  Get at least 150 minutes of moderate aerobic physical activity each week  Your healthcare provider can help you create an activity plan  · Manage other health conditions  This includes high blood pressure or cholesterol, sleep apnea, diabetes, and other heart conditions  Take medicine as directed and follow your treatment plan  Follow up with your cardiologist as directed: You will need regular blood tests and monitoring  Write down your questions so you remember to ask them during your visits  © Copyright [address] Information is for End User's use only and may not be sold, redistributed or otherwise used for commercial purposes  All illustrations and images included in CareNotes® are the copyrighted property of [employer]  or [facility] Tio Foster   The above information is an  only  It is not intended as medical advice for individual conditions or treatments  Talk to your doctor, nurse or pharmacist before following any medical regimen to see if it is safe and effective for you

## 2021-02-24 NOTE — PROGRESS NOTES
Cardiology Follow Up    Taco Wills  4/7/1927  [id number]  [address] [place] [address] Dr Ladarius Rocha [facility]  [facility] [address]  [phone]    1  Paroxysmal atrial fibrillation (HCC)  POCT ECG   2  Essential hypertension     3  Cerebrovascular accident (CVA) due to embolism of precerebral artery ([address] )     4  Mixed hyperlipidemia       Chief Complaint   Patient presents with    Follow-up     headaches    Dizziness       Interval History: Patient feels well, without complaints  No reported chest pain, shortness of breath, palpitations, lightheadedness, syncope, LE edema, orthopnea, PND, or significant weight changes  Patient remains active without any increased fatigue out of the ordinary          Patient Active Problem List   Diagnosis    Essential hypertension    Mixed hyperlipidemia    Type II or unspecified type diabetes mellitus without mention of complication, not stated as uncontrolled    Allergic conjunctivitis of both eyes    Allergic rhinitis    Vasomotor rhinitis    Nonintractable headache    Laryngopharyngeal reflux (LPR)    Snoring    Intrinsic atopic dermatitis    UTI (urinary tract infection)    History of recurrent UTI (urinary tract infection)    Diabetes mellitus ([address] )    BMI 29 0-29 9,adult    Type 2 diabetes mellitus without complications (HCC)    Gastroesophageal reflux disease    Other specified hypothyroidism    Allergic conjunctivitis of left eye    Vitamin D deficiency    CVA (cerebral vascular accident) ([address] )    Paroxysmal atrial fibrillation ([address] )    Abnormal brain MRI    Screening for deficiency anemia    Elevated cholesterol with elevated triglycerides     Past Medical History:   Diagnosis Date    Arthritis     Diabetes (HCC)     Type 2    Glaucoma     Headache     Heartburn     Hypercholesteremia     Hypertension     Hypothyroidism     Migraines     Night sweats     Weight gain      Social History     Socioeconomic History    Marital status:      Spouse name: Won Watters Number of children: 11    Years of education: Not on file    Highest education level: Not on file   Occupational History    Occupation:       Comment: Retired    Social Needs    Financial resource strain: Somewhat hard   [place] insecurity     Worry: Sometimes true     Inability: Sometimes true    Transportation needs     Medical: No     Non-medical: No   Tobacco Use    Smoking status: Never Smoker    Smokeless tobacco: Never Used   Substance and Sexual Activity    Alcohol use: Not Currently    Drug use: Never    Sexual activity: Not Currently   Lifestyle    Physical activity     Days per week: 7 days     Minutes per session: 20 min    Stress: To some extent   Relationships    Social connections     Talks on phone: More than three times a week     Gets together: Never     Attends [employer] service: Never     Active member of club or organization: No     Attends meetings of clubs or organizations: Never     Relationship status:      Intimate partner violence     Fear of current or ex partner: No     Emotionally abused: No     Physically abused: No     Forced sexual activity: No   Other Topics Concern    Not on file   Social History Narrative    Patient lives in an apartment     No basement     No dehumidifier     No air      No humidifier     Hardwood cherry in the bedroom     No air conditioning     Home is smoke free         No pets         Caffeine: 1 cup of coffee daily                     Hot tea occasionally     Chocolate consumed occasionally         Patient lives a lone       Family History   Problem Relation Age of Onset    Other Mother         natural causes     Rheumatic fever Father     No Known Problems Sister     Other Brother         natural causes     No Known Problems Sister     Other Brother         natural causes     Diabetes Daughter  Allergies Daughter     No Known Problems Daughter     No Known Problems Daughter     No Known Problems Daughter     No Known Problems Son      Past Surgical History:   Procedure Laterality Date    CHOLECYSTECTOMY      TUBAL LIGATION      TUMOR REMOVAL Left     WISDOM TOOTH EXTRACTION         Current Outpatient Medications:     amLODIPine (NORVASC) 10 mg tablet, Take 1 tablet (10 mg total) by mouth daily, Disp: 90 tablet, Rfl: 3    apixaban (ELIQUIS) 5 mg, Take 1 tablet (5 mg total) by mouth 2 (two) times a day, Disp: 60 tablet, Rfl: 0    aspirin 81 mg chewable tablet, Chew 1 tablet (81 mg total) daily, Disp: 90 tablet, Rfl: 1    atorvastatin (LIPITOR) 40 mg tablet, Take 1 tablet (40 mg total) by mouth every evening, Disp: 90 tablet, Rfl: 1    ergocalciferol (VITAMIN D2) 50,000 units, Take 1 capsule (50,000 Units total) by mouth once a week, Disp: 12 capsule, Rfl: 2    Fluad Quadrivalent 0 5 ML PRSY, PHARMACY ADMINISTERED, Disp: , Rfl:     FREESTYLE LITE test strip, 100 each by Other route 3 (three) times a day Test, Disp: 100 each, Rfl: 11    glimepiride (AMARYL) 4 mg tablet, Take 1 tablet (4 mg total) by mouth 2 (two) times a day, Disp: 180 tablet, Rfl: 3    Lancets (freestyle) lancets, Use as instructed, Disp: 100 each, Rfl: 11    levothyroxine 25 mcg tablet, Take 1 tablet (25 mcg total) by mouth daily, Disp: 90 tablet, Rfl: 3    sitaGLIPtin (JANUVIA) 25 mg tablet, Take 1 tablet (25 mg total) by mouth daily, Disp: 90 tablet, Rfl: 3    nitrofurantoin (MACROBID) 100 mg capsule, Take 100 mg by mouth 2 (two) times a day, Disp: , Rfl:     olopatadine HCl (PATADAY) 0 2 % opth drops, , Disp: , Rfl:     Olopatadine HCl (Pazeo) 0 7 % SOLN, Administer 1 drop into the left eye daily at bedtime (Patient not taking: Reported on 12/30/2020), Disp: 5 mL, Rfl: 1    Omega-3 Fatty Acids (fish oil) 1,000 mg, Take 2 capsules (2,000 mg total) by mouth daily (Patient not taking: Reported on 12/30/2020), Disp: 180 capsule, Rfl: 3    pantoprazole (PROTONIX) 20 mg tablet, Take 1 tablet (20 mg total) by mouth daily before breakfast, Disp: 90 tablet, Rfl: 3  No Known Allergies    Labs:  Appointment on 12/30/2020   Component Date Value    WBC 12/30/2020 9 71     RBC 12/30/2020 5 00     Hemoglobin 12/30/2020 15 4     Hematocrit 12/30/2020 46 3*    MCV 12/30/2020 93     MCH 12/30/2020 30 8     MCHC 12/30/2020 33 3     RDW 12/30/2020 12 3     Platelets 93/84/9886 240     MPV 12/30/2020 9 5     Sodium 12/30/2020 136     Potassium 12/30/2020 4 2     Chloride 12/30/2020 101     CO2 12/30/2020 26     ANION GAP 12/30/2020 9     BUN 12/30/2020 21     Creatinine 12/30/2020 0 84     Glucose 12/30/2020 217*    Calcium 12/30/2020 10 4*    eGFR 12/30/2020 60    Admission on 11/17/2020, Discharged on 11/20/2020   Component Date Value    Hemoglobin A1C 11/18/2020 9 1*    EAG 11/18/2020 214     Cholesterol 11/18/2020 135     Triglycerides 11/18/2020 200*    HDL, Direct 11/18/2020 49     LDL Calculated 11/18/2020 46     POC Glucose 11/18/2020 211*    POC Glucose 11/18/2020 262*    POC Glucose 11/18/2020 268*    POC Glucose 11/18/2020 172*    POC Glucose 11/18/2020 185*    WBC 11/19/2020 6 80     RBC 11/19/2020 4 39     Hemoglobin 11/19/2020 13 3     Hematocrit 11/19/2020 40 9     MCV 11/19/2020 93     MCH 11/19/2020 30 3     MCHC 11/19/2020 32 5     RDW 11/19/2020 12 2     Platelets 22/00/6357 206     MPV 11/19/2020 9 3     Sodium 11/19/2020 138     Potassium 11/19/2020 3 8     Chloride 11/19/2020 104     CO2 11/19/2020 23     ANION GAP 11/19/2020 11     BUN 11/19/2020 15     Creatinine 11/19/2020 0 86     Glucose 11/19/2020 250*    Calcium 11/19/2020 9 7     eGFR 11/19/2020 58     POC Glucose 11/19/2020 188*    POC Glucose 11/19/2020 169*    POC Glucose 11/19/2020 177*    POC Glucose 11/19/2020 196*    WBC 11/20/2020 6 47     RBC 11/20/2020 4 45     Hemoglobin 11/20/2020 13 6     Hematocrit 11/20/2020 41 3     MCV 11/20/2020 93     MCH 11/20/2020 30 6     MCHC 11/20/2020 32 9     RDW 11/20/2020 12 3     Platelets 18/46/3157 197     MPV 11/20/2020 9 1     Sodium 11/20/2020 139     Potassium 11/20/2020 3 8     Chloride 11/20/2020 104     CO2 11/20/2020 25     ANION GAP 11/20/2020 10     BUN 11/20/2020 15     Creatinine 11/20/2020 0 89     Glucose 11/20/2020 237*    Calcium 11/20/2020 9 3     eGFR 11/20/2020 56     POC Glucose 11/20/2020 260*    SARS-CoV-2 11/20/2020 Negative     INFLUENZA A PCR 11/20/2020 Negative     INFLUENZA B PCR 11/20/2020 Negative     RSV PCR 11/20/2020 Negative     POC Glucose 11/20/2020 240*    POC Glucose 11/20/2020 239*   Admission on 11/17/2020, Discharged on 11/17/2020   Component Date Value    WBC 11/17/2020 8 22     RBC 11/17/2020 4 88     Hemoglobin 11/17/2020 15 1     Hematocrit 11/17/2020 43 2     MCV 11/17/2020 89     MCH 11/17/2020 30 9     MCHC 11/17/2020 35 0     RDW 11/17/2020 12 3     MPV 11/17/2020 9 5     Platelets 11/10/3228 226     Neutrophils Relative 11/17/2020 78*    Immat GRANS % 11/17/2020 0     Lymphocytes Relative 11/17/2020 16     Monocytes Relative 11/17/2020 6     Eosinophils Relative 11/17/2020 0     Basophils Relative 11/17/2020 0     Neutrophils Absolute 11/17/2020 6 36     Immature Grans Absolute 11/17/2020 0 02     Lymphocytes Absolute 11/17/2020 1 28     Monocytes Absolute 11/17/2020 0 52     Eosinophils Absolute 11/17/2020 0 03     Basophils Absolute 11/17/2020 0 01     Sodium 11/17/2020 133     Potassium 11/17/2020 3 5     Chloride 11/17/2020 100     CO2 11/17/2020 22     ANION GAP 11/17/2020 11     BUN 11/17/2020 11     Creatinine 11/17/2020 0 58     Glucose 11/17/2020 243*    Calcium 11/17/2020 9 9     AST 11/17/2020 21     ALT 11/17/2020 25     Alkaline Phosphatase 11/17/2020 74 8     Total Protein 11/17/2020 7 4     Albumin 11/17/2020 4 3     Total Bilirubin 11/17/2020 0 57     eGFR 11/17/2020 80     Troponin I 11/17/2020 <0 03     TSH 3RD GENERATON 11/17/2020 2 061     Color, UA 11/17/2020 Yellow     Clarity, UA 11/17/2020 Slightly Cloudy*    Specific Gravity, UA 11/17/2020 1 020     pH, UA 11/17/2020 6 0     Leukocytes, UA 11/17/2020 Negative     Nitrite, UA 11/17/2020 Negative     Protein, UA 11/17/2020 2+*    Glucose, UA 11/17/2020 1+*    Ketones, UA 11/17/2020 15 (1+)*    Urobilinogen, UA 11/17/2020 0 2     Bilirubin, UA 11/17/2020 Negative     Blood, UA 11/17/2020 Negative     Total CK 11/17/2020 85 9     LACTIC ACID 11/17/2020 1 6     Magnesium 11/17/2020 1 9     SARS-CoV-2 11/17/2020 Negative     INFLUENZA A PCR 11/17/2020 Negative     INFLUENZA B PCR 11/17/2020 Negative     RSV PCR 11/17/2020 Negative     Blood Culture 11/17/2020 No Growth After 5 Days   Blood Culture 11/17/2020 No Growth After 5 Days       RBC, UA 11/17/2020 None Seen     WBC, UA 11/17/2020 None Seen     Epithelial Cells 11/17/2020 None Seen     Bacteria, UA 11/17/2020 Occasional     POC Glucose 11/17/2020 187*    Ventricular Rate 11/17/2020 64     Atrial Rate 11/17/2020 64     IA Interval 11/17/2020 50     QRSD Interval 11/17/2020 90     QT Interval 11/17/2020 553     QTC Interval 11/17/2020 571     P Axis 11/17/2020 66     QRS Axis 11/17/2020 -29     T Wave [place] 11/17/2020 18    Appointment on 10/01/2020   Component Date Value    WBC 10/01/2020 7 83     RBC 10/01/2020 4 84     Hemoglobin 10/01/2020 14 8     Hematocrit 10/01/2020 43 5     MCV 10/01/2020 90     MCH 10/01/2020 30 6     MCHC 10/01/2020 34 0     RDW 10/01/2020 12 7     MPV 10/01/2020 9 8     Platelets 93/23/5619 260     Neutrophils Relative 10/01/2020 54     Immat GRANS % 10/01/2020 0     Lymphocytes Relative 10/01/2020 31     Monocytes Relative 10/01/2020 11     Eosinophils Relative 10/01/2020 3     Basophils Relative 10/01/2020 1     Neutrophils Absolute 10/01/2020 4 30     Immature Grans Absolute 10/01/2020 0 02     Lymphocytes Absolute 10/01/2020 2 39     Monocytes Absolute 10/01/2020 0 83     Eosinophils Absolute 10/01/2020 0 25     Basophils Absolute 10/01/2020 0 04     Sodium 10/01/2020 138     Potassium 10/01/2020 4 0     Chloride 10/01/2020 104     CO2 10/01/2020 25     ANION GAP 10/01/2020 9     BUN 10/01/2020 13     Creatinine 10/01/2020 0 68     Glucose, Fasting 10/01/2020 177*    Calcium 10/01/2020 10 2     AST 10/01/2020 25     ALT 10/01/2020 29     Alkaline Phosphatase 10/01/2020 68 0     Total Protein 10/01/2020 7 4     Albumin 10/01/2020 4 4     Total Bilirubin 10/01/2020 0 46     eGFR 10/01/2020 76     Cholesterol 10/01/2020 158     Triglycerides 10/01/2020 248 9*    HDL, Direct 10/01/2020 44*    LDL Calculated 10/01/2020 64     Non-HDL-Chol (CHOL-HDL) 10/01/2020 114     TSH 3RD GENERATON 10/01/2020 3 010     Vit D, 25-Hydroxy 10/01/2020 9 1*   Orders Only on 09/21/2020   Component Date Value    Hemoglobin A1C 09/21/2020 8 6*     Lab Results   Component Value Date    TRIG 200 (H) 11/18/2020    HDL 49 11/18/2020     Imaging: No results found  Review of Systems:  Review of Systems   Constitutional: Negative for activity change, appetite change, chills, diaphoresis, fatigue and unexpected weight change  HENT: Negative for hearing loss, nosebleeds and sore throat  Eyes: Negative for photophobia and visual disturbance  Respiratory: Negative for cough, chest tightness, shortness of breath and wheezing  Cardiovascular: Negative for chest pain, palpitations and leg swelling  Gastrointestinal: Negative for abdominal pain, diarrhea, nausea and vomiting  Endocrine: Negative for polyuria  Genitourinary: Negative for dysuria, frequency and hematuria  Musculoskeletal: Negative for arthralgias, back pain, gait problem and neck pain  Skin: Negative for pallor and rash  Neurological: Positive for headaches   Negative for dizziness and syncope  Hematological: Does not bruise/bleed easily  Psychiatric/Behavioral: Negative for behavioral problems and confusion  Physical Exam:  Physical Exam  Vitals signs reviewed  Constitutional:       Appearance: She is well-developed  She is not diaphoretic  HENT:      Head: Normocephalic and atraumatic  Nose: Nose normal    Eyes:      General: No scleral icterus  Pupils: Pupils are equal, round, and reactive to light  Neck:      Musculoskeletal: Normal range of motion and neck supple  Vascular: No JVD  Cardiovascular:      Rate and Rhythm: Normal rate and regular rhythm  Heart sounds: Normal heart sounds  No murmur  No friction rub  No gallop  Pulmonary:      Effort: Pulmonary effort is normal  No respiratory distress  Breath sounds: Normal breath sounds  No wheezing or rales  Abdominal:      General: Bowel sounds are normal  There is no distension  Palpations: Abdomen is soft  Tenderness: There is no abdominal tenderness  Musculoskeletal: Normal range of motion  General: No deformity  Skin:     General: Skin is warm and dry  Findings: No rash  Neurological:      Mental Status: She is alert and oriented to person, place, and time  Cranial Nerves: No cranial nerve deficit  Psychiatric:         Behavior: Behavior normal        Blood pressure 130/80, pulse 64, height 4' 10" (1 473 m), weight 65 7 kg (144 lb 12 8 oz)  EKG:  Sinus rhythm  Left axis deviation  Nonspecific ST and T wave abnormality  Abnormal ECG    Discussion/Summary:  PAF: presumed, considering prior PAT and with acute stroke  Continued on Eliquis for [facility]  EF normal on echo in Nov 2020  HTN: well controlled, continue current regimen of amlodipine  HLD: continued on statin  LDL 46 in Nov 2020

## 2021-02-25 DIAGNOSIS — I48.0 PAROXYSMAL ATRIAL FIBRILLATION (HCC): ICD-10-CM

## 2021-02-25 DIAGNOSIS — I63.9 CVA (CEREBRAL VASCULAR ACCIDENT) (HCC): Chronic | ICD-10-CM

## 2021-03-04 ENCOUNTER — OFFICE VISIT (OUTPATIENT)
Dept: FAMILY MEDICINE CLINIC | Facility: CLINIC | Age: 86
End: 2021-03-04
Payer: MEDICARE

## 2021-03-04 VITALS
SYSTOLIC BLOOD PRESSURE: 138 MMHG | HEIGHT: 59 IN | WEIGHT: 147.2 LBS | HEART RATE: 62 BPM | OXYGEN SATURATION: 96 % | DIASTOLIC BLOOD PRESSURE: 70 MMHG | TEMPERATURE: 97.9 F | RESPIRATION RATE: 12 BRPM | BODY MASS INDEX: 29.68 KG/M2

## 2021-03-04 DIAGNOSIS — E11.9 TYPE 2 DIABETES MELLITUS WITHOUT COMPLICATION, WITHOUT LONG-TERM CURRENT USE OF INSULIN (HCC): ICD-10-CM

## 2021-03-04 DIAGNOSIS — E03.8 OTHER SPECIFIED HYPOTHYROIDISM: ICD-10-CM

## 2021-03-04 DIAGNOSIS — E55.9 VITAMIN D DEFICIENCY: ICD-10-CM

## 2021-03-04 DIAGNOSIS — E11.69 TYPE 2 DIABETES MELLITUS WITH OTHER SPECIFIED COMPLICATION, WITHOUT LONG-TERM CURRENT USE OF INSULIN (HCC): ICD-10-CM

## 2021-03-04 DIAGNOSIS — I63.9 CEREBROVASCULAR ACCIDENT (CVA), UNSPECIFIED MECHANISM (HCC): Primary | ICD-10-CM

## 2021-03-04 DIAGNOSIS — E78.2 ELEVATED CHOLESTEROL WITH ELEVATED TRIGLYCERIDES: ICD-10-CM

## 2021-03-04 LAB — SL AMB POCT HEMOGLOBIN AIC: 10.1 (ref ?–6.5)

## 2021-03-04 PROCEDURE — 83036 HEMOGLOBIN GLYCOSYLATED A1C: CPT | Performed by: NURSE PRACTITIONER

## 2021-03-04 PROCEDURE — 99214 OFFICE O/P EST MOD 30 MIN: CPT | Performed by: NURSE PRACTITIONER

## 2021-03-04 NOTE — PATIENT INSTRUCTIONS
A-fib (Atrial Fibrillation)   WHAT YOU NEED TO KNOW:   What is atrial fibrillation (a-fib)? A-fib is an irregular heartbeat  It reduces your heart's ability to pump blood through your body  A-fib may come and go, or it may be a long-term condition  A-fib can cause life-threatening blood clots, stroke, or heart failure  It is important to treat and manage a-fib to help prevent these problems  What increases my risk for a-fib? · High blood pressure, heart failure, or heart valve disease    · Smoking    · COPD, sleep apnea, a blood clot in your lung, or other lung diseases     · Diabetes, obesity, or thyroid disease    · Heavy alcohol use or large amounts of caffeine    · Age 72 years or older     · A family history of a-fib or other heart problems    What are the signs and symptoms of a-fib? · A heartbeat that races, pounds, or flutters    · Weakness, severe tiredness, or confusion    · Feeling lightheaded, sweaty, dizzy, or faint    · Shortness of breath or anxiety    · Chest pain or pressure    How is a-fib diagnosed? Your healthcare provider will examine you  Tell the provider about your symptoms, health conditions, and medicines  Tell the provider if you drink alcohol, smoke cigarettes, or use any illegal drugs  You will need an EKG to check your heart rhythm and how fast your heart beats  You may also need to wear a Holter monitor at home while you do your usual activities  The Holter monitor is a portable EKG machine  How is a-fib treated? Conditions that cause a-fib, such as thyroid disease, will be treated  You may also need any of the following:  · Heart medicines  help control your heart rate or rhythm  You may need more than one medicine to treat your symptoms  · Antiplatelet or blood thinner medicines  help prevent blood clots and stroke  · Cardioversion  is a procedure to return your heart rate and rhythm to normal  It can be done using medicines or electric shock       · A-fib ablation  is a procedure that uses energy to burn a small area of heart tissue  This creates scar tissue and prevents electrical signals that cause a-fib  You may need this procedure more than once  Ask for more information on a-fib ablation  · A pacemaker  may be inserted into your heart  A pacemaker is a device that controls your heartbeat  A pacemaker may be inserted during an ablation procedure or surgery  Ask your healthcare provider for more information on pacemakers  · Surgery  may be needed if other procedures do not work  During surgery your healthcare provider will make cuts in the upper part of your heart  The provider will stitch the cuts together to create scar tissue  The scar tissue will prevent electrical signals that cause a-fib  How can I manage a-fib? · Know your target heart rate  Learn how to check your pulse and monitor your heart rate  · Know the risks if you choose to drink alcohol  Alcohol can make a-fib hard to manage  Ask your healthcare provider if it is safe for you to drink alcohol  A drink of alcohol is 12 ounces of beer, 5 ounces of wine, or 1½ ounces of liquor  · Do not smoke  Nicotine can cause heart damage and make it more difficult to manage your a-fib  Do not use e-cigarettes or smokeless tobacco in place of cigarettes or to help you quit  They still contain nicotine  Ask your healthcare provider for information if you currently smoke and need help quitting  · Eat heart-healthy foods  Heart healthy foods will help keep your cholesterol low  These include fruits, vegetables, whole-grain breads, low-fat dairy products, beans, lean meats, and fish  Replace butter and margarine with heart-healthy oils such as olive oil and canola oil  · Maintain a healthy weight  Ask your healthcare provider how much you should weigh  Ask him or her to help you create a safe weight loss plan if you are overweight   Even a small goal of a 10% weight loss can improve your heart health  · Get regular physical activity  Physical activity helps improve your heart health  Get at least 150 minutes of moderate aerobic physical activity each week  Your healthcare provider can help you create an activity plan  · Manage other health conditions  This includes high blood pressure or cholesterol, sleep apnea, diabetes, and other heart conditions  Take medicine as directed and follow your treatment plan  Call your local emergency number [phone] in the [address] [facility]) if:   · You have any of the following signs of a heart attack:      ? Squeezing, pressure, or pain in your chest    ? You may  also have any of the following:     ? Discomfort or pain in your back, neck, jaw, stomach, or arm    ? Shortness of breath    ? Nausea or vomiting    ? Lightheadedness or a sudden cold sweat    · You have any of the following signs of a stroke:      ? Numbness or drooping on one side of your face     ? Weakness in an arm or leg    ? Confusion or difficulty speaking    ? Dizziness, a severe headache, or vision loss    When should I call my cardiologist?   · Your arm or leg feels warm, tender, and painful  It may look swollen and red  · Your heart rate is more than 110 beats per minute  · You have new or worsening swelling in your legs, feet, ankles, or abdomen  · You are short of breath, even at rest      · You have questions or concerns about your condition or care  CARE AGREEMENT:   You have the right to help plan your care  Learn about your health condition and how it may be treated  Discuss treatment options with your healthcare providers to decide what care you want to receive  You always have the right to refuse treatment  The above information is an  only  It is not intended as medical advice for individual conditions or treatments  Talk to your doctor, nurse or pharmacist before following any medical regimen to see if it is safe and effective for you    © Copyright [employer] [facility] Information is for [employer] use only and may not be sold, redistributed or otherwise used for commercial purposes  All illustrations and images included in CareNotes® are the copyrighted property of A D A M , Inc  or [employer]   Hypertension and Diabetes   WHAT YOU NEED TO KNOW:   What do I need to know about hypertension and diabetes? Hypertension is high blood pressure (BP)  Hypertension is common in persons with diabetes  This type of hypertension is called secondary hypertension  A normal BP is 119/79 or lower  You can control hypertension and diabetes with a healthy lifestyle, or a combination of lifestyle and medicine  Controlled BP and blood sugar levels help prevent certain complications from diabetes  Examples include retinopathy (eye damage) and kidney damage  What increases my risk for hypertension with diabetes? · The type of diabetes you have and how long you have had diabetes    · Uncontrolled blood sugar levels    · Other medical conditions, such as kidney disease, thyroid disease, or sleep apnea    · Older age, or being male or     · Obesity or lack of exercise    · A family history of hypertension or heart disease     · Too many high-sodium foods    · Stress     · Use of tobacco, alcohol, or illegal drugs     · Certain medicines, such as steroids or birth control pills    What are the signs and symptoms of hypertension in persons with diabetes? You may have no signs or symptoms, or you may have any of the following:  · Headache     · Blurred vision     · Chest pain     · Dizziness or weakness     · Trouble breathing    · Nosebleeds    How is hypertension in persons with diabetes diagnosed? Your healthcare provider will ask about your symptoms and the medicines you take  He or she will also ask if you have a family history of high BP and about any health conditions you have  He or she will also check your BP   If your BP is high you will need to have it confirmed at another visit  Your healthcare provider will also check your weight and examine your heart, lungs, and eyes  You may need any of the following tests:  · An ambulatory blood pressure monitor (ABPM)  is a device that you wear  ABPM measures your BP while you do your regular daily activities  It records your blood pressure every 15 to 30 minutes during the day  It also records your BP every 15 minutes to 1 hour at night  The recorded BP's help your healthcare provider know if you have hypertension not seen at your appointment  · Blood tests  may help healthcare providers find the cause of your hypertension  Blood tests can also help find other health problems caused by hypertension  · Urine tests  will be done to check your kidney function  Kidney problems can increase your risk for hypertension  How is hypertension in persons with diabetes treated? Your healthcare provider will recommend lifestyle changes to lower your BP  You may also need the following medicines:  · Medicine  may be used to help lower your BP  You may need more than one type of medicine  Take the medicine exactly as directed  · Diuretics  help decrease extra fluid that collects in your body  This will help lower your BP  You may urinate more often while you take this medicine  · Cholesterol medicine  helps lower your cholesterol level  A low cholesterol level helps prevent heart disease and makes it easier to control your BP  How can I manage hypertension and diabetes? Talk with your healthcare provider about these and other ways to manage hypertension and diabetes:  · Check your BP at home  Avoid smoking, caffeine, and exercise at least 30 minutes before checking your BP  Sit and rest for 5 minutes before you take your blood pressure  Extend your arm and support it on a flat surface  Your arm should be at the same level as your heart  Follow the directions that came with your BP monitor   Check your BP 2 times, 1 minute apart, before you take your medicine in the morning  Also check your BP before your evening meal  Keep a record of your readings and bring it to your follow-up visits  Ask your healthcare provider what your BP should be  · Check your blood sugar level at home  Follow your healthcare provider's instructions and check your blood sugar level as directed  Keep a record of your blood sugar level readings and bring it to your follow-up visits  Ask your healthcare provider what your blood sugar levels should be  · Manage any other health conditions you have  Health conditions such as kidney disease, thyroid disease, or adrenal gland disorder can increase your BP and blood sugar levels  Follow your healthcare provider's instructions and take all your medicines as directed  What lifestyle changes can I make? Talk with your healthcare provider about these and other lifestyle changes for hypertension and diabetes:  · Limit sodium (salt) as directed  Too much sodium can affect your fluid balance  Check labels to find low-sodium or no-salt-added foods  Some low-sodium foods use potassium salts for flavor  Too much potassium can also cause health problems  Your healthcare provider will tell you how much sodium and potassium are safe for you to have in a day  He or she may recommend that you limit sodium to 2,300 mg a day  · Follow the meal plan recommended by your healthcare provider  A dietitian or your provider can help you create healthy meal plans  The plans will help you control sodium, carbohydrates, and fats in your meals  This can help you control both your blood sugar and BP levels  The plans usually include eating more fruits, vegetables, and low-fat dairy products  Your provider may talk to you about a Mediterranean style and Dietary Approaches to Stop Hypertension (DASH) eating plans  These eating plans can help you with weight loss and lowering your cholesterol           · Get regular physical activity  Physical activity can help decrease your blood sugar level  It can also help to decrease your risk for heart disease and help you lose weight  Adults should have moderate intensity physical activity for at least 150 minutes every week  Spread the amount of activity over at least 3 days a week  Do not skip more than 2 days in a row  Children should get at least 60 minutes of moderate physical activity on most days of the week  Examples of moderate physical activity include brisk walking, running, and swimming  Do not sit for longer than 30 minutes  Work with your healthcare provider to create a plan for physical activity  · Decrease stress  This may help lower your BP  Learn ways to relax, such as deep breathing or listening to music  Yoga and meditation may also help  Talk to your healthcare provider about ways to decrease stress  · Know the risks if you choose to drink alcohol  Alcohol can cause your blood sugar levels to be low if you use insulin  Alcohol can cause high blood sugar and BP levels, and weight gain if you drink too much  Women 21 years or older and men 72 years or older should limit alcohol to 1 drink a day  Men aged 24 to 59 years should limit alcohol to 2 drinks a day  A drink of alcohol is 12 ounces of beer, 5 ounces of wine, or 1½ ounces of liquor  · Do not smoke  Nicotine and other chemicals in cigarettes and cigars can increase your BP and make your blood sugar levels harder to control  Ask your healthcare provider for information if you currently smoke and need help to quit  E-cigarettes or smokeless tobacco still contain nicotine  Talk to your healthcare provider before you use these products  Call or have someone call your local emergency number (911 in the [address] [place] [address],[address]) for any of the following:   You have any of the following signs of a heart attack:   · Squeezing, pressure, or pain in your chest    · You may  also have any of the following: ? Discomfort or pain in your back, neck, jaw, stomach, or arm    ? Shortness of breath    ? Nausea or vomiting    ? Lightheadedness or a sudden cold sweat  You have any of the following signs of a stroke:   · Numbness or drooping on one side of your face     · Weakness in an arm or leg    · Confusion or difficulty speaking    · Dizziness, a severe headache, or vision loss    When should I call my doctor or diabetes care team?   · You feel faint, dizzy, confused, or drowsy  · You have been taking your BP medicine and your BP is still higher than your healthcare provider says it should be  · You have questions or concerns about your condition or care  CARE AGREEMENT:   You have the right to help plan your care  Learn about your health condition and how it may be treated  Discuss treatment options with your healthcare providers to decide what care you want to receive  You always have the right to refuse treatment  The above information is an  only  It is not intended as medical advice for individual conditions or treatments  Talk to your doctor, nurse or pharmacist before following any medical regimen to see if it is safe and effective for you  © Copyright [address] Information is for End User's use only and may not be sold, redistributed or otherwise used for commercial purposes  All illustrations and images included in CareNotes® are the copyrighted property of [employer]  or [facility] [employer]   Diabetic Foot Ulcers   WHAT YOU NEED TO KNOW:   What is a diabetic foot ulcer? A diabetic foot ulcer can be redness over a bony area or an open sore  The ulcer can develop anywhere on your foot or toes  Ulcers usually develop on the bottom of the foot  You may not know you have an ulcer until you notice drainage on your sock  Drainage is fluid that may be yellow, brown, or red  The fluid may also contain pus or blood  What increases my risk for a diabetic foot ulcer?    · Blood sugar levels that are not controlled    · Nerve damage and numbness in your feet    · Poor blood flow     · A foot deformity, such as a bunion or hammertoe    · Calluses or corns on your feet or toes    · A decrease in vision that keeps you from seeing your feet clearly    · Being overweight    · Cigarette smoking or alcohol use    How is a diabetic foot ulcer diagnosed and treated? Your healthcare provider will ask about your symptoms and examine your foot and the ulcer  He or she may check your shoes  He or she may also send you to a podiatrist (foot doctor) for treatment  The goal of treatment is to start healing your foot ulcer as soon as possible  The risk for infection decreases with faster healing  Do the following to help your ulcer heal:  · Prevent or treat an infection  A bandage will be put on your ulcer  Your healthcare provider will give you instructions on changing your bandage  You may need to clean the wound and change the bandage daily  The bandage may contain medicines to help your ulcer heal  You may be asked to put medicine on your foot ulcer before you put on the bandage  The medicine may also prevent growth of tissue that is not healthy  If you have an infection, your healthcare provider will give you antibiotics to treat it  · Have any dead tissue debrided (removed)  The removal of dead skin and tissue around your foot ulcer can help with healing  · Manage your blood sugar levels and other health problems  Your blood sugar, blood pressure, and cholesterol levels need to be controlled to help your foot ulcer heal  Your healthcare provider can help you make a plan to manage your health problems  · Offload (take the pressure off) the foot ulcer  You may need special shoes with insoles, cushions, or braces  You may be asked to use a wheelchair or crutches until your foot ulcer heals  These items will help keep pressure and irritation off the area of your foot ulcer   Your foot ulcer can heal faster without pressure and irritation  · Have blood flow to your foot increased  Your healthcare provider may use hyperbaric oxygen therapy or negative pressure wound therapy to increase blood flow  Ask for more information about these therapies  · Go to specialists as directed  Your healthcare provider may recommend you see a podiatrist, or an orthopedic or vascular surgeon  These healthcare providers can help manage your treatment  What can I do to prevent diabetic foot ulcers? Good foot care may help prevent ulcers, or keep them from getting worse  Ask someone to help you if you are not able to check or care for your feet  The following can help you prevent diabetic foot ulcers:  · Keep your blood sugar levels under control  Continue the plan for your diabetes that you and your healthcare provider have discussed  Healthy food choices and taking your medicines as directed may help control blood sugars  Contact your healthcare provider if your blood sugar levels are higher than directed  · Wash your feet each day with soap and warm water  Do not use hot water, because this can injure your foot  Dry your feet gently with a towel after you wash them  Dry between and under your toes  · Apply lotion or a moisturizer on your dry feet  Ask your healthcare provider what lotions are best to use  Do not put lotion or moisturizer between your toes  Moisture between your toes could lead to skin breakdown  · Check your feet each day  Look at your whole foot, including the bottom, and between and under your toes  Check for wounds, corns, and calluses  Feel your feet by running your hands along the tops, bottoms, sides, and between your toes  Use a nonbreakable mirror to check your feet if you have trouble seeing the bottoms  Do not try to remove corns or calluses yourself  File or cut your toenails straight across  · Protect your feet    Do not walk barefoot or wear your shoes without socks  Check your shoes for rocks or other objects that can hurt your feet  Wear cotton socks to help keep your feet dry  Wear socks without toe seams, or wear them with the seams inside out  Change your socks each day  Do not wear socks that are dirty or damp  · Wear shoes that fit well  Wear shoes that do not rub against any area of your feet  Your shoes should be ½ to ¾ inch (1 to 2 centimeters) longer than your feet  Your shoes should also have extra space around the widest part of your feet  Walking or athletic shoes with laces or straps that adjust are best  Ask your healthcare provider for help to choose the right shoes for you  Ask him or her if you need to wear an insert, orthotic, or bandage on your feet  · Do not smoke  Nicotine can damage your blood vessels and increase your risk for foot ulcers  Do not use e-cigarettes or smokeless tobacco in place of cigarettes or to help you quit  They still contain nicotine  Ask your healthcare provider for information if you currently smoke and need help quitting  · Know the risks if you choose to drink alcohol  Alcohol can cause your blood sugar levels to be low if you use insulin  Alcohol can cause high blood sugar levels and weight gain if you drink too much  A drink of alcohol is 12 ounces of beer, 5 ounces of wine, or 1½ ounces of liquor  · Maintain a healthy weight  Ask your healthcare provider how much you should weigh  A healthy weight can help you control your diabetes  Ask him or her to help you create a weight loss plan if you are overweight  Even a 10 to 15 pound weight loss can help you better manage your blood sugar level  Call your local emergency number (911 in the [address] [facility]) if:   · You have a fever with chills  · You begin vomiting  · You feel faint or become confused  When should I call my doctor? · You see new drainage on your sock  · Your foot becomes red, warm, and swollen       · Your foot ulcer has a bad smell or is draining pus  · You feel pain in a foot that used to have little or no feeling  · You see black or dead tissue in or around your ulcer  · Your ulcer becomes bigger, deeper, or does not heal      · You have questions or concerns about your condition or care  CARE AGREEMENT:   You have the right to help plan your care  Learn about your health condition and how it may be treated  Discuss treatment options with your healthcare providers to decide what care you want to receive  You always have the right to refuse treatment  The above information is an  only  It is not intended as medical advice for individual conditions or treatments  Talk to your doctor, nurse or pharmacist before following any medical regimen to see if it is safe and effective for you  © Copyright [address] Information is for End User's use only and may not be sold, redistributed or otherwise used for commercial purposes  All illustrations and images included in CareNotes® are the copyrighted property of [employer] D A M , Inc  or [facility] [employer]   Diabetes and Exercise   WHAT [place]:   How will exercise help me manage diabetes? Physical activity, such as exercise, can help keep your blood sugar level steady or improve insulin resistance  Activity can help decrease your risk for heart disease, and help you lose weight  Exercise can also help lower your A1c  Your diabetes care team will help you create an exercise plan  The plan will be based on the type of diabetes you have and your starting fitness level  What are some tips to help me create and meet my exercise goals? · Set a goal for 150 minutes (2 5 hours) of moderate to vigorous aerobic activity each week  Aerobic activity helps your heart stay strong  Aerobic activity includes walking, bicycling, dancing, swimming, and raking leaves  Spread aerobic activity over 3 to 5 days  Do not take more than 2 days off in a row   It is best to do at least 10 minutes at a time and 30 minutes each day  You can work up to these goals  Remember that any activity is better than no activity  Over time, you can make exercise more intense or last longer  You can also add more days of exercise as your fitness level improves  Your diabetes care team can help you make a step-by-step plan to achieve your goals  · Set a strength training goal of 2 to 3 times a week  Take at least 1 day off in between strength training sessions  Strength training helps you keep the muscles you have and build new muscles  Strength training includes lifting weights, climbing stairs, yoga, and north chi     · Older adults should include balance training 2 to 3 times each week  These include walking backwards, standing on one foot, and walking heel to toe in a straight line  What are some other healthy exercise tips? · Stretch before and after you exercise to prevent injury  · Drink water or liquids that do not contain sugar before, during, and after exercise  Ask your dietitian or healthcare provider which liquids you should drink when you exercise  · Do not sit for longer than 30 minutes at a time during your day  If you cannot walk around, at least stand up  This will help you stay active and keep your blood circulating  What do I need to know about exercise and my blood sugar levels? Check your blood sugar level before and after exercise, if you use insulin  Healthcare providers may tell you to change the amount of insulin you take or food you eat  · If your blood sugar level is high, check your blood or urine for ketones before you exercise  Do not exercise if your blood sugar level is high and you have ketones  · If your blood sugar level is less than 100 mg/dL, have a carbohydrate snack before you exercise  Examples are 4 to 6 crackers, ½ banana, 8 ounces (1 cup) of milk, or 4 ounces (½ cup) of juice         Call your local emergency number (911 in the [address] [place] [address] Rd,[address]) if: · You have chest pain or shortness of breath  When should I seek immediate care? · You have a low blood sugar level and it does not improve with treatment  Symptoms are trouble thinking, a pounding heartbeat, and sweating  · Your blood sugar level is above 240 mg/dL and does not come down within 15 minutes of treatment  · You have blurred or double vision  · Your breath has a fruity, sweet smell, or your breathing is shallow  When should I call my doctor or diabetes care team?   · You have ketones in your blood or urine  · You have a fever  · Your blood sugar levels are higher than your target goals  · You often have low blood sugar levels  · Your skin is red, dry, warm, or swollen  · You have a wound that does not heal     · You have trouble coping with diabetes, or you feel anxious or depressed  · You have questions or concerns about your condition or care  CARE AGREEMENT:   You have the right to help plan your care  Learn about your health condition and how it may be treated  Discuss treatment options with your healthcare providers to decide what care you want to receive  You always have the right to refuse treatment  The above information is an  only  It is not intended as medical advice for individual conditions or treatments  Talk to your doctor, nurse or pharmacist before following any medical regimen to see if it is safe and effective for you  © Copyright [address] Information is for End User's use only and may not be sold, redistributed or otherwise used for commercial purposes  All illustrations and images included in CareNotes® are the copyrighted property of [employer]  or [facility] Tio Foster   Hyperlipidemia   WHAT YOU NEED TO KNOW:   What is hyperlipidemia? Hyperlipidemia is a high level of lipids (fats) in your blood  These lipids include cholesterol or triglycerides  Lipids are made by your body   They also come from the foods you eat  Your body needs lipids to work properly, but high levels increase your risk for heart disease, heart attack, and stroke  What increases my risk for hyperlipidemia? · Family history of high lipid levels    · Diet high in saturated fats, cholesterol, or calories     · High alcohol intake or smoking    · Lack of regular physical activity    · Medical conditions such as hypothyroidism, obesity, or type 2 diabetes    · Certain medicines, such as blood pressure medicines, hormones, and steroids    How is hyperlipidemia managed and treated? Your healthcare provider may first recommend that you make lifestyle changes to help decrease your lipid levels  You may also need to take medicine to lower your lipid levels  Some of the lifestyle changes you may need to make include the following:  · Maintain a healthy weight  Ask your healthcare provider how much you should weigh  Ask him or her to help you create a weight loss plan if you are overweight  Weight loss can decrease your cholesterol and triglyceride levels  · Exercise as directed  Exercise lowers your cholesterol levels and helps you maintain a healthy weight  Get 30 minutes or more of aerobic exercise 4 to 6 days each week  You can split your exercise into four 10-minute workouts instead of 30 minutes at one time  Examples of aerobic exercises include walking briskly, swimming, or riding a bike  Work with your healthcare provider to plan the best exercise program for you  · Do not smoke  Nicotine and other chemicals in cigarettes and cigars can increase your risk for a heart attack and stroke  Ask your healthcare provider for information if you currently smoke and need help to quit  E-cigarettes or smokeless tobacco still contain nicotine  Talk to your healthcare provider before you use these products  · Eat heart-healthy foods  Talk to your dietitian about a heart-healthy diet   The following will help you manage hyperlipidemia:     ? Decrease the total amount of fat you eat  Choose lean meats, fat-free or 1% fat milk, and low-fat dairy products, such as yogurt and cheese  Limit or do not eat red meat  Red meats are high in fat and cholesterol  ? Replace unhealthy fats with healthy fats  Unhealthy fats include saturated fat, trans fat, and cholesterol  Choose soft margarines that are low in saturated fat and have little or no trans fat  Monounsaturated fats are healthy fats  These are found in olive oil, canola oil, avocado, and nuts  Polyunsaturated fats are also healthy  These are found in fish, flaxseed, walnuts, and soybeans  ? Eat 5 or more servings of fruits and vegetables every day  They are low in calories and fat, and a good source of essential vitamins  Include dark green, red, and orange vegetables  Examples include spinach, kale, broccoli, and carrots  ? Eat foods high in fiber  Fiber can help lower your cholesterol levels  Choose whole grain, high-fiber foods  Good choices include whole-wheat breads or cereals, beans, peas, fruits, and vegetables  · Ask your healthcare provider if it is safe for you to drink alcohol  Alcohol can increase your cholesterol and triglyceride levels  A drink of alcohol is 12 ounces of beer, 5 ounces of wine, or 1½ ounces of liquor  Call 911 for any of the following:   · You have any of the following signs of a heart attack:      ? Squeezing, pressure, or pain in your chest    ? You may  also have any of the following:     ? Discomfort or pain in your back, neck, jaw, stomach, or arm    ? Shortness of breath    ? Nausea or vomiting    ? Lightheadedness or a sudden cold sweat    · You have any of the following signs of a stroke:      ? Numbness or drooping on one side of your face     ? Weakness in an arm or leg    ? Confusion or difficulty speaking    ? Dizziness, a severe headache, or vision loss    When should I contact my healthcare provider?    · You have questions or concerns about your condition or care  CARE AGREEMENT:   You have the right to help plan your care  Learn about your health condition and how it may be treated  Discuss treatment options with your healthcare providers to decide what care you want to receive  You always have the right to refuse treatment  The above information is an  only  It is not intended as medical advice for individual conditions or treatments  Talk to your doctor, nurse or pharmacist before following any medical regimen to see if it is safe and effective for you  © Copyright [address] Information is for End User's use only and may not be sold, redistributed or otherwise used for commercial purposes  All illustrations and images included in CareNotes® are the copyrighted property of [employer]  or [facility] Preventive Visit Patient Instructions  Thank you for completing your Welcome to Medicare Visit or Medicare Annual Wellness Visit today  Your next wellness visit will be due in one year (3/4/2022)  The screening/preventive services that you may require over the next 5-10 years are detailed below  Some tests may not apply to you based off risk factors and/or age  Screening tests ordered at today's visit but not completed yet may show as past due  Also, please note that scanned in results may not display below  Preventive Screenings:  Service Recommendations Previous Testing/Comments   Colorectal Cancer Screening  * Colonoscopy    * Fecal Occult Blood Test (FOBT)/Fecal Immunochemical Test (FIT)  * Fecal DNA/Cologuard Test  * Flexible Sigmoidoscopy Age: 54-65 years old   Colonoscopy: every 10 years (may be performed more frequently if at higher risk)  OR  FOBT/FIT: every 1 year  OR  Cologuard: every 3 years  OR  Sigmoidoscopy: every 5 years  Screening may be recommended earlier than age 48 if at higher risk for colorectal cancer   Also, an individualized decision between you and your healthcare provider will decide whether screening between the ages of 74-80 would be appropriate  Colonoscopy: Not on file  FOBT/FIT: Not on file  Cologuard: Not on file  Sigmoidoscopy: Not on file         Breast Cancer Screening Age: 36 years old  Frequency: every 1-2 years  Not required if history of left and right mastectomy Mammogram: Not on file       Cervical Cancer Screening Between the ages of 21-29, pap smear recommended once every 3 years  Between the ages of 33-67, can perform pap smear with HPV co-testing every 5 years  Recommendations may differ for women with a history of total hysterectomy, cervical cancer, or abnormal pap smears in past  Pap Smear: Not on file       Hepatitis C Screening Once for adults born between 1945 and 1965  More frequently in patients at high risk for Hepatitis C Hep C Antibody: Not on file       Diabetes Screening 1-2 times per year if you're at risk for diabetes or have pre-diabetes Fasting glucose: 177 mg/dL   A1C: 9 1 %       Cholesterol Screening Once every 5 years if you don't have a lipid disorder  May order more often based on risk factors  Lipid panel: 11/18/2020         Other Preventive Screenings Covered by Medicare:  1  Abdominal Aortic Aneurysm (AAA) Screening: covered once if your at risk  You're considered to be at risk if you have a family history of AAA  2  Lung Cancer Screening: covers low dose CT scan once per year if you meet all of the following conditions: (1) Age 50-69; (2) No signs or symptoms of lung cancer; (3) Current smoker or have quit smoking within the last 15 years; (4) You have a tobacco smoking history of at least 30 pack years (packs per day multiplied by number of years you smoked); (5) You get a written order from a healthcare provider    3  Glaucoma Screening: covered annually if you're considered high risk: (1) You have diabetes OR (2) Family history of glaucoma OR (3)  aged 48 and older OR (3)  American aged 72 and older  4  Osteoporosis Screening: covered every 2 years if you meet one of the following conditions: (1) You're estrogen deficient and at risk for osteoporosis based off medical history and other findings; (2) Have a vertebral abnormality; (3) On glucocorticoid therapy for more than 3 months; (4) Have primary hyperparathyroidism; (5) On osteoporosis medications and need to assess response to drug therapy  · Last bone density test (DXA Scan): Not on file  5  HIV Screening: covered annually if you're between the age of 12-76  Also covered annually if you are younger than 13 and older than 72 with risk factors for HIV infection  For pregnant patients, it is covered up to 3 times per pregnancy  Immunizations:  Immunization Recommendations   Influenza Vaccine Annual influenza vaccination during flu season is recommended for all persons aged >= 6 months who do not have contraindications   Pneumococcal Vaccine (Prevnar and Pneumovax)  * Prevnar = PCV13  * Pneumovax = PPSV23   Adults 25-60 years old: 1-3 doses may be recommended based on certain risk factors  Adults 72 years old: Prevnar (PCV13) vaccine recommended followed by Pneumovax (PPSV23) vaccine  If already received PPSV23 since turning 65, then PCV13 recommended at least one year after PPSV23 dose  Hepatitis B Vaccine 3 dose series if at intermediate or high risk (ex: diabetes, end stage renal disease, liver disease)   Tetanus (Td) Vaccine - COST NOT COVERED BY MEDICARE PART B Following completion of primary series, a booster dose should be given every 10 years to maintain immunity against tetanus  Td may also be given as tetanus wound prophylaxis  Tdap Vaccine - COST NOT COVERED BY MEDICARE PART B Recommended at least once for all adults  For pregnant patients, recommended with each pregnancy     Shingles Vaccine (Shingrix) - COST NOT COVERED BY MEDICARE PART B  2 shot series recommended in those aged 48 and above     Health Maintenance Due:  There are no preventive care reminders to display for this patient  Immunizations Due:      Topic Date Due    DTaP,Tdap,and Td Vaccines (1 - Tdap) 04/07/1948    Influenza Vaccine (1) 09/01/2020     Advance Directives   What are advance directives? Advance directives are legal documents that state your wishes and plans for medical care  These plans are made ahead of time in case you lose your ability to make decisions for yourself  Advance directives can apply to any medical decision, such as the treatments you want, and if you want to donate organs  What are the types of advance directives? There are many types of advance directives, and each state has rules about how to use them  You may choose a combination of any of the following:  · Living will: This is a written record of the treatment you want  You can also choose which treatments you do not want, which to limit, and which to stop at a certain time  This includes surgery, medicine, IV fluid, and tube feedings  · Durable power of  for healthcare [facility]): This is a written record that states who you want to make healthcare choices for you when you are unable to make them for yourself  This person, called a proxy, is usually a family member or a friend  You may choose more than 1 proxy  · Do not resuscitate (DNR) order:  A DNR order is used in case your heart stops beating or you stop breathing  It is a request not to have certain forms of treatment, such as CPR  A DNR order may be included in other types of advance directives  · Medical directive: This covers the care that you want if you are in a coma, near death, or unable to make decisions for yourself  You can list the treatments you want for each condition  Treatment may include pain medicine, surgery, blood transfusions, dialysis, IV or tube feedings, and a ventilator (breathing machine)  · Values history:   This document has questions about your views, beliefs, and how you feel and think about life  This information can help others choose the care that you would choose  Why are advance directives important? An advance directive helps you control your care  Although spoken wishes may be used, it is better to have your wishes written down  Spoken wishes can be misunderstood, or not followed  Treatments may be given even if you do not want them  An advance directive may make it easier for your family to make difficult choices about your care  Weight Management   Why it is important to manage your weight:  Being overweight increases your risk of health conditions such as heart disease, high blood pressure, type 2 diabetes, and certain types of cancer  It can also increase your risk for osteoarthritis, sleep apnea, and other respiratory problems  Aim for a slow, steady weight loss  Even a small amount of weight loss can lower your risk of health problems  How to lose weight safely:  A safe and healthy way to lose weight is to eat fewer calories and get regular exercise  You can lose up about 1 pound a week by decreasing the number of calories you eat by 500 calories each day  Healthy meal plan for weight management:  A healthy meal plan includes a variety of foods, contains fewer calories, and helps you stay healthy  A healthy meal plan includes the following:  · Eat whole-grain foods more often  A healthy meal plan should contain fiber  Fiber is the part of grains, fruits, and vegetables that is not broken down by your body  Whole-grain foods are healthy and provide extra fiber in your diet  Some examples of whole-grain foods are whole-wheat breads and pastas, oatmeal, brown rice, and bulgur  · Eat a variety of vegetables every day  Include dark, leafy greens such as spinach, kale, bang greens, and mustard greens  Eat yellow and orange vegetables such as carrots, sweet potatoes, and winter squash  · Eat a variety of fruits every day    Choose fresh or canned fruit (canned in its own juice or light syrup) instead of juice  Fruit juice has very little or no fiber  · Eat low-fat dairy foods  Drink fat-free (skim) milk or 1% milk  Eat fat-free yogurt and low-fat cottage cheese  Try low-fat cheeses such as mozzarella and other reduced-fat cheeses  · Choose meat and other protein foods that are low in fat  Choose beans or other legumes such as split peas or lentils  Choose fish, skinless poultry (chicken or turkey), or lean cuts of red meat (beef or pork)  Before you cook meat or poultry, cut off any visible fat  · Use less fat and oil  Try baking foods instead of frying them  Add less fat, such as margarine, sour cream, regular salad dressing and mayonnaise to foods  Eat fewer high-fat foods  Some examples of high-fat foods include french fries, doughnuts, ice cream, and cakes  · Eat fewer sweets  Limit foods and drinks that are high in sugar  This includes candy, cookies, regular soda, and sweetened drinks  Exercise:  Exercise at least 30 minutes per day on most days of the week  Some examples of exercise include walking, biking, dancing, and swimming  You can also fit in more physical activity by taking the stairs instead of the elevator or parking farther away from stores  Ask your healthcare provider about the best exercise plan for you  © Copyright [employer] 2018 Information is for End User's use only and may not be sold, redistributed or otherwise used for commercial purposes   All illustrations and images included in CareNotes® are the copyrighted property of [employer]  or [address] [place] [address]

## 2021-03-04 NOTE — PROGRESS NOTES
This note was not shared with the patient due to patient requested  Diabetic Foot Exam    Patient's shoes and socks removed  Right Foot/Ankle   Right Foot Inspection  Skin Exam: skin normal, skin intact and dry skin no warmth, no callus, no erythema, no maceration, no abnormal color, no pre-ulcer, no ulcer and no callus                          Toe Exam: ROM and strength within normal limits  Sensory       Monofilament testing: intact    Right Toe  - Comprehensive Exam  Ecchymosis: none  Arch: normal  Hammertoes: absent  Claw Toes: absent  Swelling: none   Tenderness: none         Left Foot/Ankle  Left Foot Inspection  Skin Exam: skin normal, skin intact and dry skinno warmth, no erythema, no maceration, normal color, no pre-ulcer, no ulcer and no callus                         Toe Exam: ROM and strength within normal limits                   Sensory       Monofilament: intact    Left Toe  - Comprehensive Exam  Ecchymosis: none  Arch: normal  Hammertoes: absent  Claw toes: absent  Swelling: none   Tenderness: none       Assign Risk Category:  No deformity present; No loss of protective sensation;  No weak pulses       Risk: 0

## 2021-03-04 NOTE — PROGRESS NOTES
Assessment and Plan:     Problem List Items Addressed This Visit        Endocrine    Diabetes mellitus ([facility] )    Relevant Medications    metFORMIN (GLUCOPHAGE) 1000 MG tablet    Other Relevant Orders    CBC and differential    Comprehensive metabolic panel    Hemoglobin A1C    UA w Reflex to Microscopic w Reflex to Culture    POCT hemoglobin A1c (Completed)    Diabetic foot exam    Other specified hypothyroidism    Relevant Orders    TSH, 3rd generation with Free T4 reflex       Cardiovascular and Mediastinum    CVA (cerebral vascular accident) ([facility] ) - Primary (Chronic)    Relevant Orders    Walker       Other    Vitamin D deficiency    Relevant Orders    Vitamin D 25 hydroxy    Elevated cholesterol with elevated triglycerides    Relevant Orders    Lipid panel        BMI Counseling: Body mass index is 29 73 kg/m²  The BMI is above normal  Nutrition recommendations include decreasing portion sizes, encouraging healthy choices of fruits and vegetables, decreasing fast food intake, consuming healthier snacks, limiting drinks that contain sugar, moderation in carbohydrate intake, increasing intake of lean protein, reducing intake of saturated and trans fat and reducing intake of cholesterol  Exercise recommendations include vigorous physical activity 75 minutes/week, exercising 3-5 times per week, obtaining a gym membership and strength training exercises  No pharmacotherapy was ordered  Patient referred to nutritionist due to patient being overweight  Depression Screening and Follow-up Plan: Patient's depression screening was positive with a PHQ-2 score of 2  Patient assessed for underlying major depression  Brief counseling provided and recommend additional follow-up/re-evaluation next office visit  Falls Plan of Care: balance, strength, and gait training instructions were provided  Recommended assistive device to help with gait and balance  Medications that increase falls were reviewed   Vitamin D supplementation was recommended  Home safety education provided  Preventive health issues were discussed with patient, and age appropriate screening tests were ordered as noted in patient's After Visit Summary  Personalized health advice and appropriate referrals for health education or preventive services given if needed, as noted in patient's After Visit Summary  History of Present Illness:     Patient presents for Welcome to Medicare visit  Patient Care Team:  Yuridia Burton as PCP - General (Nurse Practitioner)  Sharri Desai as [facility] (Care Coordination)     Review of Systems:     Review of Systems   Constitutional: Negative for activity change, appetite change, chills, fatigue, fever and unexpected weight change  HENT: Negative for congestion, ear discharge, ear pain, nosebleeds, postnasal drip, rhinorrhea, sinus pressure, sinus pain, sneezing, sore throat, tinnitus, trouble swallowing and voice change  Eyes: Negative for pain, redness and visual disturbance  Respiratory: Negative for apnea, cough, chest tightness, shortness of breath and wheezing  Cardiovascular: Negative for chest pain and palpitations  Gastrointestinal: Negative for abdominal distention, abdominal pain, constipation, diarrhea, nausea and vomiting  Endocrine: Negative  Genitourinary: Negative for difficulty urinating, dyspareunia, dysuria and hematuria  Musculoskeletal: Negative for arthralgias  Skin: Negative  Allergic/Immunologic: Negative  Neurological: Negative  Hematological: Negative  Psychiatric/Behavioral: Negative         Problem List:     Patient Active Problem List   Diagnosis    Essential hypertension    Mixed hyperlipidemia    Type II or unspecified type diabetes mellitus without mention of complication, not stated as uncontrolled    Allergic conjunctivitis of both eyes    Allergic rhinitis    Vasomotor rhinitis    Nonintractable headache    Laryngopharyngeal reflux (LPR)    Snoring    Intrinsic atopic dermatitis    UTI (urinary tract infection)    History of recurrent UTI (urinary tract infection)    Diabetes mellitus (HCC)    BMI 29 0-29 9,adult    Type 2 diabetes mellitus without complications (HCC)    Gastroesophageal reflux disease    Other specified hypothyroidism    Allergic conjunctivitis of left eye    Vitamin D deficiency    CVA (cerebral vascular accident) ([facility] )    Paroxysmal atrial fibrillation ([facility] )    Abnormal brain MRI    Screening for deficiency anemia    Elevated cholesterol with elevated triglycerides      Past Medical and Surgical History:     Past Medical History:   Diagnosis Date    Arthritis     Diabetes ([facility] )     Type 2    Glaucoma     Headache     Heartburn     Hypercholesteremia     Hypertension     Hypothyroidism     Migraines     Night sweats     Weight gain      Past Surgical History:   Procedure Laterality Date    CHOLECYSTECTOMY      TUBAL LIGATION      TUMOR REMOVAL Left     WISDOM TOOTH EXTRACTION        Family History:     Family History   Problem Relation Age of Onset    Other Mother         natural causes     Rheumatic fever Father     No Known Problems Sister     Other Brother         natural causes     No Known Problems Sister     Other Brother         natural causes     Diabetes Daughter     Allergies Daughter     No Known Problems Daughter     No Known Problems Daughter     No Known Problems Daughter     No Known Problems Son       Social History:        Social History     Socioeconomic History    Marital status:       Spouse name: Jack Maciel Number of children: 11    Years of education: None    Highest education level: None   Occupational History    Occupation:       Comment: Retired    Social Needs    Financial resource strain: Somewhat hard   Vasquez-Frank insecurity     Worry: Sometimes true     Inability: Sometimes true   [employer] needs Medical: No     Non-medical: No   Tobacco Use    Smoking status: Never Smoker    Smokeless tobacco: Never Used   Substance and Sexual Activity    Alcohol use: Not Currently    Drug use: Never    Sexual activity: Not Currently   Lifestyle    Physical activity     Days per week: 7 days     Minutes per session: 20 min    Stress: To some extent   Relationships    Social connections     Talks on phone: More than three times a week     Gets together: Never     Attends Faith service: Never     Active member of club or organization: No     Attends meetings of clubs or organizations: Never     Relationship status:      Intimate partner violence     Fear of current or ex partner: No     Emotionally abused: No     Physically abused: No     Forced sexual activity: No   Other Topics Concern    None   Social History Narrative    Patient lives in an apartment     No basement     No dehumidifier     No air      No humidifier     Hardwood cherry in the bedroom     No air conditioning     Home is smoke free         No pets         Caffeine: 1 cup of coffee daily                     Hot tea occasionally     Chocolate consumed occasionally         Patient lives a lone       Medications and Allergies:     Current Outpatient Medications   Medication Sig Dispense Refill    amLODIPine (NORVASC) 10 mg tablet Take 1 tablet (10 mg total) by mouth daily 90 tablet 3    apixaban (ELIQUIS) 5 mg Take 1 tablet (5 mg total) by mouth 2 (two) times a day 180 tablet 3    aspirin 81 mg chewable tablet Chew 1 tablet (81 mg total) daily 90 tablet 1    atorvastatin (LIPITOR) 40 mg tablet Take 1 tablet (40 mg total) by mouth every evening 90 tablet 1    ergocalciferol (VITAMIN D2) 50,000 units Take 1 capsule (50,000 Units total) by mouth once a week 12 capsule 2    Fluad Quadrivalent 0 5 ML PRS PHARMACY ADMINISTERED      FREESTYLE LITE test strip 100 each by Other route 3 (three) times a day Test 100 each 11    glimepiride (AMARYL) 4 mg tablet Take 1 tablet (4 mg total) by mouth 2 (two) times a day 180 tablet 3    Lancets (freestyle) lancets Use as instructed 100 each 11    levothyroxine 25 mcg tablet Take 1 tablet (25 mcg total) by mouth daily 90 tablet 3    nitrofurantoin (MACROBID) 100 mg capsule Take 100 mg by mouth 2 (two) times a day      olopatadine HCl (PATADAY) 0 2 % opth drops       pantoprazole (PROTONIX) 20 mg tablet Take 1 tablet (20 mg total) by mouth daily before breakfast 90 tablet 3    metFORMIN (GLUCOPHAGE) 1000 MG tablet Take 1 tablet (1,000 mg total) by mouth 2 (two) times a day with meals 60 tablet 2    Olopatadine HCl (Pazeo) 0 7 % SOLN Administer 1 drop into the left eye daily at bedtime (Patient not taking: Reported on 12/30/2020) 5 mL 1    Omega-3 Fatty Acids (fish oil) 1,000 mg Take 2 capsules (2,000 mg total) by mouth daily (Patient not taking: Reported on 12/30/2020) 180 capsule 3     No current facility-administered medications for this visit  No Known Allergies   Immunizations:     Immunization History   Administered Date(s) Administered    H1N1, All Formulations 01/07/2010    INFLUENZA 10/13/2004, 10/12/2007, 09/17/2009, 11/15/2011, 09/23/2017, 10/09/2018    Pneumococcal Polysaccharide PPV23 11/22/2011    influenza, trivalent, adjuvanted 09/24/2019      Health Maintenance: There are no preventive care reminders to display for this patient  Topic Date Due    Influenza Vaccine (1) 09/01/2020      Medicare Screening Tests and Risk Assessments:         Health Risk Assessment:   Patient rates overall health as good  Patient feels that their physical health rating is slightly worse  Eyesight was rated as same  Hearing was rated as slightly worse  Patient feels that their emotional and mental health rating is slightly worse  Pain experienced in the last 7 days has been some  Patient's pain rating has been 10/10   Patient states that she has experienced no weight loss or gain in last 6 months  Daughter reports dizziness, monthly intermittent headaches  Patient feels she needs something to "hold on to" while walking in order to stabilize herself  Depression Screening:   PHQ-2 Score: 2      PREVENTIVE SCREENINGS      Cardiovascular Screening:    General: Screening Not Indicated and History Lipid Disorder      Diabetes Screening:     General: Screening Not Indicated and History Diabetes      Colorectal Cancer Screening:     General: Screening Not Indicated      Cervical Cancer Screening:    General: Screening Not Indicated      Lung Cancer Screening:     General: Screening Not Indicated     Visual Acuity Screening    Right eye Left eye Both eyes   Without correction: 20/40 20/30 20/40   With correction:      Comments: Color vision= normal       Physical Exam:     /70   Pulse 62   Temp 97 9 °F (36 6 °C) (Tympanic)   Resp 12   Ht 4' 11" (1 499 m)   Wt 66 8 kg (147 lb 3 2 oz)   SpO2 96%   BMI 29 73 kg/m²     Physical Exam  Vitals signs and nursing note reviewed  Exam conducted with a chaperone present  Constitutional:       General: She is not in acute distress  Appearance: She is well-developed  HENT:      Head: Normocephalic and atraumatic  Right Ear: Tympanic membrane, ear canal and external ear normal       Left Ear: Tympanic membrane, ear canal and external ear normal       Nose: Nose normal       Mouth/Throat:      Mouth: Mucous membranes are moist    Eyes:      Extraocular Movements: Extraocular movements intact  Conjunctiva/sclera: Conjunctivae normal       Pupils: Pupils are equal, round, and reactive to light  Neck:      Musculoskeletal: Normal range of motion and neck supple  Cardiovascular:      Rate and Rhythm: Normal rate  Rhythm irregularly irregular  Pulses:           Radial pulses are 2+ on the right side and 2+ on the left side  Dorsalis pedis pulses are 1+ on the right side and 1+ on the left side        Heart sounds: Normal heart sounds  No murmur  Pulmonary:      Effort: Pulmonary effort is normal  No respiratory distress  Breath sounds: Normal breath sounds  No stridor  No wheezing, rhonchi or rales  Chest:      Chest wall: No tenderness  Abdominal:      General: Bowel sounds are normal       Palpations: Abdomen is soft  Tenderness: There is no abdominal tenderness  Musculoskeletal: Normal range of motion  General: No swelling, tenderness, deformity or signs of injury  Right lower le+ Edema present  Left lower le+ Pitting Edema present  Skin:     General: Skin is warm and dry  Capillary Refill: Capillary refill takes 2 to 3 seconds  Neurological:      General: No focal deficit present  Mental Status: She is alert and oriented to person, place, and time  Cranial Nerves: No cranial nerve deficit  Sensory: No sensory deficit  Motor: No weakness  Coordination: Coordination normal       Gait: Gait normal       Deep Tendon Reflexes: Reflexes normal       Comments: Mild left sided weakness  Mild hearing loss     Psychiatric:         Mood and Affect: Mood normal          Behavior: Behavior normal           YA Marques

## 2021-03-10 ENCOUNTER — IMMUNIZATIONS (OUTPATIENT)
Dept: FAMILY MEDICINE CLINIC | Facility: HOSPITAL | Age: 86
End: 2021-03-10

## 2021-03-10 DIAGNOSIS — Z23 ENCOUNTER FOR IMMUNIZATION: Primary | ICD-10-CM

## 2021-03-10 PROCEDURE — 0001A SARS-COV-2 / COVID-19 MRNA VACCINE (PFIZER-BIONTECH) 30 MCG: CPT

## 2021-03-10 PROCEDURE — 91300 SARS-COV-2 / COVID-19 MRNA VACCINE (PFIZER-BIONTECH) 30 MCG: CPT

## 2021-03-16 DIAGNOSIS — I63.9 CVA (CEREBRAL VASCULAR ACCIDENT) (HCC): Chronic | ICD-10-CM

## 2021-03-16 DIAGNOSIS — I48.0 PAROXYSMAL ATRIAL FIBRILLATION (HCC): ICD-10-CM

## 2021-03-16 RX ORDER — APIXABAN 5 MG/1
TABLET, FILM COATED ORAL
Qty: 60 TABLET | OUTPATIENT
Start: 2021-03-16

## 2021-03-17 NOTE — TELEPHONE ENCOUNTER
They gave her 3 month supply with 3 refills so she should not need it  I will call pharmacy to verify

## 2021-03-17 NOTE — TELEPHONE ENCOUNTER
Spoke with pharmacy and they said they are working on refill   Not needed from us here (already has refills)

## 2021-03-31 ENCOUNTER — IMMUNIZATIONS (OUTPATIENT)
Dept: FAMILY MEDICINE CLINIC | Facility: HOSPITAL | Age: 86
End: 2021-03-31

## 2021-03-31 DIAGNOSIS — Z23 ENCOUNTER FOR IMMUNIZATION: Primary | ICD-10-CM

## 2021-03-31 PROCEDURE — 0002A SARS-COV-2 / COVID-19 MRNA VACCINE (PFIZER-BIONTECH) 30 MCG: CPT

## 2021-03-31 PROCEDURE — 91300 SARS-COV-2 / COVID-19 MRNA VACCINE (PFIZER-BIONTECH) 30 MCG: CPT

## 2021-04-05 ENCOUNTER — TELEPHONE (OUTPATIENT)
Dept: FAMILY MEDICINE CLINIC | Facility: CLINIC | Age: 86
End: 2021-04-05

## 2021-04-05 NOTE — TELEPHONE ENCOUNTER
Pt's daughter called - Pt is at [facility] [place] - they are looking for information:  Pt Physical  Medical history  Med list  Facesheet faxed     Janelle Self Fax: [phone]

## 2021-05-27 ENCOUNTER — PATIENT OUTREACH (OUTPATIENT)
Dept: CASE MANAGEMENT | Facility: OTHER | Age: 86
End: 2021-05-27

## 2021-05-27 DIAGNOSIS — E11.9 TYPE 2 DIABETES MELLITUS WITHOUT COMPLICATION, WITHOUT LONG-TERM CURRENT USE OF INSULIN (HCC): ICD-10-CM

## 2021-05-27 NOTE — PROGRESS NOTES
1st Attempt     This CHW called patient this day in regards to Longitudinal Care Management call  No answer, Left voicemail with my name and phone number so patient can contact me   This CHW will reach out in three months

## 2021-06-07 ENCOUNTER — TELEPHONE (OUTPATIENT)
Dept: OTHER | Facility: OTHER | Age: 86
End: 2021-06-07

## 2021-06-08 NOTE — PROGRESS NOTES
BMI Counseling: Body mass index is 28 88 kg/m²  The BMI is above normal  Nutrition recommendations include decreasing portion sizes, encouraging healthy choices of fruits and vegetables, decreasing fast food intake, consuming healthier snacks, limiting drinks that contain sugar, moderation in carbohydrate intake, increasing intake of lean protein, reducing intake of saturated and trans fat and reducing intake of cholesterol  Exercise recommendations include vigorous physical activity 75 minutes/week, exercising 3-5 times per week, obtaining a gym membership and strength training exercises  No pharmacotherapy was ordered  Patient referred to nutritionist due to patient being overweight  Depression Screening and Follow-up Plan: Clincally patient does not have depression  No treatment is required  Falls Plan of Care: balance, strength, and gait training instructions were provided  Medications that increase falls were reviewed  Vitamin D supplementation was recommended  Assessment/Plan:         Problem List Items Addressed This Visit        Endocrine    Diabetes mellitus ([facility] )       Lab Results   Component Value Date    HGBA1C 10 1 (A) 03/04/2021   Patient's last hemoglobin A1c 10 1  Currently she has been on Glucophage  1000 mg p o  b i d  and Amaryl 4 mg 2 times daily for the last 3 months we will repeat the hemoglobin A1c here in the office  No complaints verbalized  Patient feels much better  Relevant Orders    Hemoglobin A1C ([facility])    POCT urine microalbumin/creatinine    POCT hemoglobin A1c    Ambulatory referral to Optometry       Cardiovascular and Mediastinum    CVA (cerebral vascular accident) ([facility] ) (Chronic)       Patient has no neurologic deficits  Currently on Eliquis 5 mg p o   2 times daily  She is currently managed by Cardiology              Nervous and Auditory    Excessive cerumen in ear canal, bilateral    Relevant Medications    carbamide peroxide (DEBROX) 6 5 % otic solution       Other    Body mass index (BMI) of 28 0 to 28 9 in adult       Patient's BMI is currently 28 88 kg/M2  Reduced from her last visit where she was greater than 29  Patient has been instructed on diet exercise and proper nutrition  Medicare annual wellness visit, initial - Primary            Subjective:      Patient ID: Isabelle Mccormack is a 80 y o  female  Patient is a 51-year-old female presents for follow-up evaluation of diabetes  Apparently she had been started on Glucophage 1000 mg p o  b i d  with Amaryl 4 mg p o  b i d     Currently we will re-evaluate her hemoglobin A1c  Last hemoglobin A1c 10 1  Patient indicates she feels great no side effects to report at this time  The following portions of the patient's history were reviewed and updated as appropriate:   Past Medical History:  She has a past medical history of Arthritis, Diabetes ([address] ), Glaucoma, Headache, Heartburn, Hypercholesteremia, Hypertension, Hypothyroidism, Migraines, Night sweats, and Weight gain  ,  _______________________________________________________________________  Medical Problems:  does not have any pertinent problems on file ,  _______________________________________________________________________  Past Surgical History:   has a past surgical history that includes Bassett tooth extraction; Tubal ligation; Cholecystectomy; and Tumor removal (Left)  ,  _______________________________________________________________________  Family History:  family history includes Allergies in her daughter; Diabetes in her daughter; No Known Problems in her daughter, daughter, daughter, sister, sister, and son; Other in her brother, brother, and mother; Rheumatic fever in her father ,  _______________________________________________________________________  Social History:   reports that she has never smoked  She has never used smokeless tobacco  She reports previous alcohol use   She reports that she does not use drugs ,  _______________________________________________________________________  Allergies:  has No Known Allergies     _______________________________________________________________________  Current Outpatient Medications   Medication Sig Dispense Refill    amLODIPine (NORVASC) 10 mg tablet Take 1 tablet (10 mg total) by mouth daily 90 tablet 3    apixaban (ELIQUIS) 5 mg Take 1 tablet (5 mg total) by mouth 2 (two) times a day 180 tablet 3    aspirin 81 mg chewable tablet Chew 1 tablet (81 mg total) daily 90 tablet 1    ergocalciferol (VITAMIN D2) 50,000 units Take 1 capsule (50,000 Units total) by mouth once a week 12 capsule 2    FREESTYLE LITE test strip 100 each by Other route 3 (three) times a day Test 100 each 11    glimepiride (AMARYL) 4 mg tablet Take 1 tablet (4 mg total) by mouth 2 (two) times a day 180 tablet 3    Lancets (freestyle) lancets Use as instructed 100 each 11    levothyroxine 25 mcg tablet Take 1 tablet (25 mcg total) by mouth daily 90 tablet 3    atorvastatin (LIPITOR) 40 mg tablet Take 1 tablet (40 mg total) by mouth every evening (Patient not taking: Reported on 6/9/2021) 90 tablet 1    carbamide peroxide (DEBROX) 6 5 % otic solution Administer 5 drops into both ears 2 (two) times a day 15 mL 0    Fluad Quadrivalent 0 5 ML PRSY PHARMACY ADMINISTERED      metFORMIN (GLUCOPHAGE) 1000 MG tablet TAKE 1 TABLET BY MOUTH TWICE A DAY WITH MEALS (Patient not taking: Reported on 6/9/2021) 180 tablet 2    nitrofurantoin (MACROBID) 100 mg capsule Take 100 mg by mouth 2 (two) times a day      olopatadine HCl (PATADAY) 0 2 % opth drops       Olopatadine HCl (Pazeo) 0 7 % SOLN Administer 1 drop into the left eye daily at bedtime (Patient not taking: Reported on 12/30/2020) 5 mL 1    Omega-3 Fatty Acids (fish oil) 1,000 mg Take 2 capsules (2,000 mg total) by mouth daily (Patient not taking: Reported on 12/30/2020) 180 capsule 3    pantoprazole (PROTONIX) 20 mg tablet Take 1 tablet (20 mg total) by mouth daily before breakfast (Patient not taking: Reported on 6/9/2021) 90 tablet 3     No current facility-administered medications for this visit       _______________________________________________________________________  Review of Systems   Constitutional: Negative for activity change, appetite change, chills, fatigue, fever and unexpected weight change  HENT: Negative for congestion, ear discharge, ear pain, nosebleeds, postnasal drip, rhinorrhea, sinus pressure, sinus pain, sneezing, sore throat and voice change  Eyes: Negative for pain, redness and visual disturbance  Respiratory: Negative for cough, chest tightness, shortness of breath and wheezing  Cardiovascular: Negative for chest pain and palpitations  Gastrointestinal: Negative for abdominal distention, abdominal pain, constipation, diarrhea, nausea, rectal pain and vomiting  Endocrine: Negative  Genitourinary: Negative for difficulty urinating, dysuria, flank pain, frequency, hematuria and urgency  Musculoskeletal: Negative for arthralgias and myalgias  Skin: Negative  Allergic/Immunologic: Negative  Neurological: Negative  Hematological: Negative  Psychiatric/Behavioral: Negative  Objective:  Vitals:    06/09/21 1526   BP: 152/78   Pulse: (!) 54   Resp: 16   Temp: 98 5 °F (36 9 °C)   TempSrc: Tympanic   SpO2: 96%   Weight: 64 9 kg (143 lb)   Height: 4' 11" (1 499 m)     Body mass index is 28 88 kg/m²  Physical Exam  Vitals signs and nursing note reviewed  Constitutional:       Appearance: Normal appearance  She is well-developed and normal weight  HENT:      Head: Normocephalic and atraumatic  Right Ear: Tympanic membrane, ear canal and external ear normal       Left Ear: Tympanic membrane, ear canal and external ear normal       Nose: Nose normal       Mouth/Throat:      Mouth: Mucous membranes are moist    Eyes:      Extraocular Movements: Extraocular movements intact  Conjunctiva/sclera: Conjunctivae normal       Pupils: Pupils are equal, round, and reactive to light  Neck:      Musculoskeletal: Normal range of motion  Cardiovascular:      Rate and Rhythm: Normal rate and regular rhythm  Pulses: Normal pulses  Heart sounds: Normal heart sounds  No murmur  Pulmonary:      Effort: Pulmonary effort is normal       Breath sounds: Normal breath sounds  Abdominal:      General: Bowel sounds are normal       Palpations: Abdomen is soft  Musculoskeletal: Normal range of motion  Skin:     General: Skin is warm  Capillary Refill: Capillary refill takes less than 2 seconds  Neurological:      General: No focal deficit present  Mental Status: She is alert and oriented to person, place, and time     Psychiatric:         Mood and Affect: Mood normal          Behavior: Behavior normal

## 2021-06-08 NOTE — PATIENT INSTRUCTIONS
Hemoglobin A1c   WHAT YOU NEED TO KNOW:   What is a hemoglobin A1c test, and why do I need one? A hemoglobin A1c is a blood test that measures your average blood sugar level for the past 2 to 3 months  It is also called an HbA1c or glycohemoglobin test  An A1c test can help diagnose prediabetes or diabetes  It can also tell you how well your diabetes plan is working  A1c testing can help your healthcare provider make changes to your treatment plan  These changes can help improve or control your blood sugar levels  Good control of your blood sugar levels can decrease your risk for problems caused by diabetes  Examples include heart attack, stroke, blindness, kidney disease, and neuropathy (nerve problems)  What increases my risk for diabetes? You may need an A1c test if you have any of the following risk factors for diabetes:  · Heart disease    · High blood pressure    · Polycystic ovarian syndrome    · A first-degree relative with diabetes, such as a parent, brother, sister, or child    · Being overweight    · Lack of exercise or activity     · History of gestational diabetes and poor nutrition while pregnant    Who should get an A1c test?   · Adults who are overweight and have 1 or more risk factors for diabetes    · Adults 39years of age or older    · Children 7 to 25 years who are overweight and have 2 or more risk factors for diabetes    · Anyone with signs or symptoms of diabetes, such as increased thirst, slow-healing infections, or increased urination    What do the results of an A1c test mean? The results are given in percentages  · If your healthcare provider is trying to diagnose diabetes:     ? An A1c of 5 6% or lower means you do not have diabetes  ? An A1c of 5 7% to 6 4% means you are at risk for diabetes  This is also called prediabetes  ? An A1c of 6 5% or higher means you have diabetes  · If you currently have diabetes, your A1c goal may be 8% or lower   Your healthcare provider will decide what your goal should be  This decision is based on your diabetes history and other medical conditions  You may need an A1c test 2 to 4 times each year, depending on your blood sugar level control  How should I prepare for the test?  You do not need to do anything to prepare for the test  Wear a short-sleeved or loose shirt to the test  This will make it easier to draw your blood  Other tests may be needed to diagnose or monitor diabetes if you have certain conditions  Tell your healthcare provider if you have any of the following:  · Iron-deficiency or [id number] anemia    · Cystic fibrosis    · Recent heavy blood loss or a blood transfusion    · Recent erythropoietin therapy    · Sickle cell disease or thalassemia    · Kidney failure or liver disease    What will happen after the test?  Schedule a follow-up appointment with your healthcare provider to talk about your test results  · If your A1c is lower than your goal , your medicines may be changed  · If your A1c is at your goal , you may not need any changes to your diabetes treatment plan  · If your A1c is higher than your goal , you may need changes to your medicines, eating plan, or exercise plan  What else should I know about an A1c test?   · You may get an estimated Average Glucose (eAG) with your A1c results  The eAG gives your A1c result in numbers like you see on your glucose meter  For example, an A1c of 6% will be reported as an eAG of 126 mg/dL  This means your average blood sugar level was 126 mg/dL over the last 2 to 3 months  The eAG can help you understand if your A1c result is on target for what your healthcare provider recommends  · You are at risk for an uncommon type of hemoglobin if you are , Mediterranean, or [employer]  This type of hemoglobin can affect your A1c test results  Tell your healthcare provider if you come from any of these backgrounds   You may need to have your A1c sent to a lab with certain equipment  This will help make sure your results are accurate  CARE AGREEMENT:   You have the right to help plan your care  To help with this plan, you must learn about your lab tests  You can then discuss the results with your healthcare providers  Work with them to decide what care may be used to treat you  You always have the right to refuse treatment  The above information is an  only  It is not intended as medical advice for individual conditions or treatments  Talk to your doctor, nurse or pharmacist before following any medical regimen to see if it is safe and effective for you  © Copyright [address] Information is for End User's use only and may not be sold, redistributed or otherwise used for commercial purposes  All illustrations and images included in CareNotes® are the copyrighted property of A D A M , Inc  or [facility] Tio Foster   Diabetes and Nutrition   WHAT [place]:   Why are nutrition plans important? Nutrition plans help with healthy eating patterns that improve health  Nutrition plans and regular exercise help keep your blood sugar levels steady  They also help delay or prevent complications of diabetes, such as diabetic kidney disease  How do I create a nutrition plan? A dietitian will help you create a nutrition plan to meet your needs and your family's needs  The goal is for you to reach or maintain healthy weight, blood sugar, blood pressure, and lipid levels  You should meet with the dietitian at least 1 time each year   You will learn the following:  · How food affects your blood sugar levels    · How to create healthy eating habits    · How to make food choices based on your activity level, weight, and glucose levels    · How your favorite foods may fit into your plan    · Foods that contain carbohydrates (sugars and starches), including simple and complex carbohydrates    · How to keep track of all carbohydrates    · Correct portion sizes for each food    · Changes you can make to your plan if you get pregnant or are breastfeeding    What are some tips to do until I meet with the dietitian? · Do not skip meals  The goal is to keep your blood sugar level steady  Blood sugar levels may drop too low if you have received insulin and do not eat  · Eat more high-fiber foods, such as fresh or frozen fruits and vegetables, whole-grain breads, and beans  Fiber helps control or lower blood sugar and cholesterol levels  Choose whole fruits instead of fruit juice as much as possible  Sugar may be added to juice, and fiber may be removed  · Choose heart-healthy fats  Foods high in heart-healthy fats include olive oil, nuts, avocados, and fatty fish, such as salmon and tuna  Foods high in unhealthy fats include red meat, full-fat dairy products, and soft margarine  Unhealthy fats can increase your risk for heart disease, increase bad cholesterol, and lower good cholesterol  · Choose complex carbohydrates  Foods with complex carbohydrates include brown rice, whole-grain breads and cereals, and cooked beans  Foods with simple carbohydrates include white bread, white rice, most cold cereals, and snack foods  Your plan will include the amount of carbohydrate to have at one time or in a day  Your blood sugar level can get too high if you eat too much carbohydrate at one time  Blood sugar levels do not spike as high or drop as quickly with complex carbohydrates as with simple carbohydrates  Choose complex carbohydrates whenever possible  · Have less sodium (salt)  The risk for high blood pressure (BP) increases with high-sodium foods  Limit high-sodium foods, such as soy sauce, potato chips, and canned soup  Do not add salt to food you cook  Limit your use of table salt  Read labels to have no more than 2,300 milligrams of sodium in one day  · Limit artificial sweeteners    These may be found in food or drinks, such as diet soft drinks or other low-calorie beverages  Artificial sweeteners are low in calories  They may help you lower your overall calories and carbohydrates  It is important not to have more calories from other foods to make up for the calories saved  Artificial sweeteners do not have any nutrition  Eat whole foods and drink water as much as possible  Your plan may include beverages with artificial sweeteners for a short time  These can help you transition from high-sugar beverages to water  · Use the plate method for each meal   This method can help you eat the right amount of carbohydrates and keep your blood sugar levels under control  ? Draw an imaginary line down the middle of a 9-inch dinner plate  On one side, draw another line to divide that section in half  Your plate will have one large section and 2 small sections  ? Fill the largest section with non-starchy vegetables  These include broccoli, spinach, cucumbers, peppers, cauliflower, and tomatoes  ? Add a starch to one of the small sections  Starches include pasta, rice, whole-grain bread, tortillas, corn, potatoes, and beans  ? Add meat or another source of protein to the other small section  Examples include chicken or turkey without skin, fish, lean beef or pork, low-fat cheese, tofu, and eggs  ? Add dairy products or fruit next to your plate if your meal plan allows  Examples of dairy include skim or 1% milk and low-fat yogurt  If you do not drink milk or eat dairy products, you may be able to add another serving of starchy food instead  ? Have a low-calorie or calorie-free drink with your meal  Examples include water or unsweetened tea or coffee  What are the risks of alcohol? Alcohol can cause hypoglycemia (very low blood sugar level), especially if you use insulin  Alcohol can cause high blood sugar and BP levels, and weight gain if you drink too much  Women 21 years or older and men 72 years or older should limit alcohol to 1 drink a day  Men aged 24 to 59 years should limit alcohol to 2 drinks a day  A drink of alcohol is 12 ounces of beer, 5 ounces of wine, or 1½ ounces of liquor  Hypoglycemia can happen hours after you drink alcohol  Check your blood sugar level for several hours after you drink alcohol  Have a source of fast-acting carbohydrates with you in case your level goes too low  You need immediate care if you have signs or symptoms of hypoglycemia, such as sweating, confusion, or fainting  Why is it important to maintain a healthy weight? A healthy weight can help you control your diabetes  You can maintain a healthy weight with a nutrition plan and exercise  Ask your healthcare provider how much you should weigh  Ask him or her to help you create a weight loss plan if you are overweight  Together you can set weight loss and maintenance goals  Call your local emergency number [phone] in the [facility]) if:   · You have any of the following signs of a heart attack:      ? Squeezing, pressure, or pain in your chest    ? You may  also have any of the following:     § Discomfort or pain in your back, neck, jaw, stomach, or arm    § Shortness of breath    § Nausea or vomiting    § Lightheadedness or a sudden cold sweat      When should I seek immediate care? · You have a low blood sugar level and it does not improve with treatment  Symptoms are trouble thinking, a pounding heartbeat, and sweating  · Your blood sugar level is above 240 mg/dL and does not come down within 15 minutes of treatment  · You have ketones in your blood or urine  · You have nausea or are vomiting and cannot keep any food or liquid down  · You have blurred or double vision  · Your breath has a fruity, sweet smell, or your breathing is shallow  When should I call my doctor or diabetes care team?   · Your blood sugar levels are higher than your target goals  · You often have low blood sugar levels      · You have trouble coping with diabetes, or you feel anxious or depressed  · You have questions or concerns about your condition or care  CARE AGREEMENT:   You have the right to help plan your care  Learn about your health condition and how it may be treated  Discuss treatment options with your healthcare providers to decide what care you want to receive  You always have the right to refuse treatment  The above information is an  only  It is not intended as medical advice for individual conditions or treatments  Talk to your doctor, nurse or pharmacist before following any medical regimen to see if it is safe and effective for you  © Copyright [address] Information is for End User's use only and may not be sold, redistributed or otherwise used for commercial purposes  All illustrations and images included in CareNotes® are the copyrighted property of A D A M , Inc  or [facility] Tio Foster   Diabetes and Exercise   WHAT [place]:   How will exercise help me manage diabetes? Physical activity, such as exercise, can help keep your blood sugar level steady or improve insulin resistance  Activity can help decrease your risk for heart disease, and help you lose weight  Exercise can also help lower your A1c  Your diabetes care team will help you create an exercise plan  The plan will be based on the type of diabetes you have and your starting fitness level  What are some tips to help me create and meet my exercise goals? · Set a goal for 150 minutes (2 5 hours) of moderate to vigorous aerobic activity each week  Aerobic activity helps your heart stay strong  Aerobic activity includes walking, bicycling, dancing, swimming, and raking leaves  Spread aerobic activity over 3 to 5 days  Do not take more than 2 days off in a row  It is best to do at least 10 minutes at a time and 30 minutes each day  You can work up to these goals  Remember that any activity is better than no activity   Over time, you can make exercise more intense or last longer  You can also add more days of exercise as your fitness level improves  Your diabetes care team can help you make a step-by-step plan to achieve your goals  · Set a strength training goal of 2 to 3 times a week  Take at least 1 day off in between strength training sessions  Strength training helps you keep the muscles you have and build new muscles  Strength training includes lifting weights, climbing stairs, yoga, and north chi     · Older adults should include balance training 2 to 3 times each week  These include walking backwards, standing on one foot, and walking heel to toe in a straight line  What are some other healthy exercise tips? · Stretch before and after you exercise to prevent injury  · Drink water or liquids that do not contain sugar before, during, and after exercise  Ask your dietitian or healthcare provider which liquids you should drink when you exercise  · Do not sit for longer than 30 minutes at a time during your day  If you cannot walk around, at least stand up  This will help you stay active and keep your blood circulating  What do I need to know about exercise and my blood sugar levels? Check your blood sugar level before and after exercise, if you use insulin  Healthcare providers may tell you to change the amount of insulin you take or food you eat  · If your blood sugar level is high, check your blood or urine for ketones before you exercise  Do not exercise if your blood sugar level is high and you have ketones  · If your blood sugar level is less than 100 mg/dL, have a carbohydrate snack before you exercise  Examples are 4 to 6 crackers, ½ banana, 8 ounces (1 cup) of milk, or 4 ounces (½ cup) of juice  Call your local emergency number (911 in the [address] [place] [address],[address]) if:   · You have chest pain or shortness of breath  When should I seek immediate care? · You have a low blood sugar level and it does not improve with treatment   Symptoms are trouble thinking, a pounding heartbeat, and sweating  · Your blood sugar level is above 240 mg/dL and does not come down within 15 minutes of treatment  · You have blurred or double vision  · Your breath has a fruity, sweet smell, or your breathing is shallow  When should I call my doctor or diabetes care team?   · You have ketones in your blood or urine  · You have a fever  · Your blood sugar levels are higher than your target goals  · You often have low blood sugar levels  · Your skin is red, dry, warm, or swollen  · You have a wound that does not heal     · You have trouble coping with diabetes, or you feel anxious or depressed  · You have questions or concerns about your condition or care  CARE AGREEMENT:   You have the right to help plan your care  Learn about your health condition and how it may be treated  Discuss treatment options with your healthcare providers to decide what care you want to receive  You always have the right to refuse treatment  The above information is an  only  It is not intended as medical advice for individual conditions or treatments  Talk to your doctor, nurse or pharmacist before following any medical regimen to see if it is safe and effective for you  © Copyright [address] Information is for End User's use only and may not be sold, redistributed or otherwise used for commercial purposes  All illustrations and images included in CareNotes® are the copyrighted property of [employer]  or [facility] Preventive Visit Patient Instructions  Thank you for completing your Welcome to Medicare Visit or Medicare Annual Wellness Visit today  Your next wellness visit will be due in one year (6/10/2022)  The screening/preventive services that you may require over the next 5-10 years are detailed below  Some tests may not apply to you based off risk factors and/or age   Screening tests ordered at today's visit but not completed yet may show as past due  Also, please note that scanned in results may not display below  Preventive Screenings:  Service Recommendations Previous Testing/Comments   Colorectal Cancer Screening  * Colonoscopy    * Fecal Occult Blood Test (FOBT)/Fecal Immunochemical Test (FIT)  * Fecal DNA/Cologuard Test  * Flexible Sigmoidoscopy Age: 54-65 years old   Colonoscopy: every 10 years (may be performed more frequently if at higher risk)  OR  FOBT/FIT: every 1 year  OR  Cologuard: every 3 years  OR  Sigmoidoscopy: every 5 years  Screening may be recommended earlier than age 48 if at higher risk for colorectal cancer  Also, an individualized decision between you and your healthcare provider will decide whether screening between the ages of 74-80 would be appropriate  Colonoscopy: Not on file  FOBT/FIT: Not on file  Cologuard: Not on file  Sigmoidoscopy: Not on file    Screening Not Indicated     Breast Cancer Screening Age: 36 years old  Frequency: every 1-2 years  Not required if history of left and right mastectomy Mammogram: Not on file    Screening Current   Cervical Cancer Screening Between the ages of 21-29, pap smear recommended once every 3 years  Between the ages of 33-67, can perform pap smear with HPV co-testing every 5 years  Recommendations may differ for women with a history of total hysterectomy, cervical cancer, or abnormal pap smears in past  Pap Smear: Not on file    Screening Not Indicated   Hepatitis C Screening Once for adults born between 1945 and 1965  More frequently in patients at high risk for Hepatitis C Hep C Antibody: Not on file    Risks and Benefits Discussed   Diabetes Screening 1-2 times per year if you're at risk for diabetes or have pre-diabetes Fasting glucose: 177 mg/dL   A1C: 10 2    Screening Not Indicated  History Diabetes   Cholesterol Screening Once every 5 years if you don't have a lipid disorder  May order more often based on risk factors   Lipid panel: 11/18/2020    Screening Not Indicated  History Lipid Disorder     Other Preventive Screenings Covered by Medicare:  1  Abdominal Aortic Aneurysm (AAA) Screening: covered once if your at risk  You're considered to be at risk if you have a family history of AAA  2  Lung Cancer Screening: covers low dose CT scan once per year if you meet all of the following conditions: (1) Age 50-69; (2) No signs or symptoms of lung cancer; (3) Current smoker or have quit smoking within the last 15 years; (4) You have a tobacco smoking history of at least 30 pack years (packs per day multiplied by number of years you smoked); (5) You get a written order from a healthcare provider  3  Glaucoma Screening: covered annually if you're considered high risk: (1) You have diabetes OR (2) Family history of glaucoma OR (3)  aged 48 and older OR (3)  American aged 72 and older  3  Osteoporosis Screening: covered every 2 years if you meet one of the following conditions: (1) You're estrogen deficient and at risk for osteoporosis based off medical history and other findings; (2) Have a vertebral abnormality; (3) On glucocorticoid therapy for more than 3 months; (4) Have primary hyperparathyroidism; (5) On osteoporosis medications and need to assess response to drug therapy  · Last bone density test (DXA Scan): Not on file  5  HIV Screening: covered annually if you're between the age of 12-76  Also covered annually if you are younger than 13 and older than 72 with risk factors for HIV infection  For pregnant patients, it is covered up to 3 times per pregnancy      Immunizations:  Immunization Recommendations   Influenza Vaccine Annual influenza vaccination during flu season is recommended for all persons aged >= 6 months who do not have contraindications   Pneumococcal Vaccine (Prevnar and Pneumovax)  * Prevnar = PCV13  * Pneumovax = PPSV23   Adults 25-60 years old: 1-3 doses may be recommended based on certain risk factors  Adults 72 years old: Prevnar (PCV13) vaccine recommended followed by Pneumovax (PPSV23) vaccine  If already received PPSV23 since turning 65, then PCV13 recommended at least one year after PPSV23 dose  Hepatitis B Vaccine 3 dose series if at intermediate or high risk (ex: diabetes, end stage renal disease, liver disease)   Tetanus (Td) Vaccine - COST NOT COVERED BY MEDICARE PART B Following completion of primary series, a booster dose should be given every 10 years to maintain immunity against tetanus  Td may also be given as tetanus wound prophylaxis  Tdap Vaccine - COST NOT COVERED BY MEDICARE PART B Recommended at least once for all adults  For pregnant patients, recommended with each pregnancy  Shingles Vaccine (Shingrix) - COST NOT COVERED BY MEDICARE PART B  2 shot series recommended in those aged 48 and above     Health Maintenance Due:  There are no preventive care reminders to display for this patient  Immunizations Due:      Topic Date Due    Influenza Vaccine (Season Ended) 09/01/2021     Advance Directives   What are advance directives? Advance directives are legal documents that state your wishes and plans for medical care  These plans are made ahead of time in case you lose your ability to make decisions for yourself  Advance directives can apply to any medical decision, such as the treatments you want, and if you want to donate organs  What are the types of advance directives? There are many types of advance directives, and each state has rules about how to use them  You may choose a combination of any of the following:  · Living will: This is a written record of the treatment you want  You can also choose which treatments you do not want, which to limit, and which to stop at a certain time  This includes surgery, medicine, IV fluid, and tube feedings  · Durable power of  for healthcare [facility]):   This is a written record that states who you want to make healthcare choices for you when you are unable to make them for yourself  This person, called a proxy, is usually a family member or a friend  You may choose more than 1 proxy  · Do not resuscitate (DNR) order:  A DNR order is used in case your heart stops beating or you stop breathing  It is a request not to have certain forms of treatment, such as CPR  A DNR order may be included in other types of advance directives  · Medical directive: This covers the care that you want if you are in a coma, near death, or unable to make decisions for yourself  You can list the treatments you want for each condition  Treatment may include pain medicine, surgery, blood transfusions, dialysis, IV or tube feedings, and a ventilator (breathing machine)  · Values history: This document has questions about your views, beliefs, and how you feel and think about life  This information can help others choose the care that you would choose  Why are advance directives important? An advance directive helps you control your care  Although spoken wishes may be used, it is better to have your wishes written down  Spoken wishes can be misunderstood, or not followed  Treatments may be given even if you do not want them  An advance directive may make it easier for your family to make difficult choices about your care  Weight Management   Why it is important to manage your weight:  Being overweight increases your risk of health conditions such as heart disease, high blood pressure, type 2 diabetes, and certain types of cancer  It can also increase your risk for osteoarthritis, sleep apnea, and other respiratory problems  Aim for a slow, steady weight loss  Even a small amount of weight loss can lower your risk of health problems  How to lose weight safely:  A safe and healthy way to lose weight is to eat fewer calories and get regular exercise   You can lose up about 1 pound a week by decreasing the number of calories you eat by 500 calories each day    Healthy meal plan for weight management:  A healthy meal plan includes a variety of foods, contains fewer calories, and helps you stay healthy  A healthy meal plan includes the following:  · Eat whole-grain foods more often  A healthy meal plan should contain fiber  Fiber is the part of grains, fruits, and vegetables that is not broken down by your body  Whole-grain foods are healthy and provide extra fiber in your diet  Some examples of whole-grain foods are whole-wheat breads and pastas, oatmeal, brown rice, and bulgur  · Eat a variety of vegetables every day  Include dark, leafy greens such as spinach, kale, bang greens, and mustard greens  Eat yellow and orange vegetables such as carrots, sweet potatoes, and winter squash  · Eat a variety of fruits every day  Choose fresh or canned fruit (canned in its own juice or light syrup) instead of juice  Fruit juice has very little or no fiber  · Eat low-fat dairy foods  Drink fat-free (skim) milk or 1% milk  Eat fat-free yogurt and low-fat cottage cheese  Try low-fat cheeses such as mozzarella and other reduced-fat cheeses  · Choose meat and other protein foods that are low in fat  Choose beans or other legumes such as split peas or lentils  Choose fish, skinless poultry (chicken or turkey), or lean cuts of red meat (beef or pork)  Before you cook meat or poultry, cut off any visible fat  · Use less fat and oil  Try baking foods instead of frying them  Add less fat, such as margarine, sour cream, regular salad dressing and mayonnaise to foods  Eat fewer high-fat foods  Some examples of high-fat foods include french fries, doughnuts, ice cream, and cakes  · Eat fewer sweets  Limit foods and drinks that are high in sugar  This includes candy, cookies, regular soda, and sweetened drinks  Exercise:  Exercise at least 30 minutes per day on most days of the week  Some examples of exercise include walking, biking, dancing, and swimming   You can also fit in more physical activity by taking the stairs instead of the elevator or parking farther away from stores  Ask your healthcare provider about the best exercise plan for you  © Copyright [employer] 2018 Information is for End User's use only and may not be sold, redistributed or otherwise used for commercial purposes   All illustrations and images included in CareNotes® are the copyrighted property of A D A M , Inc  or [address] [place] [address]

## 2021-06-09 ENCOUNTER — OFFICE VISIT (OUTPATIENT)
Dept: FAMILY MEDICINE CLINIC | Facility: CLINIC | Age: 86
End: 2021-06-09
Payer: MEDICARE

## 2021-06-09 VITALS
WEIGHT: 143 LBS | HEART RATE: 54 BPM | OXYGEN SATURATION: 96 % | SYSTOLIC BLOOD PRESSURE: 152 MMHG | RESPIRATION RATE: 16 BRPM | TEMPERATURE: 98.5 F | BODY MASS INDEX: 28.83 KG/M2 | HEIGHT: 59 IN | DIASTOLIC BLOOD PRESSURE: 78 MMHG

## 2021-06-09 DIAGNOSIS — I63.39 CEREBROVASCULAR ACCIDENT (CVA) DUE TO THROMBOSIS OF OTHER CEREBRAL ARTERY (HCC): Chronic | ICD-10-CM

## 2021-06-09 DIAGNOSIS — Z00.00 MEDICARE ANNUAL WELLNESS VISIT, INITIAL: Primary | ICD-10-CM

## 2021-06-09 DIAGNOSIS — H61.23 EXCESSIVE CERUMEN IN EAR CANAL, BILATERAL: ICD-10-CM

## 2021-06-09 DIAGNOSIS — E11.9 TYPE 2 DIABETES MELLITUS WITHOUT COMPLICATION, WITHOUT LONG-TERM CURRENT USE OF INSULIN (HCC): ICD-10-CM

## 2021-06-09 LAB — SL AMB POCT HEMOGLOBIN AIC: 10.2 (ref ?–6.5)

## 2021-06-09 PROCEDURE — G0438 PPPS, INITIAL VISIT: HCPCS | Performed by: NURSE PRACTITIONER

## 2021-06-09 PROCEDURE — 1123F ACP DISCUSS/DSCN MKR DOCD: CPT | Performed by: NURSE PRACTITIONER

## 2021-06-09 PROCEDURE — 99214 OFFICE O/P EST MOD 30 MIN: CPT | Performed by: NURSE PRACTITIONER

## 2021-06-09 PROCEDURE — 83036 HEMOGLOBIN GLYCOSYLATED A1C: CPT | Performed by: NURSE PRACTITIONER

## 2021-06-09 NOTE — ASSESSMENT & PLAN NOTE
Patient noted as some cerumen bilateral ears  Instructed to use Debrox drops routinely then scheduled for a follow-up appointment for flushing of both ears

## 2021-06-09 NOTE — ASSESSMENT & PLAN NOTE
Patient's blood pressure currently 152/78  Current blood pressure medications maintain of  Amlodipine 10 mg p o  daily  Patient has been instructed on a low-sodium diet  She has also been encouraged to do cardiovascular exercise  Due to 5 times per week for 60 minutes

## 2021-06-09 NOTE — ASSESSMENT & PLAN NOTE
Lab Results   Component Value Date    HGBA1C 10 1 (A) 03/04/2021   Patient's last hemoglobin A1c 10 1  Currently she has been on Glucophage  1000 mg p o  b i d  and Amaryl 4 mg 2 times daily for the last 3 months we will repeat the hemoglobin A1c here in the office  No complaints verbalized  Patient feels much better

## 2021-06-09 NOTE — ASSESSMENT & PLAN NOTE
Patient's BMI is currently 28 88 kg/M2  Reduced from her last visit where she was greater than 29  Patient has been instructed on diet exercise and proper nutrition

## 2021-06-09 NOTE — PROGRESS NOTES
Assessment and Plan:     Problem List Items Addressed This Visit        Endocrine    Diabetes mellitus ([facility] [address] )       Lab Results   Component Value Date    HGBA1C 10 1 (A) 03/04/2021   Patient's last hemoglobin A1c 10 1  Currently she has been on Glucophage  1000 mg p o  b i d  and Amaryl 4 mg 2 times daily for the last 3 months we will repeat the hemoglobin A1c here in the office  No complaints verbalized  Patient feels much better  Relevant Orders    Hemoglobin A1C ([facility])    POCT hemoglobin A1c (Completed)    Ambulatory referral to Optometry    Microalbumin / creatinine urine ratio       Cardiovascular and Mediastinum    CVA (cerebral vascular accident) ([facility] [address] ) (Chronic)       Patient has no neurologic deficits  Currently on Eliquis 5 mg p o   2 times daily  She is currently managed by Cardiology  Nervous and Auditory    Excessive cerumen in ear canal, bilateral    Relevant Medications    carbamide peroxide (DEBROX) 6 5 % otic solution       Other    Body mass index (BMI) of 28 0 to 28 9 in adult       Patient's BMI is currently 28 88 kg/M2  Reduced from her last visit where she was greater than 29  Patient has been instructed on diet exercise and proper nutrition  Medicare annual wellness visit, initial - Primary    Relevant Orders    Microalbumin / creatinine urine ratio           Preventive health issues were discussed with patient, and age appropriate screening tests were ordered as noted in patient's After Visit Summary  Personalized health advice and appropriate referrals for health education or preventive services given if needed, as noted in patient's After Visit Summary       History of Present Illness:     Patient presents for Medicare Annual Wellness visit    Patient Care Team:  Shola Trotter as PCP - General (Nurse Practitioner)  Marshal Holguin as [facility] Worker     Problem List:     Patient Active Problem List   Diagnosis    Essential hypertension    Mixed hyperlipidemia    Type II or unspecified type diabetes mellitus without mention of complication, not stated as uncontrolled    Allergic conjunctivitis of both eyes    Allergic rhinitis    Vasomotor rhinitis    Nonintractable headache    Laryngopharyngeal reflux (LPR)    Snoring    Intrinsic atopic dermatitis    UTI (urinary tract infection)    History of recurrent UTI (urinary tract infection)    Diabetes mellitus (HCC)    Body mass index (BMI) of 28 0 to 28 9 in adult    Type 2 diabetes mellitus without complications (HCC)    Gastroesophageal reflux disease    Other specified hypothyroidism    Allergic conjunctivitis of left eye    Vitamin D deficiency    [facility]ce[facility]) ([email or web address]    Paroxysmal atrial fibrillation ([email or web address]    Abnormal brain MRI    Medicare annual wellness visit, initial    Elevated cholesterol with elevated triglycerides    Excessive cerumen in ear canal, bilateral      Past Medical and Surgical History:     Past Medical History:   Diagnosis Date    Arthritis     Diabetes ([email or web address] )     Type 2    Glaucoma     Headache     Heartburn     Hypercholesteremia     Hypertension     Hypothyroidism     Migraines     Night sweats     Weight gain      Past Surgical History:   Procedure Laterality Date    CHOLECYSTECTOMY      TUBAL LIGATION      TUMOR REMOVAL Left     WISDOM TOOTH EXTRACTION        Family History:     Family History   Problem Relation Age of Onset    Other Mother         natural causes     Rheumatic fever Father     No Known Problems Sister     Other Brother         natural causes     No Known Problems Sister     Other Brother         natural causes     Diabetes Daughter     Allergies Daughter     No Known Problems Daughter     No Known Problems Daughter     No Known Problems Daughter     No Known Problems Son       Social History:        Social History     Socioeconomic History    Marital status:       Spouse name: Denzel Fenton Number of children: 11    Years of education: None    Highest education level: None   Occupational History    Occupation:       Comment: Retired    Social Needs    Financial resource strain: Somewhat hard   [address] insecurity     Worry: Sometimes true     Inability: Sometimes true    Transportation needs     Medical: No     Non-medical: No   Tobacco Use    Smoking status: Never Smoker    Smokeless tobacco: Never Used   Substance and Sexual Activity    Alcohol use: Not Currently    Drug use: Never    Sexual activity: Not Currently   Lifestyle    Physical activity     Days per week: 7 days     Minutes per session: 20 min    Stress: To some extent   Relationships    Social connections     Talks on phone: More than three times a week     Gets together: Never     Attends Sabianist service: Never     Active member of club or organization: No     Attends meetings of clubs or organizations: Never     Relationship status:      Intimate partner violence     Fear of current or ex partner: No     Emotionally abused: No     Physically abused: No     Forced sexual activity: No   Other Topics Concern    None   Social History Narrative    Patient lives in an apartment     No basement     No dehumidifier     No air      No humidifier     Hardwood cherry in the bedroom     No air conditioning     Home is smoke free         No pets         Caffeine: 1 cup of coffee daily                     Hot tea occasionally     Chocolate consumed occasionally         Patient lives a lone       Medications and Allergies:     Current Outpatient Medications   Medication Sig Dispense Refill    amLODIPine (NORVASC) 10 mg tablet Take 1 tablet (10 mg total) by mouth daily 90 tablet 3    apixaban (ELIQUIS) 5 mg Take 1 tablet (5 mg total) by mouth 2 (two) times a day 180 tablet 3    aspirin 81 mg chewable tablet Chew 1 tablet (81 mg total) daily 90 tablet 1    ergocalciferol (VITAMIN D2) 50,000 units Take 1 capsule (50,000 Units total) by mouth once a week 12 capsule 2    FREESTYLE LITE test strip 100 each by Other route 3 (three) times a day Test 100 each 11    glimepiride (AMARYL) 4 mg tablet Take 1 tablet (4 mg total) by mouth 2 (two) times a day 180 tablet 3    Lancets (freestyle) lancets Use as instructed 100 each 11    levothyroxine 25 mcg tablet Take 1 tablet (25 mcg total) by mouth daily 90 tablet 3    atorvastatin (LIPITOR) 40 mg tablet Take 1 tablet (40 mg total) by mouth every evening (Patient not taking: Reported on 6/9/2021) 90 tablet 1    carbamide peroxide (DEBROX) 6 5 % otic solution Administer 5 drops into both ears 2 (two) times a day 15 mL 0    Fluad Quadrivalent 0 5 ML PRSY PHARMACY ADMINISTERED      metFORMIN (GLUCOPHAGE) 1000 MG tablet TAKE 1 TABLET BY MOUTH TWICE A DAY WITH MEALS (Patient not taking: Reported on 6/9/2021) 180 tablet 2    nitrofurantoin (MACROBID) 100 mg capsule Take 100 mg by mouth 2 (two) times a day      olopatadine HCl (PATADAY) 0 2 % opth drops       Olopatadine HCl (Pazeo) 0 7 % SOLN Administer 1 drop into the left eye daily at bedtime (Patient not taking: Reported on 12/30/2020) 5 mL 1    Omega-3 Fatty Acids (fish oil) 1,000 mg Take 2 capsules (2,000 mg total) by mouth daily (Patient not taking: Reported on 12/30/2020) 180 capsule 3    pantoprazole (PROTONIX) 20 mg tablet Take 1 tablet (20 mg total) by mouth daily before breakfast (Patient not taking: Reported on 6/9/2021) 90 tablet 3     No current facility-administered medications for this visit        No Known Allergies   Immunizations:     Immunization History   Administered Date(s) Administered    H1N1, All Formulations 01/07/2010    INFLUENZA 10/13/2004, 10/12/2007, 09/17/2009, 11/15/2011, 09/23/2017, 10/09/2018    Pneumococcal Polysaccharide PPV23 11/22/2011    SARS-CoV-2 / COVID-19 mRNA IM (Pfizer-BioNTech) 03/10/2021, 03/31/2021    influenza, trivalent, adjuvanted 09/24/2019 Health Maintenance: There are no preventive care reminders to display for this patient  Topic Date Due    Influenza Vaccine (Season Ended) 09/01/2021      Medicare Health Risk Assessment:     /78   Pulse (!) 54   Temp 98 5 °F (36 9 °C) (Tympanic)   Resp 16   Ht 4' 11" (1 499 m)   Wt 64 9 kg (143 lb)   SpO2 96%   BMI 28 88 kg/m²      Adelita Desouza is here for her Initial Wellness visit  Health Risk Assessment:   Patient rates overall health as good  Patient feels that their physical health rating is slightly worse  Patient is satisfied with their life  Eyesight was rated as slightly worse  Hearing was rated as slightly worse  Patient feels that their emotional and mental health rating is same  Patients states they are never, rarely angry  Patient states they are often unusually tired/fatigued  Pain experienced in the last 7 days has been none  Patient states that she has experienced no weight loss or gain in last 6 months  Depression Screening:   PHQ-2 Score: 0      Fall Risk Screening: In the past year, patient has experienced: no history of falling in past year      Urinary Incontinence Screening:   Patient has not leaked urine accidently in the last six months  Home Safety:  Patient has trouble with stairs inside or outside of their home  Patient has working smoke alarms and has working carbon monoxide detector  Home safety hazards include: none  Nutrition:   Current diet is Diabetic and No Added Salt  Medications:   Patient is currently taking over-the-counter supplements  OTC medications include: see medication list  Patient is not able to manage medications  Daughter helps manage medications daily    Activities of Daily Living (ADLs)/Instrumental Activities of Daily Living (IADLs):   Walk and transfer into and out of bed and chair?: Yes  Dress and groom yourself?: Yes    Bathe or shower yourself?: Yes    Feed yourself?  Yes  Do your laundry/housekeeping?: No  Manage your money, pay your bills and track your expenses?: No  Make your own meals?: No    Do your own shopping?: No    ADL comments: Daughter helps with daily functions and ADL's  Advance Care Planning:   Living will: Yes    Durable POA for healthcare: Yes    Advanced directive: Yes    Advanced directive counseling given: Yes    Five wishes given: Yes    Patient declined ACP directive: No    End of Life Decisions reviewed with patient: Yes    Provider agrees with end of life decisions: Yes      Cognitive Screening:   Provider or family/friend/caregiver concerned regarding cognition?: No    PREVENTIVE SCREENINGS      Cardiovascular Screening:    General: Screening Not Indicated and History Lipid Disorder      Diabetes Screening:     General: Screening Not Indicated and History Diabetes      Colorectal Cancer Screening:     General: Screening Not Indicated      Breast Cancer Screening:     General: Screening Current      Cervical Cancer Screening:    General: Screening Not Indicated      Osteoporosis Screening:    General: Risks and Benefits Discussed      Abdominal Aortic Aneurysm (AAA) Screening:        General: Risks and Benefits Discussed      Lung Cancer Screening:     General: Screening Not Indicated      Hepatitis C Screening:    General: Risks and Benefits Discussed    Screening, Brief Intervention, and Referral to Treatment (SBIRT)    Screening  Typical number of drinks in a day: 0  Typical number of drinks in a week: 0  Interpretation: Low risk drinking behavior  Single Item Drug Screening:  How often have you used an illegal drug (including marijuana) or a prescription medication for non-medical reasons in the past year? never    Single Item Drug Screen Score: 0  Interpretation: Negative screen for possible drug use disorder    Brief Intervention  Alcohol & drug use screenings were reviewed  No concerns regarding substance use disorder identified       Other Counseling Topics:   Car/seat belt/driving safety, skin self-exam, sunscreen and calcium and vitamin D intake and regular weightbearing exercise         YA Montes De Oca

## 2021-06-09 NOTE — ASSESSMENT & PLAN NOTE
Patient has no neurologic deficits  Currently on Eliquis 5 mg p o   2 times daily  She is currently managed by Cardiology

## 2021-06-27 ENCOUNTER — HOSPITAL ENCOUNTER (INPATIENT)
Facility: HOSPITAL | Age: 86
LOS: 2 days | Discharge: NURSING, REHAB OR LONG-TERM CARE | DRG: 083 | End: 2021-06-30
Attending: SURGERY | Admitting: SURGERY
Payer: MEDICARE

## 2021-06-27 ENCOUNTER — APPOINTMENT (EMERGENCY)
Dept: RADIOLOGY | Facility: HOSPITAL | Age: 86
DRG: 083 | End: 2021-06-27
Payer: MEDICARE

## 2021-06-27 ENCOUNTER — APPOINTMENT (EMERGENCY)
Dept: CT IMAGING | Facility: HOSPITAL | Age: 86
DRG: 083 | End: 2021-06-27
Payer: MEDICARE

## 2021-06-27 DIAGNOSIS — G47.09 OTHER INSOMNIA: ICD-10-CM
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
## 2021-06-27 DIAGNOSIS — S06.5X9A SUBDURAL HEMATOMA (HCC): ICD-10-CM

## 2021-06-27 DIAGNOSIS — F32.0 CURRENT MILD EPISODE OF MAJOR DEPRESSIVE DISORDER, UNSPECIFIED WHETHER RECURRENT (HCC): ICD-10-CM

## 2021-06-27 PROBLEM — R55 SYNCOPE: Status: ACTIVE | Noted: 2021-06-27

## 2021-06-27 PROBLEM — Z86.79 HISTORY OF ATRIAL FIBRILLATION: Status: ACTIVE | Noted: 2021-06-27

## 2021-06-27 PROBLEM — Z86.39 HISTORY OF DIABETES MELLITUS: Status: ACTIVE | Noted: 2021-06-27

## 2021-06-27 PROBLEM — D69.1 PLATELET DYSFUNCTION (HCC): Status: ACTIVE | Noted: 2021-06-27

## 2021-06-27 PROBLEM — D68.9 COAGULOPATHY (HCC): Status: ACTIVE | Noted: 2021-06-27

## 2021-06-27 LAB
ALBUMIN SERPL BCP-MCNC: 3.9 G/DL (ref 3.5–5)
ALP SERPL-CCNC: 115 U/L (ref 46–116)
ALT SERPL W P-5'-P-CCNC: 30 U/L (ref 12–78)
ANION GAP SERPL CALCULATED.3IONS-SCNC: 13 MMOL/L (ref 4–13)
AST SERPL W P-5'-P-CCNC: 22 U/L (ref 5–45)
BASOPHILS # BLD AUTO: 0.05 THOUSANDS/ΜL (ref 0–0.1)
BASOPHILS NFR BLD AUTO: 1 % (ref 0–1)
BILIRUB SERPL-MCNC: 0.25 MG/DL (ref 0.2–1)
BUN SERPL-MCNC: 17 MG/DL (ref 5–25)
CALCIUM SERPL-MCNC: 9.6 MG/DL (ref 8.3–10.1)
CHLORIDE SERPL-SCNC: 104 MMOL/L (ref 100–108)
CO2 SERPL-SCNC: 21 MMOL/L (ref 21–32)
CREAT SERPL-MCNC: 0.82 MG/DL (ref 0.6–1.3)
EOSINOPHIL # BLD AUTO: 0.41 THOUSAND/ΜL (ref 0–0.61)
EOSINOPHIL NFR BLD AUTO: 5 % (ref 0–6)
ERYTHROCYTE [DISTWIDTH] IN BLOOD BY AUTOMATED COUNT: 12.2 % (ref 11.6–15.1)
GFR SERPL CREATININE-BSD FRML MDRD: 61 ML/MIN/1.73SQ M
GLUCOSE SERPL-MCNC: 263 MG/DL (ref 65–140)
GLUCOSE SERPL-MCNC: 271 MG/DL (ref 65–140)
GLUCOSE SERPL-MCNC: 310 MG/DL (ref 65–140)
HCT VFR BLD AUTO: 44.3 % (ref 34.8–46.1)
HCT VFR BLD CALC: 42 % (ref 34.8–46.1)
HGB BLD-MCNC: 14.9 G/DL (ref 11.5–15.4)
HGB BLDA-MCNC: 14.3 G/DL (ref 11.5–15.4)
IMM GRANULOCYTES # BLD AUTO: 0.02 THOUSAND/UL (ref 0–0.2)
IMM GRANULOCYTES NFR BLD AUTO: 0 % (ref 0–2)
INR PPP: 1.19 (ref 0.84–1.19)
LYMPHOCYTES # BLD AUTO: 2.3 THOUSANDS/ΜL (ref 0.6–4.47)
LYMPHOCYTES NFR BLD AUTO: 27 % (ref 14–44)
MAGNESIUM SERPL-MCNC: 1.8 MG/DL (ref 1.6–2.6)
MCH RBC QN AUTO: 30.8 PG (ref 26.8–34.3)
MCHC RBC AUTO-ENTMCNC: 33.6 G/DL (ref 31.4–37.4)
MCV RBC AUTO: 92 FL (ref 82–98)
MONOCYTES # BLD AUTO: 0.65 THOUSAND/ΜL (ref 0.17–1.22)
MONOCYTES NFR BLD AUTO: 8 % (ref 4–12)
NEUTROPHILS # BLD AUTO: 5.14 THOUSANDS/ΜL (ref 1.85–7.62)
NEUTS SEG NFR BLD AUTO: 59 % (ref 43–75)
NRBC BLD AUTO-RTO: 0 /100 WBCS
PHOSPHATE SERPL-MCNC: 2.9 MG/DL (ref 2.3–4.1)
PLATELET # BLD AUTO: 287 THOUSANDS/UL (ref 149–390)
PMV BLD AUTO: 9.7 FL (ref 8.9–12.7)
PO2 BLD: 53 MM HG (ref 35–45)
POTASSIUM BLD-SCNC: 3.9 MMOL/L (ref 3.5–5.3)
POTASSIUM SERPL-SCNC: 3.7 MMOL/L (ref 3.5–5.3)
PROT SERPL-MCNC: 7.8 G/DL (ref 6.4–8.2)
PROTHROMBIN TIME: 15.2 SECONDS (ref 11.6–14.5)
RBC # BLD AUTO: 4.84 MILLION/UL (ref 3.81–5.12)
SODIUM BLD-SCNC: 138 MMOL/L (ref 136–145)
SODIUM SERPL-SCNC: 138 MMOL/L (ref 136–145)
SPECIMEN SOURCE: ABNORMAL
TROPONIN I SERPL-MCNC: <0.02 NG/ML
TROPONIN I SERPL-MCNC: <0.02 NG/ML
WBC # BLD AUTO: 8.57 THOUSAND/UL (ref 4.31–10.16)

## 2021-06-27 PROCEDURE — NC001 PR NO CHARGE: Performed by: EMERGENCY MEDICINE

## 2021-06-27 PROCEDURE — 82948 REAGENT STRIP/BLOOD GLUCOSE: CPT

## 2021-06-27 PROCEDURE — 70486 CT MAXILLOFACIAL W/O DYE: CPT

## 2021-06-27 PROCEDURE — 80053 COMPREHEN METABOLIC PANEL: CPT | Performed by: PHYSICIAN ASSISTANT

## 2021-06-27 PROCEDURE — 71045 X-RAY EXAM CHEST 1 VIEW: CPT

## 2021-06-27 PROCEDURE — 36415 COLL VENOUS BLD VENIPUNCTURE: CPT | Performed by: PHYSICIAN ASSISTANT

## 2021-06-27 PROCEDURE — 72125 CT NECK SPINE W/O DYE: CPT

## 2021-06-27 PROCEDURE — 99285 EMERGENCY DEPT VISIT HI MDM: CPT

## 2021-06-27 PROCEDURE — 85014 HEMATOCRIT: CPT

## 2021-06-27 PROCEDURE — 93308 TTE F-UP OR LMTD: CPT | Performed by: SURGERY

## 2021-06-27 PROCEDURE — 85610 PROTHROMBIN TIME: CPT | Performed by: PHYSICIAN ASSISTANT

## 2021-06-27 PROCEDURE — 85025 COMPLETE CBC W/AUTO DIFF WBC: CPT | Performed by: PHYSICIAN ASSISTANT

## 2021-06-27 PROCEDURE — 93005 ELECTROCARDIOGRAM TRACING: CPT

## 2021-06-27 PROCEDURE — 84100 ASSAY OF PHOSPHORUS: CPT | Performed by: PHYSICIAN ASSISTANT

## 2021-06-27 PROCEDURE — 83735 ASSAY OF MAGNESIUM: CPT | Performed by: PHYSICIAN ASSISTANT

## 2021-06-27 PROCEDURE — 71260 CT THORAX DX C+: CPT

## 2021-06-27 PROCEDURE — 74177 CT ABD & PELVIS W/CONTRAST: CPT

## 2021-06-27 PROCEDURE — C9132 KCENTRA, PER I.U.: HCPCS | Performed by: PHYSICIAN ASSISTANT

## 2021-06-27 PROCEDURE — 99291 CRITICAL CARE FIRST HOUR: CPT | Performed by: SURGERY

## 2021-06-27 PROCEDURE — 84484 ASSAY OF TROPONIN QUANT: CPT | Performed by: PHYSICIAN ASSISTANT

## 2021-06-27 PROCEDURE — 84295 ASSAY OF SERUM SODIUM: CPT

## 2021-06-27 PROCEDURE — 99291 CRITICAL CARE FIRST HOUR: CPT | Performed by: NURSE PRACTITIONER

## 2021-06-27 PROCEDURE — 84132 ASSAY OF SERUM POTASSIUM: CPT

## 2021-06-27 PROCEDURE — 70450 CT HEAD/BRAIN W/O DYE: CPT

## 2021-06-27 PROCEDURE — 76705 ECHO EXAM OF ABDOMEN: CPT | Performed by: SURGERY

## 2021-06-27 PROCEDURE — 82947 ASSAY GLUCOSE BLOOD QUANT: CPT

## 2021-06-27 RX ORDER — HYDRALAZINE HYDROCHLORIDE 20 MG/ML
10 INJECTION INTRAMUSCULAR; INTRAVENOUS EVERY 6 HOURS PRN
Status: DISCONTINUED | OUTPATIENT
Start: 2021-06-27 | End: 2021-06-30 | Stop reason: HOSPADM

## 2021-06-27 RX ORDER — LABETALOL 20 MG/4 ML (5 MG/ML) INTRAVENOUS SYRINGE
10 EVERY 6 HOURS PRN
Status: DISCONTINUED | OUTPATIENT
Start: 2021-06-27 | End: 2021-06-27

## 2021-06-27 RX ORDER — SODIUM CHLORIDE, SODIUM GLUCONATE, SODIUM ACETATE, POTASSIUM CHLORIDE, MAGNESIUM CHLORIDE, SODIUM PHOSPHATE, DIBASIC, AND POTASSIUM PHOSPHATE .53; .5; .37; .037; .03; .012; .00082 G/100ML; G/100ML; G/100ML; G/100ML; G/100ML; G/100ML; G/100ML
50 INJECTION, SOLUTION INTRAVENOUS CONTINUOUS
Status: DISPENSED | OUTPATIENT
Start: 2021-06-27 | End: 2021-06-27

## 2021-06-27 RX ORDER — AMLODIPINE BESYLATE 10 MG/1
10 TABLET ORAL DAILY
Status: DISCONTINUED | OUTPATIENT
Start: 2021-06-28 | End: 2021-06-30 | Stop reason: HOSPADM

## 2021-06-27 RX ORDER — OXYCODONE HYDROCHLORIDE 5 MG/1
5 TABLET ORAL EVERY 4 HOURS PRN
Status: DISCONTINUED | OUTPATIENT
Start: 2021-06-27 | End: 2021-06-30 | Stop reason: HOSPADM

## 2021-06-27 RX ORDER — ONDANSETRON 2 MG/ML
4 INJECTION INTRAMUSCULAR; INTRAVENOUS EVERY 6 HOURS PRN
Status: DISCONTINUED | OUTPATIENT
Start: 2021-06-27 | End: 2021-06-30 | Stop reason: HOSPADM

## 2021-06-27 RX ORDER — HYDROMORPHONE HCL/PF 1 MG/ML
0.2 SYRINGE (ML) INJECTION EVERY 4 HOURS PRN
Status: DISCONTINUED | OUTPATIENT
Start: 2021-06-27 | End: 2021-06-30 | Stop reason: HOSPADM

## 2021-06-27 RX ORDER — ACETAMINOPHEN 325 MG/1
975 TABLET ORAL EVERY 8 HOURS SCHEDULED
Status: DISCONTINUED | OUTPATIENT
Start: 2021-06-27 | End: 2021-06-30 | Stop reason: HOSPADM

## 2021-06-27 RX ORDER — LEVOTHYROXINE SODIUM 0.03 MG/1
25 TABLET ORAL DAILY
Status: DISCONTINUED | OUTPATIENT
Start: 2021-06-28 | End: 2021-06-30 | Stop reason: HOSPADM

## 2021-06-27 RX ORDER — OXYCODONE HYDROCHLORIDE 5 MG/1
2.5 TABLET ORAL EVERY 4 HOURS PRN
Status: DISCONTINUED | OUTPATIENT
Start: 2021-06-27 | End: 2021-06-30 | Stop reason: HOSPADM

## 2021-06-27 RX ORDER — NITROFURANTOIN 25; 75 MG/1; MG/1
100 CAPSULE ORAL 2 TIMES DAILY
Status: DISCONTINUED | OUTPATIENT
Start: 2021-06-27 | End: 2021-06-29

## 2021-06-27 RX ADMIN — Medication 3000 UNITS: at 15:32

## 2021-06-27 RX ADMIN — DESMOPRESSIN ACETATE 19.6 MCG: 4 INJECTION INTRAVENOUS; SUBCUTANEOUS at 15:41

## 2021-06-27 RX ADMIN — IOHEXOL 100 ML: 350 INJECTION, SOLUTION INTRAVENOUS at 14:34

## 2021-06-27 RX ADMIN — LEVETIRACETAM 500 MG: 100 INJECTION, SOLUTION INTRAVENOUS at 16:14

## 2021-06-27 RX ADMIN — ONDANSETRON 4 MG: 2 INJECTION INTRAMUSCULAR; INTRAVENOUS at 17:05

## 2021-06-27 RX ADMIN — HYDRALAZINE HYDROCHLORIDE 10 MG: 20 INJECTION, SOLUTION INTRAMUSCULAR; INTRAVENOUS at 18:27

## 2021-06-27 RX ADMIN — HYDROMORPHONE HYDROCHLORIDE 0.2 MG: 1 INJECTION, SOLUTION INTRAMUSCULAR; INTRAVENOUS; SUBCUTANEOUS at 16:45

## 2021-06-27 RX ADMIN — INSULIN LISPRO 3 UNITS: 100 INJECTION, SOLUTION INTRAVENOUS; SUBCUTANEOUS at 19:13

## 2021-06-27 RX ADMIN — SODIUM CHLORIDE, SODIUM GLUCONATE, SODIUM ACETATE, POTASSIUM CHLORIDE, MAGNESIUM CHLORIDE, SODIUM PHOSPHATE, DIBASIC, AND POTASSIUM PHOSPHATE 50 ML/HR: .53; .5; .37; .037; .03; .012; .00082 INJECTION, SOLUTION INTRAVENOUS at 16:17

## 2021-06-27 NOTE — ASSESSMENT & PLAN NOTE
- patient reportedly became dizzy and then fell out of bed today after getting up from a nap    Daughter suspects this may be because she did not have her walker nearby however is concerned that something may have caused her to possibly pass out and fall   -will perform syncope workup with monitoring on telemetry, and checking orthostatic vital signs, EKG and troponins

## 2021-06-27 NOTE — ASSESSMENT & PLAN NOTE
- Neuro exam: GCS 15, non-focal  - Continue neurologic checks: Every 1 hours  - Reversal agent administered: K-Centra and DDAVP  - CT scan of the head on 6/27 reviewed:  Left parafalcine subdural hematoma  - follow-up Neurosurgery evaluation and recommendations  - Complete 7 day course of Keppra for seizure prophylaxis  - Chemical DVT prophylaxis: Not cleared for chemical prophylaxis by neurosurgery at this time  Continue SCDs bilaterally  - Hold all anticoagulants and anti platelet medications for 2 weeks and/or until cleared by Neurosurgery to resume   - PT and OT (including cognitive evaluation) evaluation and treatment as indicated

## 2021-06-27 NOTE — ASSESSMENT & PLAN NOTE
-possible syncopal fall as patient alludes to dizziness prior to falling  -sustaining a below stated injuries  -geriatrics consult placed  -PT and OT evaluations ordered

## 2021-06-27 NOTE — ED PROVIDER NOTES
Emergency Department Airway Evaluation and Management Form    History  Obtained from: patient  Patient has no known allergies  No chief complaint on file  HPI    Past Medical History:   Diagnosis Date    Arthritis     Diabetes ([address] )     Type 2    Glaucoma     Headache     Heartburn     Hypercholesteremia     Hypertension     Hypothyroidism     Migraines     Night sweats     Weight gain      Past Surgical History:   Procedure Laterality Date    CHOLECYSTECTOMY      TUBAL LIGATION      TUMOR REMOVAL Left     WISDOM TOOTH EXTRACTION       Family History   Problem Relation Age of Onset    Other Mother         natural causes     Rheumatic fever Father     No Known Problems Sister     Other Brother         natural causes     No Known Problems Sister     Other Brother         natural causes     Diabetes Daughter     Allergies Daughter     No Known Problems Daughter     No Known Problems Daughter     No Known Problems Daughter     No Known Problems Son      Social History     Tobacco Use    Smoking status: Never Smoker    Smokeless tobacco: Never Used   Vaping Use    Vaping Use: Never used   Substance Use Topics    Alcohol use: Not Currently    Drug use: Never     I have reviewed and agree with the history as documented  Review of Systems    Physical Exam  BP (!) 178/108   Pulse 69   Temp 97 8 °F (36 6 °C) (Tympanic)   Resp 16   SpO2 94%     Physical Exam    ED Medications  Medications - No data to display    Intubation  Procedures    Notes  79 y/o female presents for evaluation after falling out of bed last night/this morning  States she tried to get up and lost her balance, falling into a piece of furniture   + head strike  Denies LOC  Takes Eliquis and Aspirin  Airway is intact with no indication for airway intervention at this time  Care relinquished to the trauma team for management and disposition          Final Diagnosis  Final diagnoses:   None       ED Provider  Electronically Signed by     Wesley De La Vega DO  06/27/21 [id number]

## 2021-06-27 NOTE — PROGRESS NOTES
Trauma/Surgical Critical Care- Acceptance Note   Emir Yañez 80 y o  female MRN: [id number]  Unit/Bed#: ICU 08 Encounter: [id number]      Chief Complaint:      HPI:  HX and PE limited by:  Poor historian  Emir Yañez is a 80 y o  female with past medical history of PAF on Eliquis, hypertension, stroke with no residual, GERD, dm 2 who presents with fall from sitting to standing with dizziness  Per patient she has chronic dizziness at all times even during rest   She remembers sitting up in bed and after that waking up on the floor in which she called for her brother-in-law who lives with her  She is unclear what she hit her head on  EMS was called  On arrival to the ER  Trauma alert initiated  Pan scans were obtained revealing left forehead hematoma with calvarial fracture and small acute left subdural hematoma  Patient was given DDAVP and Kcentra for reversal of aspirin/Eliquis  Neurosurgery was consulted  Critical Care will follow in ICU with trauma primary        Assessment and Plan     Diagnosis:  Traumatic subdural hematoma/subcutaneous hematoma  o Plan:  Admitted to trauma, neurosurgery consultation, neuro checks q 1 hour, Keppra x7 days for C rib prophylaxis, patient given Kcentra/DDAVP to reverse Eliquis and aspirin, continue to hold anticoagulation until cleared by Neurosurgery, PT/OT, fall precautions, orthostatics     Diagnosis:  Fall presyncopal versus syncopal/headache/chronic dizziness  Plan:  Trauma pan scans are negative, PT/OT, gerontology consult, EKG/troponin pending, attempt to treat headache with nonnarcotic given Dilaudid with subsequent nausea/vomiting, repeat CT of the head with worsening of headache or change in mental status     Diagnosis:  Hypertension/history of AFib on Eliquis/history of stroke/GERD/dm 2  o Plan:  Continue home Norvasc/statin/Synthroid/PPI, holding Eliquis/aspirin in the setting of 1, sliding-scale insulin hold metformin and Amaryl    Code Status: Level 3 - DNAR and DNI  -----------------------------------------------------------------------------------------------------------  Vital Signs:   Vitals:    06/27/21 1420 06/27/21 1450 06/27/21 1622 06/27/21 1630   BP: (!) 195/76 (!) 185/81 142/61 144/65   Pulse: 68 64 65 68   Resp: 18 18 18 18   Temp:       TempSrc:       SpO2: 91% 92% 93% 92%       Physical Exam  Constitutional:       General: She is not in acute distress  HENT:      Head: Normocephalic  Mouth/Throat:      Mouth: Mucous membranes are moist    Eyes:      General: No scleral icterus  Pupils: Pupils are equal, round, and reactive to light  Comments: Pupils 2-3 and breast   Bilateral ecchymosis around the orbits, left upper eyelid edema, left forehead hematoma/ecchymosis   Cardiovascular:      Rate and Rhythm: Normal rate and regular rhythm  Pulses: Normal pulses  Heart sounds: Normal heart sounds  No murmur heard  Pulmonary:      Effort: Pulmonary effort is normal  No respiratory distress  Breath sounds: Normal breath sounds  Abdominal:      General: Bowel sounds are normal  There is no distension  Palpations: Abdomen is soft  Tenderness: There is no abdominal tenderness  Musculoskeletal:         General: Normal range of motion  Cervical back: Neck supple  Right lower leg: No edema  Left lower leg: No edema  Skin:     General: Skin is warm and dry  Capillary Refill: Capillary refill takes less than 2 seconds  Coloration: Skin is not pale  Findings: No erythema  Neurological:      General: No focal deficit present  Mental Status: She is alert and oriented to person, place, and time  GCS: GCS eye subscore is 4  GCS verbal subscore is 5  GCS motor subscore is 6  Cranial Nerves: No cranial nerve deficit     Psychiatric:         Mood and Affect: Mood normal            Laboratory   Results from last 7 days   Lab Units 06/27/21  1558 06/27/21  1410   WBC Thousand/uL 8 57 --    HEMOGLOBIN g/dL 14 9  --    I STAT HEMOGLOBIN g/dl  --  14 3   HEMATOCRIT % 44 3  --    HEMATOCRIT, ISTAT %  --  42   PLATELETS Thousands/uL 287  --    NEUTROS PCT % 59  --    MONOS PCT % 8  --      Results from last 7 days   Lab Units 21  1558   SODIUM mmol/L 138   POTASSIUM mmol/L 3 7   CHLORIDE mmol/L 104   CO2 mmol/L 21   ANION GAP mmol/L 13   BUN mg/dL 17   CREATININE mg/dL 0 82   CALCIUM mg/dL 9 6   GLUCOSE RANDOM mg/dL 263*   ALT U/L 30   AST U/L 22   ALK PHOS U/L 115   ALBUMIN g/dL 3 9   TOTAL BILIRUBIN mg/dL 0 25     Results from last 7 days   Lab Units 21  1558   MAGNESIUM mg/dL 1 8   PHOSPHORUS mg/dL 2 9                   ABG:    VBG:            Diagnostic Imaging / Data: I have personally reviewed pertinent reports  and I have personally reviewed pertinent films in PACS    Medications:  Current Facility-Administered Medications   Medication Dose Route Frequency    acetaminophen (TYLENOL) tablet 975 mg  975 mg Oral Q8H [address]    HYDROmorphone (DILAUDID) injection 0 2 mg  0 2 mg Intravenous Q4H PRN    insulin lispro (HumaLOG) 100 units/mL subcutaneous injection 1-5 Units  1-5 Units Subcutaneous Q6H [address]    insulin lispro (HumaLOG) 100 units/mL subcutaneous injection 1-5 Units  1-5 Units Subcutaneous HS    levETIRAcetam (KEPPRA) 500 mg in sodium chloride 0 9 % 100 mL IVPB  500 mg Intravenous Q12H [address]    multi-electrolyte (PLASMALYTE-A/ISOLYTE-S PH 7 4) IV solution  50 mL/hr Intravenous Continuous    ondansetron (ZOFRAN) injection 4 mg  4 mg Intravenous Q6H PRN    oxyCODONE (ROXICODONE) IR tablet 2 5 mg  2 5 mg Oral Q4H PRN    oxyCODONE (ROXICODONE) IR tablet 5 mg  5 mg Oral Q4H PRN    tetanus-diphtheria-acellular pertussis (BOOSTRIX) IM injection 0 5 mL  0 5 mL Intramuscular Once     Home medications:  Prior to Admission Medications   Prescriptions Last Dose Informant Patient Reported? Taking?    FREESTYLE LITE test strip  Child No No   Si each by Other route 3 (three) times a day Test   Fluad Quadrivalent 0 5 ML PRSY  Child Yes No   Sig: PHARMACY ADMINISTERED   Lancets (freestyle) lancets  Child No No   Sig: Use as instructed   Olopatadine HCl (Pazeo) 0 7 % SOLN  Child No No   Sig: Administer 1 drop into the left eye daily at bedtime   Patient not taking: Reported on 12/30/2020   Omega-3 Fatty Acids (fish oil) 1,000 mg  Child No No   Sig: Take 2 capsules (2,000 mg total) by mouth daily   Patient not taking: Reported on 12/30/2020   amLODIPine (NORVASC) 10 mg tablet  Child No No   Sig: Take 1 tablet (10 mg total) by mouth daily   apixaban (ELIQUIS) 5 mg   No No   Sig: Take 1 tablet (5 mg total) by mouth 2 (two) times a day   aspirin 81 mg chewable tablet  Child No No   Sig: Chew 1 tablet (81 mg total) daily   atorvastatin (LIPITOR) 40 mg tablet  Child No No   Sig: Take 1 tablet (40 mg total) by mouth every evening   Patient not taking: Reported on 6/9/2021   carbamide peroxide (DEBROX) 6 5 % otic solution   No No   Sig: Administer 5 drops into both ears 2 (two) times a day   ergocalciferol (VITAMIN D2) 50,000 units  Child No No   Sig: Take 1 capsule (50,000 Units total) by mouth once a week   glimepiride (AMARYL) 4 mg tablet  Child No No   Sig: Take 1 tablet (4 mg total) by mouth 2 (two) times a day   levothyroxine 25 mcg tablet  Child No No   Sig: Take 1 tablet (25 mcg total) by mouth daily   metFORMIN (GLUCOPHAGE) 1000 MG tablet   No No   Sig: TAKE 1 TABLET BY MOUTH TWICE A DAY WITH MEALS   Patient not taking: Reported on 6/9/2021   nitrofurantoin (MACROBID) 100 mg capsule  Child Yes No   Sig: Take 100 mg by mouth 2 (two) times a day   olopatadine HCl (PATADAY) 0 2 % opth drops  Child Yes No   pantoprazole (PROTONIX) 20 mg tablet  Child No No   Sig: Take 1 tablet (20 mg total) by mouth daily before breakfast   Patient not taking: Reported on 6/9/2021      Facility-Administered Medications: None     Allergies:  No Known Allergies    -----------------------------------------------------------------------------------------------------------    Care Time Delivered:   Upon my evaluation, this patient had a high probability of imminent or life-threatening deterioration due to Subdural hematoma, which required my direct attention, intervention, and personal management  I have personally provided 30 minutes (1630 to 1700) of critical care time, exclusive of procedures, teaching, family meetings, and any prior time recorded by providers other than myself  SIGNATURE: YA Leroy    Portions of the record may have been created with voice recognition software  Occasional wrong word or "sound a like" substitutions may have occurred due to the inherent limitations of voice recognition software    Read the chart carefully and recognize, using context, where substitutions have occurred

## 2021-06-27 NOTE — H&P
[address] 4/7/1927, 80 y o  female MRN: [id number]  Unit/Bed#: ED 25 Encounter: [id number]  Primary Care Provider: YA Kirby   Date and time admitted to hospital: 6/27/2021  2:06 PM    Fall  Assessment & Plan  -possible syncopal fall as patient alludes to dizziness prior to falling  -sustaining a below stated injuries  -geriatrics consult placed  -PT and OT evaluations ordered    Subdural hematoma (HCC)  Assessment & Plan  - Neuro exam: GCS 15, non-focal  - Continue neurologic checks: Every 1 hours  - Reversal agent administered: K-Centra and DDAVP  - CT scan of the head on 6/27 reviewed:  Left parafalcine subdural hematoma  - follow-up Neurosurgery evaluation and recommendations  - Complete 7 day course of Keppra for seizure prophylaxis  - Chemical DVT prophylaxis: Not cleared for chemical prophylaxis by neurosurgery at this time  Continue SCDs bilaterally  - Hold all anticoagulants and anti platelet medications for 2 weeks and/or until cleared by Neurosurgery to resume   - PT and OT (including cognitive evaluation) evaluation and treatment as indicated        Cephalohematoma  Assessment & Plan  -local wound care  -no underlying skull or facial bone fracture    Platelet dysfunction (HCC)  Assessment & Plan  -secondary to aspirin  -hold aspirin  -administer DDAVP  -risks and benefits of aspirin reversal discussed with patient and daughter who is at bedside and they are in agreement    History of diabetes mellitus  Assessment & Plan  -sliding scale insulin while patient is NPO  -hold home medications  -goal blood sugar less than 180  -Accu-Cheks    Coagulopathy ([facility] [address] )  Assessment & Plan  -secondary to Eliquis which patient takes for atrial fibrillation  -hold Eliquis and administer Kcentra  -risks and benefits of Eliquis reversal including stroke discussed with patient and daughter at bedside who are in agreement    History of atrial fibrillation  Assessment & Plan  -with stroke approximately 5 months ago, per patient's daughter  -rate is controlled, monitor on telemetry due to possible syncope  -resume home medications withholding aspirin and Eliquis due to intracranial hemorrhage    Syncope  Assessment & Plan  - patient reportedly became dizzy and then fell out of bed today after getting up from a nap  Daughter suspects this may be because she did not have her walker nearby however is concerned that something may have caused her to possibly pass out and fall   -will perform syncope workup with monitoring on telemetry, and checking orthostatic vital signs, EKG and troponins          Assessment/Plan   Trauma Alert: Level B  Model of Arrival: Ambulance  Trauma Team: Attending Raegan Martinez and KHURRAM Liao  Consultants: Neurosurgery: Harry Barton PA-C,Time Called 1500, Returned call: Yes 1500    Chief Complaint:  My head hurts    History of Present Illness   HPI:  Kita Henry is a 80 y o  female who presents status post fall while getting out of bed at home  Patient states she was getting up from a nap and felt dizzy, falling forward and hitting her face on the carpeted floor  She typically uses a walker and was not using her walker to help herself get up  She does take aspirin and Eliquis due to history of atrial fibrillation and prior stroke approximately 5 months ago  She has significant swelling and discomfort over her forehead in complains of a headache  She is not sure if she lost consciousness  Mechanism:Fall    Cervical Collar Clearance: The patient had a CT scan of the cervical spine demonstrating no acute injury  On exam, the patient had no midline point tenderness or paresthesias/numbness/weakness in the extremities  The patient had full range of motion (was then able to flex, extend, and rotate head laterally) without pain  There were no distracting injuries and the patient was not intoxicated        The patient's cervical spine was cleared radiologically and clinically  Cervical collar removed at this time  Ortiz Jack PA-C  6/27/2021 4:03 PM       Review of Systems   Constitutional: Negative  HENT: Positive for facial swelling          + swelling over forehead from where she struck it on the ground   Eyes: Negative  Respiratory: Negative  Negative for chest tightness  Cardiovascular: Negative  Negative for chest pain  Gastrointestinal: Negative  Genitourinary: Negative  Musculoskeletal: Negative  Skin: Negative  Neurological: Positive for dizziness and headaches  Hematological: Negative  Psychiatric/Behavioral: Negative  12-point, complete review of systems was reviewed and negative except as stated above  Historical Information   History is unobtainable from the patient due to poor historian    Efforts to obtain history included the following sources: family member, obtained from other records    Past Medical History:   Diagnosis Date    Arthritis     Diabetes ([facility] [address] )     Type 2    Glaucoma     Headache     Heartburn     Hypercholesteremia     Hypertension     Hypothyroidism     Migraines     Night sweats     Weight gain      Past Surgical History:   Procedure Laterality Date    CHOLECYSTECTOMY      TUBAL LIGATION      TUMOR REMOVAL Left     WISDOM TOOTH EXTRACTION       Social History   Social History     Substance and Sexual Activity   Alcohol Use Not Currently     Social History     Substance and Sexual Activity   Drug Use Never     Social History     Tobacco Use   Smoking Status Never Smoker   Smokeless Tobacco Never Used     E-Cigarette/Vaping    E-Cigarette Use Never User      E-Cigarette/Vaping Substances     Immunization History   Administered Date(s) Administered    H1N1, All Formulations 01/07/2010    INFLUENZA 10/13/2004, 10/12/2007, 09/17/2009, 11/15/2011, 09/23/2017, 10/09/2018    Pneumococcal Polysaccharide PPV23 11/22/2011    SARS-CoV-2 / COVID-19 mRNA IM ([employer]) 03/10/2021, 2021    influenza, trivalent, adjuvanted 2019     Last Tetanus:  Updated today, 2021  Family History: Non-contributory    Meds/Allergies   all current active meds have been reviewed and PTA meds:   Prior to Admission Medications   Prescriptions Last Dose Informant Patient Reported? Taking?    FREESTYLE LITE test strip  Child No No   Si each by Other route 3 (three) times a day Test   Fluad Quadrivalent 0 5 ML PRSY  Child Yes No   Sig: PHARMACY ADMINISTERED   Lancets (freestyle) lancets  Child No No   Sig: Use as instructed   Olopatadine HCl (Pazeo) 0 7 % SOLN  Child No No   Sig: Administer 1 drop into the left eye daily at bedtime   Patient not taking: Reported on 2020   Omega-3 Fatty Acids (fish oil) 1,000 mg  Child No No   Sig: Take 2 capsules (2,000 mg total) by mouth daily   Patient not taking: Reported on 2020   amLODIPine (NORVASC) 10 mg tablet  Child No No   Sig: Take 1 tablet (10 mg total) by mouth daily   apixaban (ELIQUIS) 5 mg   No No   Sig: Take 1 tablet (5 mg total) by mouth 2 (two) times a day   aspirin 81 mg chewable tablet  Child No No   Sig: Chew 1 tablet (81 mg total) daily   atorvastatin (LIPITOR) 40 mg tablet  Child No No   Sig: Take 1 tablet (40 mg total) by mouth every evening   Patient not taking: Reported on 2021   carbamide peroxide (DEBROX) 6 5 % otic solution   No No   Sig: Administer 5 drops into both ears 2 (two) times a day   ergocalciferol (VITAMIN D2) 50,000 units  Child No No   Sig: Take 1 capsule (50,000 Units total) by mouth once a week   glimepiride (AMARYL) 4 mg tablet  Child No No   Sig: Take 1 tablet (4 mg total) by mouth 2 (two) times a day   levothyroxine 25 mcg tablet  Child No No   Sig: Take 1 tablet (25 mcg total) by mouth daily   metFORMIN (GLUCOPHAGE) 1000 MG tablet   No No   Sig: TAKE 1 TABLET BY MOUTH TWICE A DAY WITH MEALS   Patient not taking: Reported on 2021   nitrofurantoin (MACROBID) 100 mg capsule  Child Yes No   Sig: Take 100 mg by mouth 2 (two) times a day   olopatadine HCl (PATADAY) 0 2 % opth drops  Child Yes No   pantoprazole (PROTONIX) 20 mg tablet  Child No No   Sig: Take 1 tablet (20 mg total) by mouth daily before breakfast   Patient not taking: Reported on 6/9/2021      Facility-Administered Medications: None       No Known Allergies      PHYSICAL EXAM    Objective   Vitals:   First set: Temperature: 97 8 °F (36 6 °C) (06/27/21 1359)  Pulse: 69 (06/27/21 1405)  Respirations: 16 (06/27/21 1359)  Blood Pressure: (!) 178/108 (06/27/21 1405)    Primary Survey:   (A) Airway:  Intact  (B) Breathing:  Equal bilaterally  (C) Circulation: Pulses:   normal  (D) Disabliity:  GCS Total:  15  (E) Expose:  Completed    Secondary Survey: (Click on Physical Exam tab above)  Physical Exam  Constitutional:       Appearance: Normal appearance  HENT:      Head:      Comments: + cephalohematoma of the forehead with left periorbital ecchymosis and small abrasion over the nasal bridge     Nose: Nose normal       Mouth/Throat:      Mouth: Mucous membranes are moist    Eyes:      Pupils: Pupils are equal, round, and reactive to light  Neck:      Comments: +midline cervical spine tenderness  Cervical collar place  Cardiovascular:      Rate and Rhythm: Normal rate and regular rhythm  Pulses: Normal pulses  Pulmonary:      Effort: Pulmonary effort is normal  No respiratory distress  Breath sounds: Normal breath sounds  Abdominal:      General: Abdomen is flat  There is no distension  Palpations: Abdomen is soft  Tenderness: There is no abdominal tenderness  There is no guarding  Genitourinary:     General: Normal vulva  Musculoskeletal:         General: No swelling or tenderness  Normal range of motion  Cervical back: Normal range of motion and neck supple        Comments: +cervical and lumbar spine tenderness to palpation over midline  + pain in the left hip with passive range of motion  No tenderness to palpation the left hip  Skin:     General: Skin is warm and dry  Capillary Refill: Capillary refill takes less than 2 seconds  Neurological:      General: No focal deficit present  Mental Status: She is alert and oriented to person, place, and time  Sensory: No sensory deficit  Motor: No weakness  Psychiatric:         Behavior: Behavior normal          Invasive Devices     Peripheral Intravenous Line            Peripheral IV 06/27/21 Left Antecubital <1 day                Lab Results:   Results: I have personally reviewed pertinent reports   , BMP/CMP:   Lab Results   Component Value Date    CO2  06/27/2021      Comment:      Test Error    GLUCOSE 271 (H) 06/27/2021   , CBC:   Lab Results   Component Value Date    HGB 14 3 06/27/2021    HCT 42 06/27/2021    and Coagulation: No results found for: PT, INR, APTT  Imaging/EKG Studies: Results: I have personally reviewed pertinent reports  TRAUMA - CT head wo contrast    Result Date: 6/27/2021  Impression: Large forehead subcutaneous hematoma without associated calvarial fracture  There is a small acute left anterior parafalcine subdural hematoma (series 2 image 34 ) I personally discussed this study with Elsy Moreno on 6/27/2021 at 2:57 PM   Workstation performed: [id number]     TRAUMA - CT facial bones wo contrast    Result Date: 6/27/2021  Impression: Large forehead subcutaneous hematoma, without associated facial bone fracture  Chronic paranasal sinus disease with also thickening in both maxillary sinuses and sphenoid sinuses  Air-fluid level in the right maxillary sinus could represent superimposed acute sinusitis  I personally discussed this study with Elsy Moreno on 6/27/2021 at 2:57 PM  Workstation performed: [id number]     TRAUMA - CT spine cervical wo contrast    Result Date: 6/27/2021  Impression: No cervical spine fracture or traumatic malalignment   I personally discussed this study with Kaylen Roles on 6/27/2021 at 2:57 PM   Workstation performed: [id number]     TRAUMA - CT chest abdomen pelvis w contrast    Result Date: 6/27/2021  Impression: No evidence of acute traumatic injury to the chest, abdomen, and pelvis  I personally discussed this study with Kaylen Roles on 6/27/2021 at 2:57 PM  Workstation performed: [id number]     XR Trauma multiple ([facility] trauma bay ONLY)    Result Date: 6/27/2021  Impression: No acute cardiopulmonary disease within limitations of supine imaging   Workstation performed: [id number]   Other Studies:  None    Code Status: Level 3 - DNAR and DNI  Advance Directive and Living Will:      Power of :    POLST:

## 2021-06-27 NOTE — ASSESSMENT & PLAN NOTE
-secondary to Eliquis which patient takes for atrial fibrillation  -hold Eliquis and administer Kcentra  -risks and benefits of Eliquis reversal including stroke discussed with patient and daughter at bedside who are in agreement

## 2021-06-27 NOTE — ASSESSMENT & PLAN NOTE
-with stroke approximately 5 months ago, per patient's daughter  -rate is controlled, monitor on telemetry due to possible syncope  -resume home medications withholding aspirin and Eliquis due to intracranial hemorrhage

## 2021-06-27 NOTE — PROCEDURES
POC FAST US    Date/Time: 6/27/2021 2:45 PM  Performed by: Daphne Warner PA-C  Authorized by: Daphne Warner PA-C     Patient location:  Trauma  Procedure details:     Exam Type:  Diagnostic    Indications: blunt abdominal trauma      Assess for:  Intra-abdominal fluid and pericardial effusion    Technique: FAST      Views obtained:  Heart - Pericardial sac, RUQ - Dawson's Pouch, LUQ - Splenorenal space and Suprapubic - Pouch of Aj    Image quality: diagnostic      Image availability:  Images available in PACS  FAST Findings:     RUQ (Hepatorenal) free fluid: absent      LUQ (Splenorenal) free fluid: absent      Suprapubic free fluid: absent      Cardiac wall motion: identified      Pericardial effusion: absent    Interpretation:     Impressions: negative

## 2021-06-27 NOTE — ASSESSMENT & PLAN NOTE
-sliding scale insulin while patient is NPO  -hold home medications  -goal blood sugar less than 180  -Accu-Cheks

## 2021-06-27 NOTE — ASSESSMENT & PLAN NOTE
-secondary to aspirin  -hold aspirin  -administer DDAVP  -risks and benefits of aspirin reversal discussed with patient and daughter who is at bedside and they are in agreement

## 2021-06-28 ENCOUNTER — APPOINTMENT (OUTPATIENT)
Dept: CT IMAGING | Facility: HOSPITAL | Age: 86
DRG: 083 | End: 2021-06-28
Payer: MEDICARE

## 2021-06-28 PROBLEM — F32.A DEPRESSION: Status: ACTIVE | Noted: 2021-06-28

## 2021-06-28 PROBLEM — E87.6 HYPOKALEMIA: Status: ACTIVE | Noted: 2021-06-28

## 2021-06-28 PROBLEM — G47.09 OTHER INSOMNIA: Status: ACTIVE | Noted: 2021-06-28

## 2021-06-28 PROBLEM — R35.0 URINARY FREQUENCY: Status: ACTIVE | Noted: 2021-06-28

## 2021-06-28 LAB
ANION GAP SERPL CALCULATED.3IONS-SCNC: 10 MMOL/L (ref 4–13)
ATRIAL RATE: 63 BPM
BASOPHILS # BLD AUTO: 0.03 THOUSANDS/ΜL (ref 0–0.1)
BASOPHILS NFR BLD AUTO: 0 % (ref 0–1)
BUN SERPL-MCNC: 11 MG/DL (ref 5–25)
CALCIUM SERPL-MCNC: 9.6 MG/DL (ref 8.3–10.1)
CHLORIDE SERPL-SCNC: 104 MMOL/L (ref 100–108)
CO2 SERPL-SCNC: 27 MMOL/L (ref 21–32)
CREAT SERPL-MCNC: 0.6 MG/DL (ref 0.6–1.3)
EOSINOPHIL # BLD AUTO: 0.02 THOUSAND/ΜL (ref 0–0.61)
EOSINOPHIL NFR BLD AUTO: 0 % (ref 0–6)
ERYTHROCYTE [DISTWIDTH] IN BLOOD BY AUTOMATED COUNT: 12.2 % (ref 11.6–15.1)
GFR SERPL CREATININE-BSD FRML MDRD: 78 ML/MIN/1.73SQ M
GLUCOSE P FAST SERPL-MCNC: 174 MG/DL (ref 65–99)
GLUCOSE SERPL-MCNC: 162 MG/DL (ref 65–140)
GLUCOSE SERPL-MCNC: 174 MG/DL (ref 65–140)
GLUCOSE SERPL-MCNC: 177 MG/DL (ref 65–140)
GLUCOSE SERPL-MCNC: 187 MG/DL (ref 65–140)
GLUCOSE SERPL-MCNC: 254 MG/DL (ref 65–140)
GLUCOSE SERPL-MCNC: 329 MG/DL (ref 65–140)
HCT VFR BLD AUTO: 39 % (ref 34.8–46.1)
HGB BLD-MCNC: 13.4 G/DL (ref 11.5–15.4)
IMM GRANULOCYTES # BLD AUTO: 0.04 THOUSAND/UL (ref 0–0.2)
IMM GRANULOCYTES NFR BLD AUTO: 0 % (ref 0–2)
LYMPHOCYTES # BLD AUTO: 1.64 THOUSANDS/ΜL (ref 0.6–4.47)
LYMPHOCYTES NFR BLD AUTO: 18 % (ref 14–44)
MAGNESIUM SERPL-MCNC: 1.9 MG/DL (ref 1.6–2.6)
MCH RBC QN AUTO: 30.9 PG (ref 26.8–34.3)
MCHC RBC AUTO-ENTMCNC: 34.4 G/DL (ref 31.4–37.4)
MCV RBC AUTO: 90 FL (ref 82–98)
MONOCYTES # BLD AUTO: 0.95 THOUSAND/ΜL (ref 0.17–1.22)
MONOCYTES NFR BLD AUTO: 11 % (ref 4–12)
NEUTROPHILS # BLD AUTO: 6.23 THOUSANDS/ΜL (ref 1.85–7.62)
NEUTS SEG NFR BLD AUTO: 71 % (ref 43–75)
NRBC BLD AUTO-RTO: 0 /100 WBCS
P AXIS: 51 DEGREES
PLATELET # BLD AUTO: 248 THOUSANDS/UL (ref 149–390)
PMV BLD AUTO: 9.2 FL (ref 8.9–12.7)
POTASSIUM SERPL-SCNC: 3.1 MMOL/L (ref 3.5–5.3)
PR INTERVAL: 176 MS
QRS AXIS: -10 DEGREES
QRSD INTERVAL: 84 MS
QT INTERVAL: 426 MS
QTC INTERVAL: 435 MS
RBC # BLD AUTO: 4.33 MILLION/UL (ref 3.81–5.12)
SODIUM SERPL-SCNC: 141 MMOL/L (ref 136–145)
T WAVE AXIS: 70 DEGREES
VENTRICULAR RATE: 63 BPM
WBC # BLD AUTO: 8.91 THOUSAND/UL (ref 4.31–10.16)

## 2021-06-28 PROCEDURE — 82948 REAGENT STRIP/BLOOD GLUCOSE: CPT

## 2021-06-28 PROCEDURE — 99232 SBSQ HOSP IP/OBS MODERATE 35: CPT | Performed by: SURGERY

## 2021-06-28 PROCEDURE — 97163 PT EVAL HIGH COMPLEX 45 MIN: CPT

## 2021-06-28 PROCEDURE — 99222 1ST HOSP IP/OBS MODERATE 55: CPT | Performed by: FAMILY MEDICINE

## 2021-06-28 PROCEDURE — 80048 BASIC METABOLIC PNL TOTAL CA: CPT | Performed by: PHYSICIAN ASSISTANT

## 2021-06-28 PROCEDURE — 85025 COMPLETE CBC W/AUTO DIFF WBC: CPT | Performed by: PHYSICIAN ASSISTANT

## 2021-06-28 PROCEDURE — 90715 TDAP VACCINE 7 YRS/> IM: CPT | Performed by: PHYSICIAN ASSISTANT

## 2021-06-28 PROCEDURE — 83735 ASSAY OF MAGNESIUM: CPT | Performed by: PHYSICIAN ASSISTANT

## 2021-06-28 PROCEDURE — 99204 OFFICE O/P NEW MOD 45 MIN: CPT | Performed by: PHYSICIAN ASSISTANT

## 2021-06-28 PROCEDURE — G1004 CDSM NDSC: HCPCS

## 2021-06-28 PROCEDURE — 97167 OT EVAL HIGH COMPLEX 60 MIN: CPT

## 2021-06-28 PROCEDURE — 97116 GAIT TRAINING THERAPY: CPT

## 2021-06-28 PROCEDURE — 93010 ELECTROCARDIOGRAM REPORT: CPT | Performed by: INTERNAL MEDICINE

## 2021-06-28 PROCEDURE — 70450 CT HEAD/BRAIN W/O DYE: CPT

## 2021-06-28 RX ORDER — POTASSIUM CHLORIDE 20 MEQ/1
40 TABLET, EXTENDED RELEASE ORAL ONCE
Status: COMPLETED | OUTPATIENT
Start: 2021-06-28 | End: 2021-06-28

## 2021-06-28 RX ORDER — SODIUM CHLORIDE, SODIUM GLUCONATE, SODIUM ACETATE, POTASSIUM CHLORIDE, MAGNESIUM CHLORIDE, SODIUM PHOSPHATE, DIBASIC, AND POTASSIUM PHOSPHATE .53; .5; .37; .037; .03; .012; .00082 G/100ML; G/100ML; G/100ML; G/100ML; G/100ML; G/100ML; G/100ML
75 INJECTION, SOLUTION INTRAVENOUS CONTINUOUS
Status: DISCONTINUED | OUTPATIENT
Start: 2021-06-28 | End: 2021-06-29

## 2021-06-28 RX ORDER — LEVETIRACETAM 500 MG/1
500 TABLET ORAL EVERY 12 HOURS SCHEDULED
Status: DISCONTINUED | OUTPATIENT
Start: 2021-06-28 | End: 2021-06-30 | Stop reason: HOSPADM

## 2021-06-28 RX ORDER — HEPARIN SODIUM 5000 [USP'U]/ML
5000 INJECTION, SOLUTION INTRAVENOUS; SUBCUTANEOUS EVERY 8 HOURS SCHEDULED
Status: DISCONTINUED | OUTPATIENT
Start: 2021-06-29 | End: 2021-06-30 | Stop reason: HOSPADM

## 2021-06-28 RX ORDER — POTASSIUM CHLORIDE 20 MEQ/1
40 TABLET, EXTENDED RELEASE ORAL ONCE
Status: DISCONTINUED | OUTPATIENT
Start: 2021-06-28 | End: 2021-06-28

## 2021-06-28 RX ADMIN — NITROFURANTOIN (MONOHYDRATE/MACROCRYSTALS) 100 MG: 75; 25 CAPSULE ORAL at 08:15

## 2021-06-28 RX ADMIN — INSULIN LISPRO 1 UNITS: 100 INJECTION, SOLUTION INTRAVENOUS; SUBCUTANEOUS at 05:39

## 2021-06-28 RX ADMIN — TETANUS TOXOID, REDUCED DIPHTHERIA TOXOID AND ACELLULAR PERTUSSIS VACCINE, ADSORBED 0.5 ML: 5; 2.5; 8; 8; 2.5 SUSPENSION INTRAMUSCULAR at 02:07

## 2021-06-28 RX ADMIN — LEVETIRACETAM 500 MG: 500 TABLET ORAL at 21:54

## 2021-06-28 RX ADMIN — SODIUM CHLORIDE, SODIUM GLUCONATE, SODIUM ACETATE, POTASSIUM CHLORIDE, MAGNESIUM CHLORIDE, SODIUM PHOSPHATE, DIBASIC, AND POTASSIUM PHOSPHATE 75 ML/HR: .53; .5; .37; .037; .03; .012; .00082 INJECTION, SOLUTION INTRAVENOUS at 18:27

## 2021-06-28 RX ADMIN — INSULIN LISPRO 1 UNITS: 100 INJECTION, SOLUTION INTRAVENOUS; SUBCUTANEOUS at 12:50

## 2021-06-28 RX ADMIN — AMLODIPINE BESYLATE 10 MG: 10 TABLET ORAL at 08:15

## 2021-06-28 RX ADMIN — INSULIN LISPRO 2 UNITS: 100 INJECTION, SOLUTION INTRAVENOUS; SUBCUTANEOUS at 00:27

## 2021-06-28 RX ADMIN — ACETAMINOPHEN 975 MG: 325 TABLET, FILM COATED ORAL at 21:54

## 2021-06-28 RX ADMIN — POTASSIUM CHLORIDE 40 MEQ: 1500 TABLET, EXTENDED RELEASE ORAL at 08:15

## 2021-06-28 RX ADMIN — LEVETIRACETAM 500 MG: 100 INJECTION, SOLUTION INTRAVENOUS at 08:23

## 2021-06-28 NOTE — OCCUPATIONAL THERAPY NOTE
Occupational Therapy Evaluation     Patient Name: Hermelinda Bajwa  CGABF'H Date: 6/28/2021  Problem List  Principal Problem:    Subdural hematoma ([facility] )  Active Problems:    Allergic conjunctivitis of both eyes    Cephalohematoma    Fall    Platelet dysfunction (HCC)    Coagulopathy ([facility] )    History of diabetes mellitus    History of atrial fibrillation    Syncope    Hypokalemia    Other insomnia    Depression    Urinary frequency    Past Medical History  Past Medical History:   Diagnosis Date    Arthritis     Diabetes ([facility] )     Type 2    Glaucoma     Headache     Heartburn     Hypercholesteremia     Hypertension     Hypothyroidism     Migraines     Night sweats     Weight gain      Past Surgical History  Past Surgical History:   Procedure Laterality Date    CHOLECYSTECTOMY      TUBAL LIGATION      TUMOR REMOVAL Left     WISDOM TOOTH EXTRACTION          06/28/21 1030   OT Last Visit   OT Visit Date 06/28/21  (Monday)   Note Type   Note type Evaluation   Restrictions/Precautions   Weight Bearing Precautions Per Order No   Other Precautions Cognitive; Impulsive; Chair Alarm; Bed Alarm;Multiple lines;Telemetry; Fall Risk   Pain Assessment   Pain Assessment Tool 0-10   Pain Score No Pain   Home Living   Type of [address] [place] [address] One level; Other (Comment)  (0 MAURICIO)   Bathroom Shower/Tub Walk-in shower   Bathroom Toilet Standard   Bathroom Accessibility Accessible via walker   [address] [facility],[address]   Additional Comments Pt reports living w/ daughter and has 0 MAURICIO  Pt not accurate historian upon eval, but able to communicate wants / needs  Prior Function   Level of [place] Needs assistance with IADLs   Lives With Daughter   Receives Help From Family   ADL Assistance Independent   IADLs Needs assistance   Falls in the last 6 months 1 to 4  (+ fall history; 1 traumatic leading to admit)   Vocational Retired   Comments Pt reports using RW for functional mobility and I w/ basic, self care ADL   Needs assistance w/ IADL   Lifestyle   Autonomy Pt needs assistance w/ IADL and completes ADL w/ out assistance  Able to communicate in [place] but reports originally from [place]   Reciprocal Relationships Pt reports living w/ daughter   Service to Others Pt reports retired and worked in [address] [employer] Pt reports enjoying exercising  Pt added that there is not much to do anymore   Psychosocial   Patient Behaviors/Mood Cooperative;Pleasant   Subjective   Subjective "I can walk"   ADL   Where Assessed Edge of bed  (vs OOB in chair)   Eating Assistance 6  Modified independent   Eating Deficit Setup   Grooming Assistance 6  Modified Independent  (seated OOB in chair)   Grooming Deficit Setup   UB Bathing Assistance Unable to assess  (anticipate S based on fxal obs skills)   LB Bathing Assistance Unable to assess   UB Dressing Assistance 5  Supervision/Setup   UB Dressing Deficit Setup;Verbal cueing; Impulsive   LB Dressing Assistance 4  Minimal Assistance  (seated at EOB to don socks)   LB Dressing Deficit Setup;Steadying; Requires assistive device for steadying;Verbal cueing;Supervision/safety; Increased time to complete   Toileting Assistance  4  Minimal Assistance   Toileting Deficit Setup; Bedside commode;Verbal cueing; Increased time to complete;Supervison/safety   Additional Comments BP supine in L -66; seated at /82, standing 136/69  Impulsive and benefits from cues for pacing / tech during ADL tasks   Bed Mobility   Supine to Sit 4  Minimal assistance   Additional items Assist x 1; Increased time required;HOB elevated;Verbal cues  (line mgmt)   Sit to Supine Unable to assess   Additional Comments Pt seated OOB in chair post eval w/ needs met, call bell in reach and chair alarm activated   Transfers   Sit to Stand 4  Minimal assistance   Additional items Assist x 1; Increased time required;Verbal cues; Bedrails;Armrests; Impulsive   Stand to Sit 4  Minimal assistance   Additional items Assist x 1; Increased time required;Verbal cues; Bedrails;Armrests; Impulsive   Toilet transfer 4  Minimal assistance   Additional items Assist x 1;Commode;Bedrails;Armrests; Impulsive;Verbal cues   Functional Mobility   Functional Mobility 4  Minimal assistance  (CG/Steadying using RW)   Additional items Rolling walker   Balance   Static Sitting Fair +   Dynamic Sitting Poor +   Static Standing Fair -   Ambulatory Poor +   Activity Tolerance   Activity Tolerance Patient limited by fatigue   Medical Staff Made Aware care coordination w/ PT, RJ and spoke to CM, Meryle Johnson   RUMAURA Assessment   RUE Assessment WFL   RUE Strength   RUE Overall Strength Within Functional Limits - able to perform ADL tasks with strength   LUE Assessment   LUE Assessment WFL   LUE Strength   LUE Overall Strength Within Functional Limits - able to perform ADL tasks with strength   Hand Function   Gross Motor Coordination Functional   Fine Motor Coordination Functional   Sensation   Light Touch No apparent deficits  (to light touch B UE)   Sharp/Dull Not tested   Cognition   Overall Cognitive Status Impaired   Arousal/Participation Alert; Cooperative   Attention Attends with cues to redirect   Orientation Level Oriented to person;Oriented to place;Oriented to situation  (generally to time)   Memory Decreased recall of recent events   Following Commands Follows one step commands with increased time or repetition   Comments Identified pt by full name and birthdate  Pt able to communicate wants / needs in [place] but not primary language  Pt able to follow directions and provide limited social history  Limited recall details  Engaged in [place] w/ score of 21 indicating cognitive impariment  Pt able to report month and time w/ in one hour w/ out looking at the clock but unable to report year  Pt unable to count backwards from 20 to 1 w/ out error or omissions  Pt unable to stated months of year in reverse order   Unable to recall name and address after few minutes  Recommend ongoing evalaution of functional cognitive skills to assist in DC Planning   Assessment   Limitation Decreased ADL status; Decreased Safe judgement during ADL;Decreased cognition;Decreased endurance;Decreased self-care trans;Decreased high-level ADLs   Assessment Pt is a 79yo female [facility] on [facility]  Pt presents w/ subdural hematoma following a fall  Significant PMH impacting her occupational performance includes hx CVA, DM II, migraines, HTN, glaucoma, arthritis  Per neurosurgery, recommend activity as tolerated and fall precautions  Pt lives w/ daughter and has 0 MAURICIO  Pt uses RW for functional mobility and needs assistance w/ IADL  Pt added that her daughter works and is not home consistently  Pt w/ active OT orders and activity orders  Personal factors impacting performance include advanced age, difficulty completing IADL  Upon eval, pt alert and oriented to person, place and situation  Able to follow one step directions during functional tasks w/ + time; benefits from cues for pacing  Pt required min A to complete bed mobility  Pt required min A to complete LBD and S for UBD  Pt required min A to stand and min A for functional transfers/ mobility using RW  Pt presents w/ limited insight into deficits, decreased activity tolerance, decreased endurance, decreased standing tolerance, decreased standing balance, decreased cognition impacting her I w/dressing, bathing, oral hygiene, functional mobility, functional transfers, activity engagement, clothing mgmt  Pt completing ADL below baseline level of I and would benefit from OT while in acute care to address deficits  Recommend discharge home w/ Home OT and consistent supervision / assist w/ ADL vs post acute rehab when pend progress and A available at [facility] when medically stable for DC  Recommend ongoing functional cognitive eval to assist in DC Planing   Will continue to follow   Goals   Patient Goals Pt stated that she would like to return home and do what she was doing; enjoys exercising   Plan   Treatment Interventions ADL retraining;Functional transfer training;UE strengthening/ROM; Endurance training;Cognitive reorientation;Patient/family training;Equipment evaluation/education;Continued evaluation; Activityengagement; Energy conservation   Goal Expiration Date 07/08/21   OT Frequency 3-5x/wk   Recommendation   Recommendation Geriatric Consult   OT Discharge Recommendation Post acute rehabilitation services   Equipment Recommended Shower/Tub chair with back ($)   AM-PAC Daily Activity Inpatient   Lower Body Dressing 3   Bathing 2   Toileting 3   Upper Body Dressing 3   Grooming 4   Eating 4   Daily Activity Raw Score 19   Daily Activity Standardized Score (Calc for Raw Score >=11) 40 22   AM-[facility] Applied Cognition Inpatient   Following a Speech/Presentation 2   Understanding Ordinary Conversation 3   Taking Medications 3   Remembering Where Things Are Placed or Put Away 3   Remembering List of 4-5 Errands 2   Taking Care of Complicated Tasks 2   Applied Cognition Raw Score 15   Applied Cognition Standardized Score 33 54   Barthel Index   Feeding 10   Bathing 0   Grooming Score 5   Dressing Score 5   Bladder Score 0   Bowels Score 10   Toilet Use Score 5   Transfers (Bed/Chair) Score 10   Mobility (Level Surface) Score 0   Stairs Score 0   Barthel Index Score 45      The patient's raw score on the AM-PAC Daily Activity inpatient short form is 19, standardized score is 40 22, greater than 39 4  Patients at this level are likely to benefit from discharge to home  Please refer to the recommendation of the Occupational Therapist for safe discharge planning       Pt goals to be met by 7/8/2021:  -Pt will demonstrate good attention and participation in ongoing functional cognitive eval to assist in DC planning    -Pt will complete functional transfers to all surfaces w/ S using AD, DME as needed to max I w/ ADL Performance    -Pt will consistently engage in functional mobility using AD as needed to /from bathroom w/ S    -Pt will demonstrate good attention and understanding EC tech to max I w/ ADL performance    -Pt will consistently follow 2 step directions during ADL tasks w/ good recall and no more than 1 cue / prompt    -Pt will complete bed mobility supine <> sit w/ mod I to max I w/ ADL performance to return home    -Pt will demonstrate improved functional standing tolerance for at least 10 minutes w/ at least fair balance using AD as needed while engaged in ADL tasks standing at sink to max I w/ ADL performance to return home    [facility], OTR/L

## 2021-06-28 NOTE — PHYSICAL THERAPY NOTE
PHYSICAL THERAPY EVALUATION NOTE    Patient Name: Travis Bryson  EIWBG'T Date: 6/28/2021  AGE:   80 y o  Mrn:   [id number]  ADMIT DX:  Subdural hematoma ([facility] ) [M96 [id number]]  Head injury [S09 90XA]    Past Medical History:   Diagnosis Date    Arthritis     Diabetes ([facility] )     Type 2    Glaucoma     Headache     Heartburn     Hypercholesteremia     Hypertension     Hypothyroidism     Migraines     Night sweats     Weight gain      Length Of Stay: 0  PHYSICAL THERAPY EVALUATION :    06/28/21 1018   PT Last Visit   PT Visit Date 06/28/21   Pain Assessment   Pain Assessment Tool Pain Assessment not indicated - pt denies pain   Home Living   Type of [address] [place] [address] One level; Other (Comment)  (no MAURICIO)   Additional Comments lives w/ daughter  ambulates w/ walker  home alone during the day  independent w/ ADLs  + fall history  Prior Function   Comments pt seen supine in bed  agreed to PT eval  denied paino r dizziness  input was needed for task focus  Restrictions/Precautions   Other Precautions Chair Alarm; Bed Alarm;Multiple lines;Telemetry; Fall Risk;Hard of hearing   General   Additional Pertinent History room air resting pulse ox 97%  blood pressure supine 142/66 and 53 BPM, seated 151/82 and 61 BPM, standing 136/69  Family/Caregiver Present No   Cognition   Overall Cognitive Status Impaired   Arousal/Participation Cooperative   Attention Attends with cues to redirect   Orientation Level Oriented to person; Unable to assess  (pt was identified w/ full name, birth date)   Following Commands Follows one step commands with increased time or repetition   RUE Assessment   RUE Assessment WFL   LUE Assessment   LUE Assessment WFL   RLE Assessment   RLE Assessment WFL  (3+ to 4-/5)   LLE Assessment   LLE Assessment WFL  (3+ to 4-/5)   Light Touch   RLE Light Touch Grossly intact   LLE Light Touch Grossly intact   Bed Mobility Supine to Sit 4  Minimal assistance   Additional items Assist x 1;HOB elevated; Bedrails; Increased time required;Verbal cues;LE management  (for trunk/LE positioning, bedrail use)   Transfers   Sit to Stand 4  Minimal assistance   Additional items Assist x 1; Increased time required;Verbal cues  (for hand placement)   Stand to Sit 4  Minimal assistance   Additional items Assist x 1; Increased time required;Verbal cues  (for body positioning)   Ambulation/Elevation   Gait pattern Forward Flexion;Narrow DIXON;Shuffling; Short stride; Excessively slow   Gait Assistance 4  Minimal assist   Additional items Assist x 1;Verbal cues  (for walker positioning)   Assistive Device Rolling walker   Distance 3 feet  (additional not possible due to fatigue)   Balance   Static Sitting Fair +   Static Standing Fair -   Ambulatory Poor +  (w/ roller walker)   Activity Tolerance   Activity Tolerance Patient limited by fatigue   Nurse Made Aware spoke to Mickie Dwyer OT   Assessment   Prognosis Fair   Problem List Decreased strength;Decreased endurance; Impaired balance;Decreased mobility; Decreased cognition;Decreased safety awareness; Impaired hearing   Assessment Pt presents status post fall while getting out of bed at home  Dx: subdural hematoma, coagulopathy, syncope, and hypokalemia  order placed for PT eval and tx, w/ activity order of up w/ A  pt presents w/ comorbidities of arthritis, DM, glaucoma, hypercholesterolemia, HTN, a-fib, and  migraines and personal factors of advanced age, mobilizing w/ assistive device, positive fall history and hearing impairments  pt presents w/ weakness, decreased endurance, impaired cognition, impaired balance, gait deviations, impaired hearing, decreased safety awareness and fall risk   these impairments are evident in findings from physical examination (weakness), mobility assessment (need for min assist w/ all phases of mobility when usually mobilizing independently, tolerance to only 3 feet of ambulation and need for cueing for mobility technique), and Barthel Index: 45/100  pt needed input for task focus and mobility technique/safety  pt is at risk for falls due to physical and safety awareness deficits  pt's clinical presentation is unstable/unpredictable (evident in poor blood pressure control, need for assist w/ all phases of mobility when usually mobilizing independently, tolerance to only 3 feet of ambulation and need for input for task focus and mobility technique)  pt needs inpatient PT tx to improve mobility deficits and progress mobility training as appropriate  discharge recommendation is for inpatient rehab to reduce fall risk and maximize level of functional independence  Goals   Patient Goals go home and do all I was doing before   STG Expiration Date 07/08/21   Short Term Goal #1 pt will: Increase bilateral LE strength 1/2 grade to facilitate independent mobility, Perform all bed mobility tasks w/ supervision to decrease fall risk factors, Perform all transfers w/ supervision to improve independence, Ambulate 150 ft  with roller walker w/ supervision w/o LOB to facilitate eventual safe return home, Increase ambulatory balance 1 grade to decrease risk for falls, Complete exercise program w/ less than 25% input from therapist during session to increase strength and endurance, Tolerate 3 hr OOB to faciliate upright tolerance and Improve Barthel Index score to 80 or greater to facilitate independence   Plan   Treatment/Interventions Functional transfer training;LE strengthening/ROM; Therapeutic exercise; Endurance training;Cognitive reorientation;Patient/family training;Equipment eval/education; Bed mobility;Gait training   PT Frequency 5x/wk   Recommendation   PT Discharge Recommendation Post acute rehabilitation services   Additional Comments recommend roller walker use w/ mobility   AM-PAC Basic Mobility Inpatient   Turning in Bed Without Bedrails 3   Lying on Back to Sitting on Edge of Flat Bed 3   Moving Bed to Chair 3   Standing Up From Chair 3   Walk in Room 2   Climb 3-5 Stairs 1   Basic Mobility Inpatient Raw Score 15   Basic Mobility Standardized Score 36 97   Barthel Index   Feeding 10   Bathing 0   Grooming Score 5   Dressing Score 5   Bladder Score 0   Bowels Score 10   Toilet Use Score 5   Transfers (Bed/Chair) Score 10   Mobility (Level Surface) Score 0   Stairs Score 0   Barthel Index Score 45     The patient's AM-PAC Basic Mobility Inpatient Short Form Raw Score is 15, Standardized Score is 36 97  A standardized score less than 42 9 suggests the patient may benefit from discharge to post-acute rehabilitation services  Please also refer to the recommendation of the Physical Therapist for safe discharge planning  Skilled PT recommended while in hospital and upon DC to progress pt toward treatment goals       Sly Cali, PT

## 2021-06-28 NOTE — PLAN OF CARE
Problem: OCCUPATIONAL THERAPY ADULT  Goal: Performs self-care activities at highest level of function for planned discharge setting  See evaluation for individualized goals  Description: Treatment Interventions: ADL retraining, Functional transfer training, UE strengthening/ROM, Endurance training, Cognitive reorientation, Patient/family training, Equipment evaluation/education, Continued evaluation, Activityengagement, Energy conservation  Equipment Recommended: Shower/Tub chair with back ($)       See flowsheet documentation for full assessment, interventions and recommendations  6/28/2021 1206 by Colonel Jefferson OT  Note: Limitation: Decreased ADL status, Decreased Safe judgement during ADL, Decreased cognition, Decreased endurance, Decreased self-care trans, Decreased high-level ADLs     Assessment: Pt is a 81yo female [facility]2021  Pt presents w/ subdural hematoma following a fall  Significant PMH impacting her occupational performance includes hx CVA, DM II, migraines, HTN, glaucoma, arthritis  Per neurosurgery, recommend activity as tolerated and fall precautions  Pt lives w/ daughter and has 0 MAURICIO  Pt uses RW for functional mobility and needs assistance w/ IADL  Pt added that her daughter works and is not home consistently  Pt w/ active OT orders and activity orders  Personal factors impacting performance include advanced age, difficulty completing IADL  Upon eval, pt alert and oriented to person, place and situation  Able to follow one step directions during functional tasks w/ + time; benefits from cues for pacing  Pt required min A to complete bed mobility  Pt required min A to complete LBD and S for UBD  Pt required min A to stand and min A for functional transfers/ mobility using RW   Pt presents w/ limited insight into deficits, decreased activity tolerance, decreased endurance, decreased standing tolerance, decreased standing balance, decreased cognition impacting her I w/dressing, bathing, oral hygiene, functional mobility, functional transfers, activity engagement, clothing mgmt  Pt completing ADL below baseline level of I and would benefit from OT while in acute care to address deficits  Recommend discharge home w/ Home OT and consistent supervision / assist w/ ADL vs post acute rehab when pend progress and A available at [facility] when medically stable for DC  Recommend ongoing functional cognitive eval to assist in DC Planing  Will continue to follow  Recommendation: Geriatric Consult  OT Discharge Recommendation: Post acute rehabilitation services       6/28/2021 1206 by [facility]  Note: Limitation: Decreased ADL status, Decreased Safe judgement during ADL, Decreased cognition, Decreased endurance, Decreased self-care trans, Decreased high-level ADLs     Assessment: Pt is a 81yo female [facility] on 6/27/2021  Pt presents w/ subdural hematoma following a fall  Significant PMH impacting her occupational performance includes hx CVA, DM II, migraines, HTN, glaucoma, arthritis  Per neurosurgery, recommend activity as tolerated and fall precautions  Pt lives w/ daughter and has 0 MAURICIO  Pt uses RW for functional mobility and needs assistance w/ IADL  Pt added that her daughter works and is not home consistently  Pt w/ active OT orders and activity orders  Personal factors impacting performance include advanced age, difficulty completing IADL  Upon eval, pt alert and oriented to person, place and situation  Able to follow one step directions during functional tasks w/ + time; benefits from cues for pacing  Pt required min A to complete bed mobility  Pt required min A to complete LBD and S for UBD  Pt required min A to stand and min A for functional transfers/ mobility using RW   Pt presents w/ limited insight into deficits, decreased activity tolerance, decreased endurance, decreased standing tolerance, decreased standing balance, decreased cognition impacting her I w/dressing, bathing, oral hygiene, functional mobility, functional transfers, activity engagement, clothing mgmt  Pt completing ADL below baseline level of I and would benefit from OT while in acute care to address deficits  Recommend discharge home w/ Home OT and consistent supervision / assist w/ ADL vs post acute rehab when pend progress and A available at [facility] when medically stable for DC  Recommend ongoing functional cognitive eval to assist in DC Planing   Will continue to follow  Recommendation: Geriatric Consult  OT Discharge Recommendation: Post acute rehabilitation services

## 2021-06-28 NOTE — CONSULTS
Consultation - Neurosurgery   Roseline Seals 80 y o  female MRN: [id number]  Unit/Bed#: ICU 08 Encounter: [id number]    Images reviewed at morning rounds on 06/28/2021 at 7:00 a m  Patient was seen and examined on 06/28/2021 at 7:40 a m  Inpatient consult to Neurosurgery  Consult performed by: Karena Sellers PA-C  Consult ordered by: Kim Shea PA-C          Assessment/Plan               Assessment:  1  Small traumatic anterior L parafalcine SDH  2  Traumatic calvarial fracture  3  Hx of fall on Eliquis & baby ASA  4  Cephalhematoma      Plan:   · Exam: Patient slightly confused and disoriented to place, and date, otherwise communicates well  PERRL, EOMI 2 mm conj bilat  Finger-to-nose tests normal and without drift bilaterally  Strength is 5/5 bilaterally and sensation to light touch intact throughout  DTR 2+ without clonus bilaterally  DST & JPT intact bilaterally  Bluish discoloration of bilateral orbital lesion with left frontal cephalhematoma noted  · Imaging reviwed personally and by attendings  Results as follows:  · CT head without contrast on 06/27/2021 demonstrate large forehead subdural hematoma with associated calvarial fracture  Small acute left anterior  parafalcine subdural hematoma  · CT of cervical spine on 06/27/2021 demonstrate no cervical spine fracture or traumatic malalignment  · Repeat CT head wo in 12-24 hours-order placed  · Pain control:   Per Primary team  · DVT ppx: SCDs bilateral legs  · Seizure ppx:  Per Trauma  · Activity:  As tolerated  · PT/OT evaluation & treatment  · Medical Mx: Per primary team  · Neurocheck: Routine  · Repeat CT head wo if GCS drops 2 points/1H  · HOB>30-45 degrees  · sBP<160  · Recommend repeat CT head without contrast   Results pending  If repeat CT shows left anterior parafalcine subdural hematoma remains stable or resolving, consider signing of and follow-up in 2 weeks with CT head  No AC/AP for 2 weeks  Fall precaution        History of Present Illness     HPI: Lazaro Berumen is a 80 y o  female with PMHx of AFib on Eliquis and baby aspirin, hypertension, diabetes mellitus, arthritis, glaucoma, hypothyroidism, migraine headache, presented with small acute traumatic left anterior parafalcine subdural hematoma  Patient reports fell out of bed and she is unsure what makes her fall  History of baseline gait instability and uses walker for ambulation  History of stroke about 8 months ago  Denies history of frequent falls  Patient denies any loss of consciousness, seizure, speech or swallowing issues  Denies blurry vision or diplopia  Denies nausea, vomiting, bowel or bladder issues  Patient denies history of fever, chills, rigors, cough or chest pain  She denies history of smoking cigarettes or drinking alcohol  Image findings as described in the assessment section above  Review of Systems   Constitutional: Negative for activity change, appetite change, diaphoresis, fatigue and fever  HENT: Negative for trouble swallowing and voice change  Eyes: Negative for photophobia and visual disturbance  Respiratory: Negative for cough, shortness of breath and wheezing  Cardiovascular: Negative for chest pain, palpitations and leg swelling  Gastrointestinal: Negative for diarrhea, nausea and vomiting  Genitourinary: Negative for difficulty urinating  Musculoskeletal: Positive for gait problem  Negative for neck pain and neck stiffness  Neurological: Positive for syncope  Negative for dizziness, tremors, seizures, facial asymmetry, speech difficulty, weakness, light-headedness, numbness and headaches  Hematological: Does not bruise/bleed easily  Psychiatric/Behavioral: Positive for confusion  Negative for decreased concentration         Historical Information   Past Medical History:   Diagnosis Date    Arthritis     Diabetes ([facility] [address] )     Type 2    Glaucoma     Headache     Heartburn     Hypercholesteremia     Hypertension  Hypothyroidism     Migraines     Night sweats     Weight gain      Past Surgical History:   Procedure Laterality Date    CHOLECYSTECTOMY      TUBAL LIGATION      TUMOR REMOVAL Left     WISDOM TOOTH EXTRACTION       Social History     Substance and Sexual Activity   Alcohol Use Not Currently     Social History     Substance and Sexual Activity   Drug Use Never     Social History     Tobacco Use   Smoking Status Never Smoker   Smokeless Tobacco Never Used     Family History   Problem Relation Age of Onset    Other Mother         natural causes     Rheumatic fever Father     No Known Problems Sister     Other Brother         natural causes     No Known Problems Sister     Other Brother         natural causes     Diabetes Daughter     Allergies Daughter     No Known Problems Daughter     No Known Problems Daughter     No Known Problems Daughter     No Known Problems Son        Meds/Allergies   all current active meds have been reviewed, current meds:   Current Facility-Administered Medications   Medication Dose Route Frequency    acetaminophen (TYLENOL) tablet 975 mg  975 mg Oral Q8H [facility]    amLODIPine (NORVASC) tablet 10 mg  10 mg Oral Daily    hydrALAZINE (APRESOLINE) injection 10 mg  10 mg Intravenous Q6H PRN    HYDROmorphone (DILAUDID) injection 0 2 mg  0 2 mg Intravenous Q4H PRN    insulin lispro (HumaLOG) 100 units/mL subcutaneous injection 1-5 Units  1-5 Units Subcutaneous Q6H [facility]    levETIRAcetam (KEPPRA) 500 mg in sodium chloride 0 9 % 100 mL IVPB  500 mg Intravenous Q12H [facility]    levothyroxine tablet 25 mcg  25 mcg Oral Daily    nitrofurantoin (MACROBID) extended-release capsule 100 mg  100 mg Oral BID    ondansetron (ZOFRAN) injection 4 mg  4 mg Intravenous Q6H PRN    oxyCODONE (ROXICODONE) IR tablet 2 5 mg  2 5 mg Oral Q4H PRN    oxyCODONE (ROXICODONE) IR tablet 5 mg  5 mg Oral Q4H PRN    and PTA meds:   Prior to Admission Medications   Prescriptions Last Dose Informant Patient Reported? Taking?    FREESTYLE LITE test strip  Child No No   Si each by Other route 3 (three) times a day Test   Fluad Quadrivalent 0 5 ML PRSY  Child Yes No   Sig: PHARMACY ADMINISTERED   Lancets (freestyle) lancets  Child No No   Sig: Use as instructed   Olopatadine HCl (Pazeo) 0 7 % SOLN  Child No No   Sig: Administer 1 drop into the left eye daily at bedtime   Patient not taking: Reported on 2020   Omega-3 Fatty Acids (fish oil) 1,000 mg  Child No No   Sig: Take 2 capsules (2,000 mg total) by mouth daily   Patient not taking: Reported on 2020   amLODIPine (NORVASC) 10 mg tablet  Child No No   Sig: Take 1 tablet (10 mg total) by mouth daily   apixaban (ELIQUIS) 5 mg   No No   Sig: Take 1 tablet (5 mg total) by mouth 2 (two) times a day   aspirin 81 mg chewable tablet  Child No No   Sig: Chew 1 tablet (81 mg total) daily   atorvastatin (LIPITOR) 40 mg tablet  Child No No   Sig: Take 1 tablet (40 mg total) by mouth every evening   Patient not taking: Reported on 2021   carbamide peroxide (DEBROX) 6 5 % otic solution   No No   Sig: Administer 5 drops into both ears 2 (two) times a day   ergocalciferol (VITAMIN D2) 50,000 units  Child No No   Sig: Take 1 capsule (50,000 Units total) by mouth once a week   glimepiride (AMARYL) 4 mg tablet  Child No No   Sig: Take 1 tablet (4 mg total) by mouth 2 (two) times a day   levothyroxine 25 mcg tablet  Child No No   Sig: Take 1 tablet (25 mcg total) by mouth daily   metFORMIN (GLUCOPHAGE) 1000 MG tablet   No No   Sig: TAKE 1 TABLET BY MOUTH TWICE A DAY WITH MEALS   Patient not taking: Reported on 2021   nitrofurantoin (MACROBID) 100 mg capsule  Child Yes No   Sig: Take 100 mg by mouth 2 (two) times a day   olopatadine HCl (PATADAY) 0 2 % opth drops  Child Yes No   pantoprazole (PROTONIX) 20 mg tablet  Child No No   Sig: Take 1 tablet (20 mg total) by mouth daily before breakfast   Patient not taking: Reported on 2021      Facility-Administered Medications: None     No Known Allergies    Objective   I/O       06/26 0701 - 06/27 0700 06/27 0701 - 06/28 0700    I V   496 7    IV Piggyback  250    Total Intake  746 7    Urine  600    Total Output  600    Net  +146 7          Unmeasured Urine Occurrence  3 x          Physical Exam  HENT:      Head:      Comments: Traumatic with left frontal cephalhematoma, bluish discoloration of bilateral orbital/ periorbital areas  Eyes:      Extraocular Movements: Extraocular movements intact  Cardiovascular:      Rate and Rhythm: Normal rate and regular rhythm  Pulmonary:      Effort: Pulmonary effort is normal    Musculoskeletal:      Cervical back: Normal range of motion  Neurological:      General: No focal deficit present  Mental Status: She is alert  GCS: GCS eye subscore is 4  GCS verbal subscore is 4  GCS motor subscore is 6  Cranial Nerves: Cranial nerves are intact  Sensory: Sensation is intact  Motor: Motor function is intact  Coordination: Finger-Nose-Finger Test normal       Deep Tendon Reflexes: Reflexes are normal and symmetric  Reflex Scores:       Tricep reflexes are 2+ on the right side and 2+ on the left side  Bicep reflexes are 2+ on the right side and 2+ on the left side  Brachioradialis reflexes are 2+ on the right side and 2+ on the left side  Patellar reflexes are 2+ on the right side and 2+ on the left side  Achilles reflexes are 2+ on the right side and 2+ on the left side  Comments: Slightly disoriented   Psychiatric:         Speech: Speech normal        Neurologic Exam     Mental Status   Speech: speech is normal   Level of consciousness: alert    Cranial Nerves     CN III, IV, VI   Right pupil: Size: 2 mm  Shape: regular  Left pupil: Size: 2 mm  Shape: regular     Nystagmus: none     CN V   Right facial sensation deficit: none  Left facial sensation deficit: none    CN VII   Right facial weakness: none  Left facial weakness: none    CN XI   CN XI normal      Motor Exam   Overall muscle tone: normal  Right arm tone: normal  Left arm tone: normal  Right arm pronator drift: absent  Left arm pronator drift: absent  Right leg tone: normal  Left leg tone: normal    Sensory Exam   Light touch normal    Proprioception normal      Gait, Coordination, and Reflexes     Coordination   Finger to nose coordination: normal    Reflexes   Right brachioradialis: 2+  Left brachioradialis: 2+  Right biceps: 2+  Left biceps: 2+  Right triceps: 2+  Left triceps: 2+  Right patellar: 2+  Left patellar: 2+  Right achilles: 2+  Left achilles: 2+  Right : 2+  Left : 2+  Right plantar: normal  Left plantar: normal  Right Garcia: absent  Left Garcia: absent  Right ankle clonus: absent  Left pendular knee jerk: absent      Vitals:Blood pressure 149/64, pulse 56, temperature 97 5 °F (36 4 °C), temperature source Oral, resp  rate (!) 28, SpO2 96 %  ,There is no height or weight on file to calculate BMI       Lab Results:   Results from last 7 days   Lab Units 06/28/21  0536 06/27/21  1558 06/27/21  1410   WBC Thousand/uL 8 91 8 57  --    HEMOGLOBIN g/dL 13 4 14 9  --    I STAT HEMOGLOBIN g/dl  --   --  14 3   HEMATOCRIT % 39 0 44 3  --    HEMATOCRIT, ISTAT %  --   --  42   PLATELETS Thousands/uL 248 287  --    NEUTROS PCT % 71 59  --    MONOS PCT % 11 8  --      Results from last 7 days   Lab Units 06/28/21  0536 06/27/21  1558 06/27/21  1410   POTASSIUM mmol/L 3 1* 3 7  --    CHLORIDE mmol/L 104 104  --    CO2 mmol/L 27 21  --    BUN mg/dL 11 17  --    CREATININE mg/dL 0 60 0 82  --    CALCIUM mg/dL 9 6 9 6  --    ALK PHOS U/L  --  115  --    ALT U/L  --  30  --    AST U/L  --  22  --    GLUCOSE, ISTAT mg/dl  --   --  271*     Results from last 7 days   Lab Units 06/28/21  0536 06/27/21  1558   MAGNESIUM mg/dL 1 9 1 8     Results from last 7 days   Lab Units 06/27/21  1558   PHOSPHORUS mg/dL 2 9     Results from last 7 days   Lab Units 06/27/21  1558   INR 1 19     No results found for: TROPONINT  ABG:No results found for: PHART, DJV1BFD, PO2ART, NZT2NNK, V6TJGMYQ, BEART, SOURCE    Imaging Studies: I have personally reviewed pertinent reports   , I have personally reviewed pertinent films in PACS and I have personally reviewed pertinent films in PACS with a Radiologist     EKG, Pathology, and Other Studies: I have personally reviewed pertinent reports   , I have personally reviewed pertinent films in PACS and I have personally reviewed pertinent films in PACS with a Radiologist     VTE Prophylaxis: Sequential compression device (Venodyne)     Code Status: Level 3 - DNAR and DNI  Advance Directive and Living Will:      Power of :    POLST:      Counseling / Coordination of Care  I spent 20 minutes with the patient

## 2021-06-28 NOTE — ASSESSMENT & PLAN NOTE
Most likely mechanical fall  Patient uses walker for ambulation at baseline  She refused physical therapy today  Consider monitoring orthostatic vital signs  Monitor for dizziness and may try meclizine 12 5 mg b i d  P r n  Encourage p o   Intake  Avoid hypotension and hypoglycemia

## 2021-06-28 NOTE — ASSESSMENT & PLAN NOTE
· Neuro exam: GCS 15, non-focal  · Continue neurologic checks: Every 1 hours  · Reversal agent administered: K-Centra and DDAVP  · CT scan of the head on 6/27 reviewed:  Left parafalcine subdural hematoma  · Repeat head CT this am    · follow-up Neurosurgery evaluation and recommendations  · Complete 7 day course of Keppra for seizure prophylaxis  · Chemical DVT prophylaxis: Not cleared for chemical prophylaxis by neurosurgery at this time  Continue SCDs bilaterally  · Hold all anticoagulants and anti platelet medications for 2 weeks and/or until cleared by Neurosurgery to resume  · PT and OT (including cognitive evaluation) evaluation and treatment as indicated

## 2021-06-28 NOTE — ASSESSMENT & PLAN NOTE
· Uncontrolled DM   A1C 10 2 on 6/9/21  · Home regimen: Metformin/Glimeperide  · ISS, accuchecks q6h  · Maintain euglycemia, increase SSI

## 2021-06-28 NOTE — ASSESSMENT & PLAN NOTE
Patient reports feeling sad, states that she does not want to live long, she does not want to be a burden for her children  Had minimal p o  Intake today, very tearful  I contacted her daughter Kaiden Teixeira and encouraged family to get in touch with her and reassure her  Continue Lexapro 5 mg daily and monitor closely  I would recommend starting mirtazapine 7 5 mg q h s  For insomnia appetite  Continue supportive care

## 2021-06-28 NOTE — ASSESSMENT & PLAN NOTE
Currently sinus  Not on antiarrhytmic therapy as outpatient  Continue to hold Eliquis/ASA in setting Subdural

## 2021-06-28 NOTE — PLAN OF CARE
Problem: PHYSICAL THERAPY ADULT  Goal: Performs mobility at highest level of function for planned discharge setting  See evaluation for individualized goals  Description: Treatment/Interventions: Functional transfer training, LE strengthening/ROM, Therapeutic exercise, Endurance training, Cognitive reorientation, Patient/family training, Equipment eval/education, Bed mobility, Gait training          See flowsheet documentation for full assessment, interventions and recommendations  Outcome: Progressing  Note: Prognosis: Fair  Problem List: Decreased strength, Decreased endurance, Impaired balance, Decreased mobility, Decreased cognition, Decreased safety awareness, Impaired hearing  Assessment: Therapist provided education to pt for mobility technique including transfers and roller walker management  Education was provided due to findings from evaluation  Frequent repetition was needed for limited carryover to be noted  Pt was found to have improvement after education w/ increased ambulation tolerance though pt needs same level of assistance  Pt continues to be a fall risk  continued inpatient PT tx is indicated to reduce fall risk factors  PT Discharge Recommendation: Post acute rehabilitation services          See flowsheet documentation for full assessment

## 2021-06-28 NOTE — ASSESSMENT & PLAN NOTE
The patient reports insomnia at baseline, continue melatonin 3 mg p o  Q h s   Avoid caffeine especially in the afternoon  Encourage sleep hygiene

## 2021-06-28 NOTE — CONSULTS
Consultation - Geriatric Medicine   Jacquelyn Mendez 80 y o  female MRN: [id number]  Unit/Bed#: ICU 08 Encounter: [id number]      Assessment/Plan       Urinary frequency  Assessment & Plan  Patient reports urinary frequency, mild lower abdominal tenderness noted on exam   Hold nitrofurantoin as patient was not taking it at  home, no recent urine culture found  Consider checking a UA and C&S    Depression  Assessment & Plan  Patient reports feeling sad, states that she does not want the long, she does not want to be a burden for her children  Denies suicidal ideation  I would recommend starting Lexapro 5 mg daily and monitor closely  Continue supportive care  Other insomnia  Assessment & Plan  The patient reports insomnia at baseline, I would recommend melatonin 3 mg p o  Q h s   Avoid caffeine especially in the afternoon  Encourage sleep hygiene  Fall  Assessment & Plan  Most likely mechanical fall  Patient uses walker for ambulation at baseline  Physical therapy evaluation soon as possible  Consider monitoring orthostatic vital signs  Monitor for dizziness and may try meclizine 12 5 mg b i d  P r n  Encourage p o  Intake  Avoid hypotension and hypoglycemia      Allergic conjunctivitis of both eyes  Assessment & Plan  Patient reports that  my eyes are crying "  Continue olopatadine eye drops  Follow-up with Ophthalmology as outpatient    Type II or unspecified type diabetes mellitus without mention of complication, not stated as uncontrolled  Assessment & Plan  Lab Results   Component Value Date    HGBA1C 10 2 (A) 06/09/2021       Recent Labs     06/27/21  1814 06/28/21  0025 06/28/21  0530   POCGLU 310* 254* 187*       Blood Sugar Average: Last 72 hrs:  (P) 549 [id number]     Currently uncontrolled her last hemoglobin A1c was 10 2 in June 2021  Continue to monitor her Accu-Cheks  And continue sliding scale consider adding long-term insulin    Acceptable level for hemoglobin A1c is around 8 5   Consider checking TSH and vitamin B12 level  * Subdural hematoma (HCC)  Assessment & Plan  Status post fall  Neurosurgery follows, the repeat CT head results pending  Continue to hold anticoagulation  Monitor neuro checks  History of Present Illness   Physician Requesting Consult: Alice Cardona MD  Reason for Consult / Principal Problem: fall  Hx and PE limited by: confusion  Additional history obtained from: daughter Sissy Puente    HPI: Mikal Campos is a 80y o  year old female who presents status post fall found to have subdural hematoma and calvarial fracture  Patient was seen at bedside she reports mild frontal pain, denies dizziness weakness changes in vision, states she has good appetite  Reports difficulty sleeping  She reports lower abdominal tenderness urinary frequency, denies nausea at the time  She uses a walker for ambulation at baseline  The nursing staff she had an episode of vomiting overnight  No other acute events to report  Patient states that she lives with her daughter primary who is also her POA  She needs assistance with most of her IADLs  Patient reports that she feels sad and does not want to burden her children  Patient was alert oriented x2 during the encounter, slightly confused  Inpatient consult to Gerontology  Consult performed by: Larry Crisostomo MD  Consult ordered by: Etelvina Almonte PA-C          Review of Systems   Constitutional: Negative for chills and fever  HENT: Positive for hearing loss  Negative for congestion, rhinorrhea and trouble swallowing  Respiratory: Negative for cough, shortness of breath and wheezing  Cardiovascular: Negative for chest pain, palpitations and leg swelling  Gastrointestinal: Negative for abdominal pain and constipation  Endocrine: Negative for cold intolerance  Genitourinary: Positive for frequency  Negative for dysuria and hematuria  Musculoskeletal: Positive for gait problem     Skin: Negative for wound  Allergic/Immunologic: Negative for environmental allergies  Neurological: Positive for headaches  Negative for dizziness and seizures  Hematological: Bruises/bleeds easily  Psychiatric/Behavioral: Positive for sleep disturbance  Negative for behavioral problems  Historical Information   Past Medical History:   Diagnosis Date    Arthritis     Diabetes ([address] )     Type 2    Glaucoma     Headache     Heartburn     Hypercholesteremia     Hypertension     Hypothyroidism     Migraines     Night sweats     Weight gain      Past Surgical History:   Procedure Laterality Date    CHOLECYSTECTOMY      TUBAL LIGATION      TUMOR REMOVAL Left     WISDOM TOOTH EXTRACTION       Social History   Social History     Substance and Sexual Activity   Alcohol Use Not Currently     Social History     Substance and Sexual Activity   Drug Use Never     Social History     Tobacco Use   Smoking Status Never Smoker   Smokeless Tobacco Never Used     Family History:   Family History   Problem Relation Age of Onset    Other Mother         natural causes     Rheumatic fever Father     No Known Problems Sister     Other Brother         natural causes     No Known Problems Sister     Other Brother         natural causes     Diabetes Daughter     Allergies Daughter     No Known Problems Daughter     No Known Problems Daughter     No Known Problems Daughter     No Known Problems Son        Meds/Allergies   Her daughter Vangie Gonzalez will bring her list of medication in the afternoon for review      No Known Allergies    Objective     Intake/Output Summary (Last 24 hours) at 6/28/2021 1023  Last data filed at 6/28/2021 8353  Gross per 24 hour   Intake 876 67 ml   Output 650 ml   Net 226 67 ml     Invasive Devices     Peripheral Intravenous Line            Peripheral IV 06/27/21 Left Antecubital <1 day    Peripheral IV 06/27/21 Right Antecubital <1 day          Drain            External Urinary Catheter <1 day Physical Exam  Vitals and nursing note reviewed  Constitutional:       General: She is not in acute distress  Appearance: She is well-developed  HENT:      Head: Normocephalic  Comments: Bruise left frontal, b/l eyes     Ears:      Comments: Torres Martinez  Eyes:      Conjunctiva/sclera: Conjunctivae normal    Cardiovascular:      Rate and Rhythm: Normal rate and regular rhythm  Heart sounds: Murmur heard  Pulmonary:      Effort: Pulmonary effort is normal  No respiratory distress  Breath sounds: Normal breath sounds  Abdominal:      Palpations: Abdomen is soft  Tenderness: There is abdominal tenderness (mild lower abdomen)  Musculoskeletal:      Cervical back: Neck supple  Right lower leg: No edema  Left lower leg: No edema  Skin:     General: Skin is warm and dry  Neurological:      Mental Status: She is alert        Comments: AAOx2   Psychiatric:         Behavior: Behavior normal          Lab Results:   I have personally reviewed pertinent lab results including the following:  -CBC, CMP    I have personally reviewed the following imaging study reports in PACS:  - CT head            VTE Prophylaxis: Sequential compression device Bhavya Dwyer)     Code Status: Level 3 - DNAR and DNI  Power of :  her daughter Savannah Johnson and Social Support: lives with her daughter    Goals of Care: return home

## 2021-06-28 NOTE — ASSESSMENT & PLAN NOTE
Lab Results   Component Value Date    HGBA1C 10 2 (A) 06/09/2021       Recent Labs     06/27/21  1814 06/28/21  0025 06/28/21  0530   POCGLU 310* 254* 187*       Blood Sugar Average: Last 72 hrs:  (P) 876 [id number]     Currently uncontrolled her last hemoglobin A1c was 10 2 in June 2021  Continue to monitor her Accu-Cheks  And continue sliding scale consider adding long-term insulin  Acceptable level for hemoglobin A1c is around 8 5  Consider checking TSH and vitamin B12 level

## 2021-06-28 NOTE — ASSESSMENT & PLAN NOTE
· patient reportedly became dizzy and then fell out of bed today after getting up from a nap  Daughter suspects this may be because she did not have her walker nearby however is concerned that something may have caused her to possibly pass out and fall  · Orthostatics   Consider echo

## 2021-06-28 NOTE — ASSESSMENT & PLAN NOTE
Patient reports that  my eyes are crying "  Continue olopatadine eye drops    Follow-up with Ophthalmology as outpatient

## 2021-06-28 NOTE — PLAN OF CARE
Problem: Potential for Falls  Goal: Patient will remain free of falls  Description: INTERVENTIONS:  - Educate patient/family on patient safety including physical limitations  - Instruct patient to call for assistance with activity   - Consult OT/PT to assist with strengthening/mobility   - Keep Call bell within reach  - Keep bed low and locked with side rails adjusted as appropriate  - Keep care items and personal belongings within reach  - Initiate and maintain comfort rounds  - Make Fall Risk Sign visible to staff  - Offer Toileting every Hours, in advance of need  - Initiate/Maintain alarm  - Obtain necessary fall risk management equipment:  - Apply yellow socks and bracelet for high fall risk patients  - Consider moving patient to room near nurses station  Outcome: Not Progressing

## 2021-06-28 NOTE — PHYSICAL THERAPY NOTE
PHYSICAL THERAPY TREATMENT NOTE    Patient Name: Lazaro TELLEZGULENORE Date: 6/28/2021 06/28/21 1028   PT Last Visit   PT Visit Date 06/28/21   Pain Assessment   Pain Assessment Tool Pain Assessment not indicated - pt denies pain   Restrictions/Precautions   Other Precautions Chair Alarm; Bed Alarm;Multiple lines;Telemetry; Fall Risk;Hard of hearing   General   Chart Reviewed Yes   Family/Caregiver Present No   Cognition   Overall Cognitive Status Impaired   Arousal/Participation Cooperative   Attention Attends with cues to redirect   Orientation Level Oriented to person; Unable to assess  (pt was identified w/ full name, birth date)   Following Commands Follows one step commands with increased time or repetition   Subjective   Subjective pt agreed to PT intervention  mobility education was provided regarding transfer technique and roller walker use   education was completed via teachback, demonstration and verbal instruction  Transfers   Sit to Stand 4  Minimal assistance   Additional items Assist x 1; Increased time required;Verbal cues  (for hand placement, LE positioning)   Stand to Sit 4  Minimal assistance   Additional items Assist x 1; Increased time required;Verbal cues  (for body positioning, hand placement)   Ambulation/Elevation   Gait pattern Narrow DIXON;Shuffling; Short stride; Excessively slow; Foward flexed   Gait Assistance 4  Minimal assist   Additional items Assist x 1;Verbal cues  (for walker positioning, full step length, safety)   Assistive Device Rolling walker   Distance 5 feet x2 w/ seated rest break  (additional not possible due to fatigue)   Balance   Static Sitting Fair +   Static Standing Fair -   Ambulatory Poor +  (w/ roller walker)   Activity Tolerance   Activity Tolerance Patient limited by fatigue   Nurse Made Aware spoke to [address] [place] [address] OT   Assessment   Prognosis Fair   Problem List Decreased strength;Decreased endurance; Impaired balance;Decreased mobility; Decreased cognition;Decreased safety awareness; Impaired hearing   Assessment Therapist provided education to pt for mobility technique including transfers and roller walker management  Education was provided due to findings from evaluation  Frequent repetition was needed for limited carryover to be noted  Pt was found to have improvement after education w/ increased ambulation tolerance though pt needs same level of assistance  Pt continues to be a fall risk  continued inpatient PT tx is indicated to reduce fall risk factors  Goals   Patient Goals go home and do all I was doing before   STG Expiration Date 07/08/21   Short Term Goal #1 pt will: Increase bilateral LE strength 1/2 grade to facilitate independent mobility, Perform all bed mobility tasks w/ supervision to decrease fall risk factors, Perform all transfers w/ supervision to improve independence, Ambulate 150 ft  with roller walker w/ supervision w/o LOB to facilitate eventual safe return home, Increase ambulatory balance 1 grade to decrease risk for falls, Complete exercise program w/ less than 25% input from therapist during session to increase strength and endurance, Tolerate 3 hr OOB to faciliate upright tolerance and Improve Barthel Index score to 80 or greater to facilitate independence   PT Treatment Day 1   Plan   Treatment/Interventions Functional transfer training;LE strengthening/ROM; Therapeutic exercise; Endurance training;Cognitive reorientation;Patient/family training;Equipment eval/education; Bed mobility;Gait training   Progress Progressing toward goals   PT Frequency 5x/wk   Recommendation   PT Discharge Recommendation Post acute rehabilitation services   Additional Comments recommend roller walker use w/ mobility   AM-PAC Basic Mobility Inpatient   Turning in Bed Without Bedrails 3   Lying on Back to Sitting on Edge of Flat Bed 3   Moving Bed to Chair 3   Standing Up From Chair 3   Walk in Room 2   Climb 3-5 Stairs 1   Basic Mobility Inpatient Raw Score 15   Basic Mobility Standardized Score 36 97     Skilled inpatient PT recommended while in hospital to progress pt toward treatment goals      Colt Yeboah, PT

## 2021-06-28 NOTE — PROGRESS NOTES
[facility]  Progress Note - Raghav Lee 4/7/1927, 80 y o  female MRN: [id number]  Unit/Bed#: ICU 08 Encounter: [id number]  Primary Care Provider: YA Rutledge   Date and time admitted to hospital: 6/27/2021  2:06 PM    * Subdural hematoma [facility])  Assessment & Plan  · Neuro exam: GCS 15, non-focal  · Continue neurologic checks: Every 1 hours  · Reversal agent administered: K-Centra and DDAVP  · CT scan of the head on 6/27 reviewed:  Left parafalcine subdural hematoma  · Repeat head CT this am    · follow-up Neurosurgery evaluation and recommendations  · Complete 7 day course of Keppra for seizure prophylaxis  · Chemical DVT prophylaxis: Not cleared for chemical prophylaxis by neurosurgery at this time  Continue SCDs bilaterally  · Hold all anticoagulants and anti platelet medications for 2 weeks and/or until cleared by Neurosurgery to resume  · PT and OT (including cognitive evaluation) evaluation and treatment as indicated  Coagulopathy ([facility] [address] )  Assessment & Plan  · 2/2 Eliquis for afib  · S/p DDAVP/Kcentra  · Continue to hold Eliquis    Syncope  Assessment & Plan  · patient reportedly became dizzy and then fell out of bed today after getting up from a nap  Daughter suspects this may be because she did not have her walker nearby however is concerned that something may have caused her to possibly pass out and fall  · Orthostatics  Consider echo    Hypokalemia  Assessment & Plan  · Replete      History of atrial fibrillation  Assessment & Plan  Currently sinus  Not on antiarrhytmic therapy as outpatient  Continue to hold Eliquis/ASA in setting Subdural      History of diabetes mellitus  Assessment & Plan  · Uncontrolled DM  A1C 10 2 on 6/9/21  · Home regimen: Metformin/Glimeperide  · ISS, accuchecks q6h  · Maintain euglycemia, increase SSI    Platelet dysfunction (HCC)  Assessment & Plan  · In setting os asa use     · S/p DDAVP  · Continue to hold    Fall  Assessment & Plan  · Obtain orthostatics  · T/c Echo  · PT/OT/Geriatrics consult  · Fall precautions    [address]  · local wound care  · no underlying skull or facial bone fracture      ----------------------------------------------------------------------------------------  HPI/24hr events: required hydralazine for HTN  No change in neuro exam post admission  Disposition: Continue Critical Care   Code Status: Level 3 - DNAR and DNI  ---------------------------------------------------------------------------------------  SUBJECTIVE  none    Review of Systems   Respiratory: Negative for shortness of breath  Cardiovascular: Negative for chest pain  Neurological: Negative for dizziness, weakness and headaches  All other systems reviewed and are negative  Review of systems was reviewed and negative unless stated above in HPI/24-hour events   ---------------------------------------------------------------------------------------  OBJECTIVE    Vitals   Vitals:    21 0530 21 0600 21 0630 21 0733   BP: (!) 151/107 159/66 149/64 141/63   BP Location:    Right arm   Pulse: 63 59 56 64   Resp: (!) 43 (!) 30 (!) 28 22   Temp:    98 5 °F (36 9 °C)   TempSrc:    Oral   SpO2:  92% 96% 94%     Temp (24hrs), Av 8 °F (36 6 °C), Min:97 3 °F (36 3 °C), Max:98 5 °F (36 9 °C)  Current: Temperature: 98 5 °F (36 9 °C)          Respiratory:  SpO2: SpO2: 94 %       Invasive/non-invasive ventilation settings   Respiratory    Lab Data (Last 4 hours)    None         O2/Vent Data (Last 4 hours)    None                Physical Exam  Vitals reviewed  Constitutional:       Appearance: She is not toxic-appearing  HENT:      Head: Normocephalic  Comments: B/l periorbital ecchymosis     Mouth/Throat:      Mouth: Mucous membranes are dry  Eyes:      Pupils: Pupils are equal, round, and reactive to light  Cardiovascular:      Rate and Rhythm: Normal rate and regular rhythm     Pulmonary: Effort: Pulmonary effort is normal    Musculoskeletal:         General: Normal range of motion  Cervical back: Normal range of motion  Skin:     General: Skin is warm and dry  Capillary Refill: Capillary refill takes less than 2 seconds  Neurological:      Mental Status: She is alert  GCS: GCS eye subscore is 4  GCS verbal subscore is 5  GCS motor subscore is 6  Cranial Nerves: Cranial nerves are intact  Sensory: Sensation is intact  Motor: Motor function is intact  Psychiatric:         Attention and Perception: Attention normal          Laboratory and Diagnostics:  Results from last 7 days   Lab Units 06/28/21  0536 06/27/21  1558 06/27/21  1410   WBC Thousand/uL 8 91 8 57  --    HEMOGLOBIN g/dL 13 4 14 9  --    I STAT HEMOGLOBIN g/dl  --   --  14 3   HEMATOCRIT % 39 0 44 3  --    HEMATOCRIT, ISTAT %  --   --  42   PLATELETS Thousands/uL 248 287  --    NEUTROS PCT % 71 59  --    MONOS PCT % 11 8  --      Results from last 7 days   Lab Units 06/28/21  0536 06/27/21  1558   SODIUM mmol/L 141 138   POTASSIUM mmol/L 3 1* 3 7   CHLORIDE mmol/L 104 104   CO2 mmol/L 27 21   ANION GAP mmol/L 10 13   BUN mg/dL 11 17   CREATININE mg/dL 0 60 0 82   CALCIUM mg/dL 9 6 9 6   GLUCOSE RANDOM mg/dL 174* 263*   ALT U/L  --  30   AST U/L  --  22   ALK PHOS U/L  --  115   ALBUMIN g/dL  --  3 9   TOTAL BILIRUBIN mg/dL  --  0 25     Results from last 7 days   Lab Units 06/28/21  0536 06/27/21  1558   MAGNESIUM mg/dL 1 9 1 8   PHOSPHORUS mg/dL  --  2 9      Results from last 7 days   Lab Units 06/27/21  1558   INR  1 19      Results from last 7 days   Lab Units 06/27/21  2103 06/27/21  1834   TROPONIN I ng/mL <0 02 <0 02         ABG:    VBG:          Micro        EKG: nsr  Imaging: I have personally reviewed pertinent reports        Intake and Output  I/O       06/26 0701 - 06/27 0700 06/27 0701 - 06/28 0700 06/28 0701 - 06/29 0700    I V   496 7     IV Piggyback  250     Total Intake  746 7 Urine  600     Total Output  600     Net  +146 7            Unmeasured Urine Occurrence  3 x           Height and Weights         There is no height or weight on file to calculate BMI  Weight (last 2 days)     None            Nutrition       Diet Orders   (From admission, onward)             Start     Ordered    06/27/21 1650  Room Service  Once     Question:  Type of Service  Answer:  Room Service - Appropriate with Assistance    06/27/21 1649    06/27/21 1523  Diet NPO  Diet effective now     Question Answer Comment   Diet Type NPO    RD to adjust diet per protocol?  Yes        06/27/21 1522                  Active Medications  Scheduled Meds:  Current Facility-Administered Medications   Medication Dose Route Frequency Provider Last Rate    acetaminophen  975 mg Oral Q8H [facility] Ngozi Liao PA-C      amLODIPine  10 mg Oral Daily Ngozi Liao PA-C      hydrALAZINE  10 mg Intravenous Q6H PRN YA Logan      HYDROmorphone  0 2 mg Intravenous Q4H PRN Mari Coins, ADELINA      insulin lispro  1-5 Units Subcutaneous Q6H [facility] Ngozi Liao PA-C      levETIRAcetam  500 mg Intravenous Q12H [facility] Ngozi Liao PA-C 500 mg (06/27/21 1614)    levothyroxine  25 mcg Oral Daily Ngozi Liao PA-C      nitrofurantoin  100 mg Oral BID Ngozi Liao PA-C      ondansetron  4 mg Intravenous Q6H PRN Mari Coins, ADELINA      oxyCODONE  2 5 mg Oral Q4H PRN Mari Coins, ADELINA      oxyCODONE  5 mg Oral Q4H PRN Ngozi Lioa PA-C      potassium chloride  40 mEq Oral Once [place]       Continuous Infusions:     PRN Meds:   hydrALAZINE, 10 mg, Q6H PRN  HYDROmorphone, 0 2 mg, Q4H PRN  ondansetron, 4 mg, Q6H PRN  oxyCODONE, 2 5 mg, Q4H PRN  oxyCODONE, 5 mg, Q4H PRN        Invasive Devices Review  Invasive Devices     Peripheral Intravenous Line            Peripheral IV 06/27/21 Left Antecubital <1 day    Peripheral IV 06/27/21 Right Antecubital <1 day Drain            External Urinary Catheter <1 day                Rationale for remaining devices: n/a  ---------------------------------------------------------------------------------------  Advance Directive and Living Will:      Power of :    POLST:    ---------------------------------------------------------------------------------------  Care Time Delivered:   see attending attestation      Don Laboy PA-C      Portions of the record may have been created with voice recognition software  Occasional wrong word or "sound a like" substitutions may have occurred due to the inherent limitations of voice recognition software    Read the chart carefully and recognize, using context, where substitutions have occurred

## 2021-06-28 NOTE — ASSESSMENT & PLAN NOTE
Status post fall  Stable  Neurosurgery follows  Continue to hold anticoagulation  Monitor neuro checks

## 2021-06-29 ENCOUNTER — TELEPHONE (OUTPATIENT)
Dept: NEUROSURGERY | Facility: CLINIC | Age: 86
End: 2021-06-29

## 2021-06-29 DIAGNOSIS — I63.9 CVA (CEREBRAL VASCULAR ACCIDENT) (HCC): Chronic | ICD-10-CM

## 2021-06-29 LAB
ALBUMIN SERPL BCP-MCNC: 3.6 G/DL (ref 3.5–5)
ALP SERPL-CCNC: 92 U/L (ref 46–116)
ALT SERPL W P-5'-P-CCNC: 25 U/L (ref 12–78)
ANION GAP SERPL CALCULATED.3IONS-SCNC: 12 MMOL/L (ref 4–13)
AST SERPL W P-5'-P-CCNC: 27 U/L (ref 5–45)
BACTERIA UR QL AUTO: ABNORMAL /HPF
BASOPHILS # BLD AUTO: 0.04 THOUSANDS/ΜL (ref 0–0.1)
BASOPHILS NFR BLD AUTO: 1 % (ref 0–1)
BILIRUB SERPL-MCNC: 0.63 MG/DL (ref 0.2–1)
BILIRUB UR QL STRIP: NEGATIVE
BUN SERPL-MCNC: 20 MG/DL (ref 5–25)
CALCIUM SERPL-MCNC: 9.4 MG/DL (ref 8.3–10.1)
CHLORIDE SERPL-SCNC: 102 MMOL/L (ref 100–108)
CLARITY UR: CLEAR
CO2 SERPL-SCNC: 24 MMOL/L (ref 21–32)
COLOR UR: YELLOW
CREAT SERPL-MCNC: 0.65 MG/DL (ref 0.6–1.3)
EOSINOPHIL # BLD AUTO: 0.35 THOUSAND/ΜL (ref 0–0.61)
EOSINOPHIL NFR BLD AUTO: 5 % (ref 0–6)
ERYTHROCYTE [DISTWIDTH] IN BLOOD BY AUTOMATED COUNT: 12.5 % (ref 11.6–15.1)
GFR SERPL CREATININE-BSD FRML MDRD: 76 ML/MIN/1.73SQ M
GLUCOSE SERPL-MCNC: 191 MG/DL (ref 65–140)
GLUCOSE SERPL-MCNC: 199 MG/DL (ref 65–140)
GLUCOSE SERPL-MCNC: 235 MG/DL (ref 65–140)
GLUCOSE SERPL-MCNC: 248 MG/DL (ref 65–140)
GLUCOSE SERPL-MCNC: 274 MG/DL (ref 65–140)
GLUCOSE UR STRIP-MCNC: ABNORMAL MG/DL
HCT VFR BLD AUTO: 42.2 % (ref 34.8–46.1)
HGB BLD-MCNC: 14 G/DL (ref 11.5–15.4)
HGB UR QL STRIP.AUTO: NEGATIVE
IMM GRANULOCYTES # BLD AUTO: 0.03 THOUSAND/UL (ref 0–0.2)
IMM GRANULOCYTES NFR BLD AUTO: 0 % (ref 0–2)
KETONES UR STRIP-MCNC: ABNORMAL MG/DL
LEUKOCYTE ESTERASE UR QL STRIP: NEGATIVE
LYMPHOCYTES # BLD AUTO: 1.89 THOUSANDS/ΜL (ref 0.6–4.47)
LYMPHOCYTES NFR BLD AUTO: 25 % (ref 14–44)
MAGNESIUM SERPL-MCNC: 2 MG/DL (ref 1.6–2.6)
MCH RBC QN AUTO: 30.4 PG (ref 26.8–34.3)
MCHC RBC AUTO-ENTMCNC: 33.2 G/DL (ref 31.4–37.4)
MCV RBC AUTO: 92 FL (ref 82–98)
MONOCYTES # BLD AUTO: 0.85 THOUSAND/ΜL (ref 0.17–1.22)
MONOCYTES NFR BLD AUTO: 11 % (ref 4–12)
NEUTROPHILS # BLD AUTO: 4.36 THOUSANDS/ΜL (ref 1.85–7.62)
NEUTS SEG NFR BLD AUTO: 58 % (ref 43–75)
NITRITE UR QL STRIP: NEGATIVE
NON-SQ EPI CELLS URNS QL MICRO: ABNORMAL /HPF
NRBC BLD AUTO-RTO: 0 /100 WBCS
PH UR STRIP.AUTO: 6 [PH]
PHOSPHATE SERPL-MCNC: 2.5 MG/DL (ref 2.3–4.1)
PLATELET # BLD AUTO: 241 THOUSANDS/UL (ref 149–390)
PMV BLD AUTO: 9.2 FL (ref 8.9–12.7)
POTASSIUM SERPL-SCNC: 4.3 MMOL/L (ref 3.5–5.3)
PROT SERPL-MCNC: 7.5 G/DL (ref 6.4–8.2)
PROT UR STRIP-MCNC: NEGATIVE MG/DL
RBC # BLD AUTO: 4.6 MILLION/UL (ref 3.81–5.12)
RBC #/AREA URNS AUTO: ABNORMAL /HPF
SODIUM SERPL-SCNC: 138 MMOL/L (ref 136–145)
SP GR UR STRIP.AUTO: 1.02 (ref 1–1.03)
UROBILINOGEN UR QL STRIP.AUTO: 0.2 E.U./DL
WBC # BLD AUTO: 7.52 THOUSAND/UL (ref 4.31–10.16)
WBC #/AREA URNS AUTO: ABNORMAL /HPF

## 2021-06-29 PROCEDURE — 84100 ASSAY OF PHOSPHORUS: CPT | Performed by: PHYSICIAN ASSISTANT

## 2021-06-29 PROCEDURE — 80053 COMPREHEN METABOLIC PANEL: CPT | Performed by: PHYSICIAN ASSISTANT

## 2021-06-29 PROCEDURE — 99232 SBSQ HOSP IP/OBS MODERATE 35: CPT | Performed by: FAMILY MEDICINE

## 2021-06-29 PROCEDURE — 85025 COMPLETE CBC W/AUTO DIFF WBC: CPT | Performed by: PHYSICIAN ASSISTANT

## 2021-06-29 PROCEDURE — 83735 ASSAY OF MAGNESIUM: CPT | Performed by: PHYSICIAN ASSISTANT

## 2021-06-29 PROCEDURE — 99233 SBSQ HOSP IP/OBS HIGH 50: CPT | Performed by: SURGERY

## 2021-06-29 PROCEDURE — 82948 REAGENT STRIP/BLOOD GLUCOSE: CPT

## 2021-06-29 PROCEDURE — 81001 URINALYSIS AUTO W/SCOPE: CPT | Performed by: NURSE PRACTITIONER

## 2021-06-29 RX ORDER — ATORVASTATIN CALCIUM 40 MG/1
TABLET, FILM COATED ORAL
Qty: 90 TABLET | Refills: 1 | Status: SHIPPED | OUTPATIENT
Start: 2021-06-29 | End: 2021-06-30 | Stop reason: HOSPADM

## 2021-06-29 RX ORDER — ASPIRIN 81 MG
TABLET,CHEWABLE ORAL
Qty: 90 TABLET | Refills: 1 | Status: SHIPPED | OUTPATIENT
Start: 2021-06-29 | End: 2021-06-30 | Stop reason: HOSPADM

## 2021-06-29 RX ORDER — MIRTAZAPINE 15 MG/1
7.5 TABLET, FILM COATED ORAL
Status: DISCONTINUED | OUTPATIENT
Start: 2021-06-29 | End: 2021-06-30 | Stop reason: HOSPADM

## 2021-06-29 RX ORDER — ESCITALOPRAM OXALATE 10 MG/1
5 TABLET ORAL DAILY
Status: DISCONTINUED | OUTPATIENT
Start: 2021-06-29 | End: 2021-06-30 | Stop reason: HOSPADM

## 2021-06-29 RX ORDER — LANOLIN ALCOHOL/MO/W.PET/CERES
3 CREAM (GRAM) TOPICAL
Status: DISCONTINUED | OUTPATIENT
Start: 2021-06-29 | End: 2021-06-30 | Stop reason: HOSPADM

## 2021-06-29 RX ADMIN — LEVETIRACETAM 500 MG: 500 TABLET ORAL at 22:13

## 2021-06-29 RX ADMIN — Medication 3 MG: at 22:14

## 2021-06-29 RX ADMIN — ACETAMINOPHEN 975 MG: 325 TABLET, FILM COATED ORAL at 13:44

## 2021-06-29 RX ADMIN — HEPARIN SODIUM 5000 UNITS: 5000 INJECTION INTRAVENOUS; SUBCUTANEOUS at 13:41

## 2021-06-29 RX ADMIN — HEPARIN SODIUM 5000 UNITS: 5000 INJECTION INTRAVENOUS; SUBCUTANEOUS at 22:15

## 2021-06-29 RX ADMIN — LEVOTHYROXINE SODIUM 25 MCG: 25 TABLET ORAL at 05:32

## 2021-06-29 RX ADMIN — INSULIN LISPRO 3 UNITS: 100 INJECTION, SOLUTION INTRAVENOUS; SUBCUTANEOUS at 17:32

## 2021-06-29 RX ADMIN — HEPARIN SODIUM 5000 UNITS: 5000 INJECTION INTRAVENOUS; SUBCUTANEOUS at 05:29

## 2021-06-29 RX ADMIN — ACETAMINOPHEN 975 MG: 325 TABLET, FILM COATED ORAL at 22:13

## 2021-06-29 RX ADMIN — MIRTAZAPINE 7.5 MG: 15 TABLET, FILM COATED ORAL at 22:14

## 2021-06-29 RX ADMIN — ACETAMINOPHEN 975 MG: 325 TABLET, FILM COATED ORAL at 05:29

## 2021-06-29 RX ADMIN — INSULIN LISPRO 3 UNITS: 100 INJECTION, SOLUTION INTRAVENOUS; SUBCUTANEOUS at 13:42

## 2021-06-29 NOTE — PLAN OF CARE
Problem: Potential for Falls  Goal: Patient will remain free of falls  Description: INTERVENTIONS:  - Educate patient/family on patient safety including physical limitations  - Instruct patient to call for assistance with activity   - Consult OT/PT to assist with strengthening/mobility   - Keep Call bell within reach  - Keep bed low and locked with side rails adjusted as appropriate  - Keep care items and personal belongings within reach  - Initiate and maintain comfort rounds  - Make Fall Risk Sign visible to staff  - Offer Toileting every Hours, in advance of need  - Initiate/Maintain alarm  - Obtain necessary fall risk management equipment:  - Apply yellow socks and bracelet for high fall risk patients  - Consider moving patient to room near nurses station  Outcome: Progressing     Problem: MOBILITY - ADULT  Goal: Maintain or return to baseline ADL function  Description: INTERVENTIONS:  -  Assess patient's ability to carry out ADLs; assess patient's baseline for ADL function and identify physical deficits which impact ability to perform ADLs (bathing, care of mouth/teeth, toileting, grooming, dressing, etc )  - Assess/evaluate cause of self-care deficits   - Assess range of motion  - Assess patient's mobility; develop plan if impaired  - Assess patient's need for assistive devices and provide as appropriate  - Encourage maximum independence but intervene and supervise when necessary  - Involve family in performance of ADLs  - Assess for home care needs following discharge   - Consider OT consult to assist with ADL evaluation and planning for discharge  - Provide patient education as appropriate  Outcome: Progressing  Goal: Maintains/Returns to pre admission functional level  Description: INTERVENTIONS:  - Perform BMAT or MOVE assessment daily    - Set and communicate daily mobility goal to care team and patient/family/caregiver     - Collaborate with rehabilitation services on mobility goals if consulted  - Perform Range of Motion 3 times a day  - Reposition patient every 2 hours  - Dangle patient 3 times a day  - Stand patient 3 times a day  - Ambulate patient 3 times a day  - Out of bed to chair 3 times a day   - Out of bed for meals 3 times a day  - Out of bed for toileting  - Record patient progress and toleration of activity level   Outcome: Progressing     Problem: Prexisting or High Potential for Compromised Skin Integrity  Goal: Skin integrity is maintained or improved  Description: INTERVENTIONS:  - Identify patients at risk for skin breakdown  - Assess and monitor skin integrity  - Assess and monitor nutrition and hydration status  - Monitor labs   - Assess for incontinence   - Turn and reposition patient  - Assist with mobility/ambulation  - Relieve pressure over bony prominences  - Avoid friction and shearing  - Provide appropriate hygiene as needed including keeping skin clean and dry  - Evaluate need for skin moisturizer/barrier cream  - Collaborate with interdisciplinary team   - Patient/family teaching  - Consider wound care consult   Outcome: Progressing     Problem: Nutrition/Hydration-ADULT  Goal: Nutrient/Hydration intake appropriate for improving, restoring or maintaining nutritional needs  Description: Monitor and assess patient's nutrition/hydration status for malnutrition  Collaborate with interdisciplinary team and initiate plan and interventions as ordered  Monitor patient's weight and dietary intake as ordered or per policy  Utilize nutrition screening tool and intervene as necessary  Determine patient's food preferences and provide high-protein, high-caloric foods as appropriate       INTERVENTIONS:  - Monitor oral intake, urinary output, labs, and treatment plans  - Assess nutrition and hydration status and recommend course of action  - Evaluate amount of meals eaten  - Assist patient with eating if necessary   - Allow adequate time for meals  - Recommend/ encourage appropriate diets, oral nutritional supplements, and vitamin/mineral supplements  - Order, calculate, and assess calorie counts as needed  - Recommend, monitor, and adjust tube feedings and TPN/PPN based on assessed needs  - Assess need for intravenous fluids  - Provide specific nutrition/hydration education as appropriate  - Include patient/family/caregiver in decisions related to nutrition  Outcome: Progressing

## 2021-06-29 NOTE — ASSESSMENT & PLAN NOTE
· Neuro exam: GCS 15, non-focal  · Continue neurologic checks: Every 1 hours  · Reversal agent administered: K-Centra and DDAVP  · CT scan of the head on 6/27 reviewed:  Left parafalcine subdural hematoma  · Repeat head CT on 6/28 displayed stability  · follow-up Neurosurgery consulted and has since signed off  · Complete 7 day course of Keppra for seizure prophylaxis  · Chemical DVT prophylaxis:  Heparin  · Hold all anticoagulants and anti platelet medications for 2 weeks and/or until cleared by Neurosurgery to resume  · PT and OT (including cognitive evaluation) evaluation and treatment as indicated    · Follow-up with Neurosurgery as an outpatient with repeat head CT imaging

## 2021-06-29 NOTE — ASSESSMENT & PLAN NOTE
· Currently sinus  · Not on antiarrhytmic therapy as outpatient  · Continue to hold Eliquis/ASA in setting Subdural  · Follow-up in 2 weeks with Neurosurgery for repeat imaging prior to restarting Eliquis

## 2021-06-29 NOTE — QUICK NOTE
Repeat CT head without contrast demonstrates stable parafalcine and tentorial SDH  No new hemorrhage  Fom NSx perspective no procedure is anticipated  We recommend :    1  F/U at OP NSx clinic in two weeks with CT head without contrast  2  No AC/AP for two weeks  3  Seizure PPx-per trauma  4  Fall precaution  5  S/O  6   Call for question or concern

## 2021-06-29 NOTE — PHYSICAL THERAPY NOTE
Physical Therapy Cancellation Note             06/29/21 0910   PT Last Visit   PT Visit Date 06/29/21   Note Type   Cancel Reasons Other   Assessment   Assessment pt is refusing PT intervention today stating she has a very bad headache and that she no longer wants to suffer and just wishes she could die  pt was educated on the importance of getting OOB and being more active and pt continues to refuse PT intervention   RN Hayley Damon was made aware of comments pt said pertaining to wanted to die and no longer suffer          Vamshi Course, PTA

## 2021-06-29 NOTE — PROGRESS NOTES
[facility]  Progress Note - Heather Lawrence 4/7/1927, 80 y o  female MRN: [id number]  Unit/Bed#: S -01 Encounter: [id number]  Primary Care Provider: YA De La Cruz   Date and time admitted to hospital: 6/27/2021  2:06 PM    [address]  · Injuries listed below  · PT/OT/Geriatrics consult  · Fall precautions    * Subdural hematoma (HCC)  Assessment & Plan  · Neuro exam: GCS 15, non-focal  · Continue neurologic checks: Every 1 hours  · Reversal agent administered: K-Centra and DDAVP  · CT scan of the head on 6/27 reviewed:  Left parafalcine subdural hematoma  · Repeat head CT on 6/28 displayed stability  · follow-up Neurosurgery consulted and has since signed off  · Complete 7 day course of Keppra for seizure prophylaxis  · Chemical DVT prophylaxis:  Heparin  · Hold all anticoagulants and anti platelet medications for 2 weeks and/or until cleared by Neurosurgery to resume  · PT and OT (including cognitive evaluation) evaluation and treatment as indicated  · Follow-up with Neurosurgery as an outpatient with repeat head CT imaging      Syncope  Assessment & Plan  · Admission it was reported patient became dizzy and then fell out of bed after getting up from a nap  Daughter suspects this may be because she did not have her walker nearby however is concerned that something may have caused her to possibly pass out and fall  · Telemetry unremarkable over the past 24 hours  · Repeat orthostatic vital signs today  Urinary frequency  Assessment & Plan  · UA ordered  · Abdomen/pelvis is soft nontender  · Patient not taking Macrobid at home  Dc'd  Depression  Assessment & Plan  · Patient has expressed feelings of depression  · Per geriatrics recommendations will start Lexapro 5 mg  Other insomnia  Assessment & Plan  · Started melatonin 3 mg HS, per geriatrics recommendations      Hypokalemia  Assessment & Plan  · AM Labs pending      History of atrial fibrillation  Assessment & Plan  · Currently sinus  · Not on antiarrhytmic therapy as outpatient  · Continue to hold Eliquis/ASA in setting Subdural  · Follow-up in 2 weeks with Neurosurgery for repeat imaging prior to restarting Eliquis  History of diabetes mellitus  Assessment & Plan  · Uncontrolled DM  A1C 10 2 on 6/9/21  · Home regimen: Metformin/Glimeperide  · ISS ACHS  · Maintain euglycemia, increase SSI    Coagulopathy (HCC)  Assessment & Plan  · 2/2 Eliquis for afib  · S/p DDAVP/Kcentra  · Continue to hold Eliquis    Platelet dysfunction ([address] )  Assessment & Plan  · In setting asa use  · S/p DDAVP  · Continue to hold for 2 weeks until follow-up with Neurosurgery  Cephalohematoma  Assessment & Plan  · local wound care  · no underlying skull or facial bone fracture  · Multimodal pain regimen  Ice  Other specified hypothyroidism  Assessment & Plan  · Continue levothyroxine    Essential hypertension  Assessment & Plan  · Continue home amlodipine        TERTIARY TRAUMA SURVEY NOTE    Prophylaxis: Sequential compression device (Venodyne)  and Heparin    Disposition:  PT/OT recommended rehab  Will discuss with family and case management  Code status:  Level 3 - DNAR and DNI    Consultants:  PT/OT, geriatrics, neurosurgery  Is the patient 72 years or older?: YES:    1  Before the illness or injury that brought you to the Emergency, did you need someone to help you on a regular basis? 1=Yes   2  Since the illness or injury that brought you to the Emergency, have you needed more help than usual to take care of yourself? 1=Yes   3  Have you been hospitalized for one or more nights during the past 6 months (excluding a stay in the Emergency Department)? 0=No   4  In general, do you see well? 0=Yes   5  In general, do you have serious problems with your memory? 1=Yes   6  Do you take more than three different medications everyday?  1=Yes   TOTAL   4     Did you order a geriatric consult if the score was 2 or greater?: yes          SUBJECTIVE:     Tertiary Exam Due on: 6/29/2021    Mechanism of Injury:Fall    Details related to Injury: +LOC:  unknown    Chief Complaint:  I am tired    HPI/Last 24 hour events: This is a 80-year-old female with history of atrial fibrillation, s/p possible syncopal fall from home while getting out of bed  She was found to have a large forehead subcutaneous hematoma as well as a small acute left anterior parafalcine subdural hematoma  She was reversed with Kcentra and DDAVP for history of Eliquis and aspirin use  Repeat imaging of her head showed stability of SDH  Upon my evaluation this morning, patient complains of being tired and getting minimal sleep overnight  She also notes having feelings of being sad/depressed and not wanting to be here  She denies SI   Nursing staff notes that patient has been having urinary frequency overnight  Patient denies any other urinary symptoms  She states that her Ceferino Deems have improved and she is no longer having symptoms  She does continue to complain of headache/facial pain secondary to contusion  She denies any feelings of dizziness, lightheadedness, shortness of breath, difficulty breathing, abdominal pain, nausea, vomiting  She confirms that she has tolerated an oral diet last night  A 12 point review of systems was completed and is negative not otherwise mentioned above      Active medications:           Current Facility-Administered Medications:     acetaminophen (TYLENOL) tablet 975 mg, 975 mg, Oral, Q8H FRANCESCA, 975 mg at 06/29/21 0529    amLODIPine (NORVASC) tablet 10 mg, 10 mg, Oral, Daily, 10 mg at 06/28/21 0815    escitalopram (LEXAPRO) tablet 5 mg, 5 mg, Oral, Daily    heparin (porcine) subcutaneous injection 5,000 Units, 5,000 Units, Subcutaneous, Q8H [address], 5,000 Units at 06/29/21 0529    hydrALAZINE (APRESOLINE) injection 10 mg, 10 mg, Intravenous, Q6H PRN, 10 mg at 06/27/21 1827    HYDROmorphone (DILAUDID) injection 0 2 mg, 0 2 mg, Intravenous, Q4H PRN, 0 2 mg at 06/27/21 1645    insulin lispro (HumaLOG) 100 units/mL subcutaneous injection 1-5 Units, 1-5 Units, Subcutaneous, TID With Meals **AND** Fingerstick Glucose (POCT), , , Q6H    levETIRAcetam (KEPPRA) tablet 500 mg, 500 mg, Oral, Q12H FRANCESCA, 500 mg at 06/28/21 2154    levothyroxine tablet 25 mcg, 25 mcg, Oral, Daily, 25 mcg at 06/29/21 0532    melatonin tablet 3 mg, 3 mg, Oral, HS    ondansetron (ZOFRAN) injection 4 mg, 4 mg, Intravenous, Q6H PRN, 4 mg at 06/27/21 1705    oxyCODONE (ROXICODONE) IR tablet 2 5 mg, 2 5 mg, Oral, Q4H PRN    oxyCODONE (ROXICODONE) IR tablet 5 mg, 5 mg, Oral, Q4H PRN      OBJECTIVE:     Vitals:   Vitals:    06/29/21 0331   BP: 156/71   Pulse: 56   Resp:    Temp: 97 6 °F (36 4 °C)   SpO2: 91%       Physical Exam:   GENERAL APPEARANCE:  No acute distress  NEURO:  GCS 15  Patient is oriented to person, general place, general time, general situation  Patient's cognition seems to be questionable at times, but with repeat clear fine questions she does display orientation my exam     HEENT:  Large forehead contusion with forehead ecchymosis, swelling, periorbital ecchymosis  Neck is supple  CV:  Regular rate and rhythm  No murmur, no rub, no gallop  +2 radial and dorsalis pedis pulses, bilaterally  LUNGS:  Clear to auscultation, bilaterally  No wheezing, no rhonchi, no rales  GI:  Abdomen is soft and nontender  Bowel sounds are present  :  Pelvis stable and nontender  MSK:  Patient moving extremities with no focal weakness or deformities noted  No C, T, L-spine tenderness or step-offs noted  SKIN:  Warm, dry, well perfused  I/O:   I/O       06/27 0701 - 06/28 0700 06/28 0701 - 06/29 0700    P  O   0    I V  496 7 30    IV Piggyback 250 100    Total Intake 746 7 130    Urine 600 300    Total Output 600 300    Net +146 7 -170          Unmeasured Urine Occurrence 3 x           Invasive Devices:    Invasive Devices     Peripheral Intravenous Line            Peripheral IV 06/27/21 Right Antecubital 1 day          Drain            External Urinary Catheter 1 day                  Imaging:   CT head wo contrast    Result Date: 6/28/2021  Impression: 1  Large frontal subcutaneous hematoma has slightly diminished in size  2  The left parafalcine subdural hematoma, the tiny left posterior subdural hematoma, and the trace amount of blood along the tentorium demonstrate no progression from prior  No new areas of hemorrhage  3  No mass effect or midline shift  I personally discussed this study with Dr Viv Rivero on 6/28/2021 at 2:14 PM  Workstation performed: [id number]     TRAUMA - CT head wo contrast    Result Date: 6/27/2021  Impression: Large forehead subcutaneous hematoma without associated calvarial fracture  There is a small acute left anterior parafalcine subdural hematoma (series 2 image 34 ) I personally discussed this study with Alexey Bray on 6/27/2021 at 2:57 PM   Workstation performed: [id number]     TRAUMA - CT facial bones wo contrast    Result Date: 6/27/2021  Impression: Large forehead subcutaneous hematoma, without associated facial bone fracture  Chronic paranasal sinus disease with also thickening in both maxillary sinuses and sphenoid sinuses  Air-fluid level in the right maxillary sinus could represent superimposed acute sinusitis  I personally discussed this study with Alexey Bray on 6/27/2021 at 2:57 PM  Workstation performed: [id number]     TRAUMA - CT spine cervical wo contrast    Result Date: 6/27/2021  Impression: No cervical spine fracture or traumatic malalignment  I personally discussed this study with Alexey Bray on 6/27/2021 at 2:57 PM   Workstation performed: [id number]     XR chest 1 view    Result Date: 6/28/2021  Impression: No acute cardiopulmonary disease within limitations of supine imaging   Workstation performed: [id number]     TRAUMA - CT chest abdomen pelvis w contrast    Result Date: 6/27/2021  Impression: No evidence of acute traumatic injury to the chest, abdomen, and pelvis  I personally discussed this study with Debi Houser on 6/27/2021 at 2:57 PM  Workstation performed: [id number]     XR Trauma multiple (SLB/SLRA trauma bay ONLY)    Result Date: 6/27/2021  Impression: No acute cardiopulmonary disease within limitations of supine imaging  Workstation performed: [id number]       Labs:   CBC:   Lab Results   Component Value Date    WBC 7 52 06/29/2021    HGB 14 0 06/29/2021    HCT 42 2 06/29/2021    MCV 92 06/29/2021     06/29/2021    MCH 30 4 06/29/2021    MCHC 33 2 06/29/2021    RDW 12 5 06/29/2021    MPV 9 2 06/29/2021    NRBC 0 06/29/2021     CMP: No results found for: NA, CL, CO2, ANIONGAP, BUN, CREATININE, GLUCOSE, CALCIUM, AST, ALT, ALKPHOS, PROT, BILITOT, EGFR  Radiology review: CT head wo contrast    Result Date: 6/28/2021  Impression: 1  Large frontal subcutaneous hematoma has slightly diminished in size  2  The left parafalcine subdural hematoma, the tiny left posterior subdural hematoma, and the trace amount of blood along the tentorium demonstrate no progression from prior  No new areas of hemorrhage  3  No mass effect or midline shift  I personally discussed this study with Dr Tobe Severs on 6/28/2021 at 2:14 PM  Workstation performed: [id number]     TRAUMA - CT head wo contrast    Result Date: 6/27/2021  Impression: Large forehead subcutaneous hematoma without associated calvarial fracture  There is a small acute left anterior parafalcine subdural hematoma (series 2 image 34 ) I personally discussed this study with Debi Houser on 6/27/2021 at 2:57 PM   Workstation performed: [id number]     TRAUMA - CT facial bones wo contrast    Result Date: 6/27/2021  Impression: Large forehead subcutaneous hematoma, without associated facial bone fracture   Chronic paranasal sinus disease with also thickening in both maxillary sinuses and sphenoid sinuses  Air-fluid level in the right maxillary sinus could represent superimposed acute sinusitis  I personally discussed this study with Pepe Warner on 6/27/2021 at 2:57 PM  Workstation performed: [id number]     TRAUMA - CT spine cervical wo contrast    Result Date: 6/27/2021  Impression: No cervical spine fracture or traumatic malalignment  I personally discussed this study with Pepe Warner on 6/27/2021 at 2:57 PM   Workstation performed: [id number]     XR chest 1 view    Result Date: 6/28/2021  Impression: No acute cardiopulmonary disease within limitations of supine imaging  Workstation performed: [id number]     TRAUMA - CT chest abdomen pelvis w contrast    Result Date: 6/27/2021  Impression: No evidence of acute traumatic injury to the chest, abdomen, and pelvis  I personally discussed this study with Pepe Warner on 6/27/2021 at 2:57 PM  Workstation performed: [id number]     XR Trauma multiple (SLB/SLRA trauma bay ONLY)    Result Date: 6/27/2021  Impression: No acute cardiopulmonary disease within limitations of supine imaging   Workstation performed: [id number]

## 2021-06-29 NOTE — CASE MANAGEMENT
LOS: 1 DAY  PATIENT IS NOT A BUNDLE  PATIENT IS NOT A READMISSION  UNPLANNED RISK OF READMISSION: 14      CM met with the Patient at the bedside; Patient was confused and teary  Patient is unable to complete CM assessment at this time  CM spoke with the Patient's daughter, Ltanya Krabbe, via phone  CM introduced self and role  Patient currently resides with her daughter in a multi-level home  Patient has a single floor setup on the Patient's lower level; Patient's daughter states there are several steps to get to that living level  Prior to admission, Patient required assistance with bathing, but was able to dress independently  Patient utilized a RW to ambulate  Patient has been to [facility] in the past  Patient has had no VNA history  [facility] utilizes [facility] [place] and is able to afford most of her medications, but struggles to afford her Eliquis  Patient's daughter reports that the Patient got PACE, but this did not make the Eliquis affordable and Patient still has a copay of $100  CM encouraged the Patient's daughter to disucss this with the prescribing provider to see if there are any alternatives that may be more affordable  Patient's daughter identifies herself as the Patient's health care representative  No ACP documents on file; CM requested a copy of same  Patient sees her PCP, Dr Nino Jackson, regularly  Patient does not drive and her daughter provides all transportation needs  CM reviewed the recommendation for STR upon dc  Patient's daughter states that she understands the recommendation and wants what is best for the Patient  Patient's daughter has no preference in facility at this time, but prefers that the facility be closer to home, as [facility] was too far  CM reviewed that referrals would be placed to [place] facilities and CM dept will follow up to review accepting facilities and their preferences  CM sent referral for STR to -[facility], [facility], and [facility] via [facility]   CM dept will follow for accepting facility and review choices with Patient and family  CM reviewed discharge planning process including the following: identifying caregivers at home, preference for d/c planning needs, availability of treatment team to discuss questions or concerns patient and/or family may have regarding diagnosis, plan of care, old or new medications and discharge planning   CM will continue to follow for care coordination and update assessment as appropriate

## 2021-06-29 NOTE — ASSESSMENT & PLAN NOTE
· Uncontrolled DM   A1C 10 2 on 6/9/21  · Home regimen: Metformin/Glimeperide  · ISS ACHS  · Maintain euglycemia, increase SSI

## 2021-06-29 NOTE — TELEPHONE ENCOUNTER
7/7/21:  PAULINE PHONED AND SCHEDULED IMAGING  (7/12/21)    7/6/21:  LEFT ANOTHER MESSAGE FOR PAULINE WITH APPT INFORMATION AND THAT CT HEAD IS REQUIRED BEFORE  LEFT EXT: 6000 & 1000     7/1/21:   PATIENT DISCHARGED TO [facility] #[phone]  LM FOR PAULINE WITH OV/IMAGING INFORMATION - REQ CALL BACK TO CONFIRM        6/29/21: PATIENT IN HOSPITAL    TODAYS DATE:  6/29/21  EMAIL FROM PA:  Gaviota Ayala OF EMAIL:   6/28/21    HOSPITAL FOLLOW UP   2 WEEK JOSE/ CARI  7/15/21 / 10:00 / Tamera Abdi    IMAGING:     CT HEAD - NO AUTH

## 2021-06-29 NOTE — ASSESSMENT & PLAN NOTE
· In setting asa use  · S/p DDAVP  · Continue to hold for 2 weeks until follow-up with Neurosurgery

## 2021-06-29 NOTE — ASSESSMENT & PLAN NOTE
· Admission it was reported patient became dizzy and then fell out of bed after getting up from a nap  Daughter suspects this may be because she did not have her walker nearby however is concerned that something may have caused her to possibly pass out and fall  · Telemetry unremarkable over the past 24 hours  · Repeat orthostatic vital signs today  · ECHO?

## 2021-06-29 NOTE — ASSESSMENT & PLAN NOTE
· Patient has expressed feelings of depression  · Per geriatrics recommendations will start Lexapro 5 mg

## 2021-06-30 VITALS
HEART RATE: 53 BPM | RESPIRATION RATE: 14 BRPM | OXYGEN SATURATION: 92 % | DIASTOLIC BLOOD PRESSURE: 60 MMHG | TEMPERATURE: 97.6 F | SYSTOLIC BLOOD PRESSURE: 135 MMHG

## 2021-06-30 LAB
GLUCOSE SERPL-MCNC: 211 MG/DL (ref 65–140)
GLUCOSE SERPL-MCNC: 225 MG/DL (ref 65–140)
GLUCOSE SERPL-MCNC: 337 MG/DL (ref 65–140)
SARS-COV-2 RNA RESP QL NAA+PROBE: NEGATIVE

## 2021-06-30 PROCEDURE — U0003 INFECTIOUS AGENT DETECTION BY NUCLEIC ACID (DNA OR RNA); SEVERE ACUTE RESPIRATORY SYNDROME CORONAVIRUS 2 (SARS-COV-2) (CORONAVIRUS DISEASE [COVID-19]), AMPLIFIED PROBE TECHNIQUE, MAKING USE OF HIGH THROUGHPUT TECHNOLOGIES AS DESCRIBED BY CMS-2020-01-R: HCPCS | Performed by: PHYSICIAN ASSISTANT

## 2021-06-30 PROCEDURE — 82948 REAGENT STRIP/BLOOD GLUCOSE: CPT

## 2021-06-30 PROCEDURE — NC001 PR NO CHARGE: Performed by: SURGERY

## 2021-06-30 PROCEDURE — 99238 HOSP IP/OBS DSCHRG MGMT 30/<: CPT | Performed by: SURGERY

## 2021-06-30 PROCEDURE — U0005 INFEC AGEN DETEC AMPLI PROBE: HCPCS | Performed by: PHYSICIAN ASSISTANT

## 2021-06-30 RX ORDER — ACETAMINOPHEN 325 MG/1
975 TABLET ORAL EVERY 8 HOURS SCHEDULED
Refills: 0 | Status: ON HOLD
Start: 2021-06-30 | End: 2021-12-02 | Stop reason: SDUPTHER

## 2021-06-30 RX ORDER — MIRTAZAPINE 7.5 MG/1
7.5 TABLET, FILM COATED ORAL
Refills: 0
Start: 2021-06-30 | End: 2021-07-28 | Stop reason: SDUPTHER

## 2021-06-30 RX ORDER — LEVETIRACETAM 500 MG/1
500 TABLET ORAL EVERY 12 HOURS SCHEDULED
Qty: 9 TABLET | Refills: 0
Start: 2021-06-30 | End: 2021-07-09 | Stop reason: ALTCHOICE

## 2021-06-30 RX ORDER — ESCITALOPRAM OXALATE 5 MG/1
5 TABLET ORAL DAILY
Refills: 0
Start: 2021-07-01 | End: 2021-07-09 | Stop reason: ALTCHOICE

## 2021-06-30 RX ORDER — LANOLIN ALCOHOL/MO/W.PET/CERES
3 CREAM (GRAM) TOPICAL
Refills: 0
Start: 2021-06-30 | End: 2021-12-02 | Stop reason: HOSPADM

## 2021-06-30 RX ADMIN — LEVOTHYROXINE SODIUM 25 MCG: 25 TABLET ORAL at 05:20

## 2021-06-30 RX ADMIN — HEPARIN SODIUM 5000 UNITS: 5000 INJECTION INTRAVENOUS; SUBCUTANEOUS at 05:20

## 2021-06-30 RX ADMIN — INSULIN LISPRO 4 UNITS: 100 INJECTION, SOLUTION INTRAVENOUS; SUBCUTANEOUS at 12:45

## 2021-06-30 RX ADMIN — INSULIN LISPRO 2 UNITS: 100 INJECTION, SOLUTION INTRAVENOUS; SUBCUTANEOUS at 08:35

## 2021-06-30 RX ADMIN — ACETAMINOPHEN 975 MG: 325 TABLET, FILM COATED ORAL at 13:57

## 2021-06-30 RX ADMIN — ACETAMINOPHEN 975 MG: 325 TABLET, FILM COATED ORAL at 05:20

## 2021-06-30 RX ADMIN — AMLODIPINE BESYLATE 10 MG: 10 TABLET ORAL at 08:31

## 2021-06-30 RX ADMIN — HEPARIN SODIUM 5000 UNITS: 5000 INJECTION INTRAVENOUS; SUBCUTANEOUS at 13:57

## 2021-06-30 RX ADMIN — LEVETIRACETAM 500 MG: 500 TABLET ORAL at 08:31

## 2021-06-30 RX ADMIN — ESCITALOPRAM OXALATE 5 MG: 10 TABLET ORAL at 08:30

## 2021-06-30 NOTE — ASSESSMENT & PLAN NOTE
· Currently sinus  · Not on antiarrhytmic therapy as outpatient  · Continue to hold Eliquis/ASA in setting Subdural hematoma  · Follow-up in 2 weeks with Neurosurgery for repeat imaging prior to restarting Eliquis

## 2021-06-30 NOTE — PROGRESS NOTES
[facility]  Progress Note - Raghav Senior 4/7/1927, 80 y o  female MRN: [id number]  Unit/Bed#: S -01 Encounter: [id number]  Primary Care Provider: YA Rutledge   Date and time admitted to hospital: 6/27/2021  2:06 PM    Urinary frequency  Assessment & Plan  · UA negative and patient denies symptoms other than frequency  · Abdomen/pelvis is soft nontender  · Patient not taking Macrobid at home  Dc'd  Depression  Assessment & Plan  · Patient has expressed feelings of depression  · Per geriatrics recommendations will start Lexapro 5 mg  · Patient is feeling better this morning and denies any negative thoughts    Other insomnia  Assessment & Plan  · Started melatonin 3 mg HS, per geriatrics recommendations  · Improved     Hypokalemia  Assessment & Plan  · resolved  · 4 3 on 6/29    Syncope  Assessment & Plan  · Admission it was reported patient became dizzy and then fell out of bed after getting up from a nap  Daughter suspects this may be because she did not have her walker nearby however is concerned that something may have caused her to possibly pass out and fall  · Telemetry unremarkable over the past 24 hours  · Repeat orthostatic vital signs today  · EKG- unchanged   · Troponin (-) x2 on admission    History of atrial fibrillation  Assessment & Plan  · Currently sinus  · Not on antiarrhytmic therapy as outpatient  · Continue to hold Eliquis/ASA in setting Subdural hematoma  · Follow-up in 2 weeks with Neurosurgery for repeat imaging prior to restarting Eliquis  History of diabetes mellitus  Assessment & Plan  · Uncontrolled DM  A1C 10 2 on 6/9/21  · Home regimen: Metformin/Glimeperide  · ISS ACHS  · Maintain euglycemia, increase SSI    Coagulopathy (HCC)  Assessment & Plan  · 2/2 Eliquis for afib and ASA  · S/p DDAVP/Kcentra  · Continue to hold Eliquis/ASA    Platelet dysfunction ([facility] [address] )  Assessment & Plan  · In setting asa use     · S/p DDAVP  · Continue to hold for 2 weeks until follow-up with Neurosurgery  Fall  Assessment & Plan  · Injuries listed below  · PT/OT-pending  · Geriatrics consult appreciated  · Fall precautions    [address]  · local wound care  · no underlying skull or facial bone fracture  · Multimodal pain regimen  Ice  · Resolving    Other specified hypothyroidism  Assessment & Plan  · Continue levothyroxine  · Follow-up PCP    Essential hypertension  Assessment & Plan  · Continue home amlodipine  · BP stable  · Follow-up PCP    * Subdural hematoma (HCC)  Assessment & Plan  · Neuro exam: GCS 15, non-focal  · Continue neurologic checks: Every 4 hours  · Reversal agent administered: K-Centra and DDAVP  · CT scan of the head on 6/27 reviewed:  Left parafalcine subdural hematoma  · Repeat head CT on 6/28 displayed stability  · follow-up Neurosurgery consulted and has since signed off  · Complete 7 day course of Keppra for seizure prophylaxis  · Chemical DVT prophylaxis:  Heparin  · Hold all anticoagulants and anti platelet medications for 2 weeks and/or until cleared by Neurosurgery to resume  · PT and OT (including cognitive evaluation) evaluation and treatment as indicated    · Follow-up with Neurosurgery as an outpatient with repeat head CT imaging            Disposition: ARF most likely      SUBJECTIVE:  Chief Complaint: no complaints    Subjective:   Patient asleep when I entered room  Awake and alert  Pleasant and appropriate      OBJECTIVE:     Meds/Allergies     Current Facility-Administered Medications:     acetaminophen (TYLENOL) tablet 975 mg, 975 mg, Oral, Q8H [address], Sonam Ordoñez PA-C, 975 mg at 06/30/21 0520    amLODIPine (NORVASC) tablet 10 mg, 10 mg, Oral, Daily, Sonam Ordoñez PA-C, 10 mg at 06/30/21 0831    escitalopram (LEXAPRO) tablet 5 mg, 5 mg, Oral, Daily, YA Davila, 5 mg at 06/30/21 0830    heparin (porcine) subcutaneous injection 5,000 Units, 5,000 Units, Subcutaneous, Q8H [address], [place], [place], 5,000 Units at 06/30/21 1362    hydrALAZINE (APRESOLINE) injection 10 mg, 10 mg, Intravenous, Q6H PRN, ADELINA TAVERAS, 10 mg at 06/27/21 1827    HYDROmorphone (DILAUDID) injection 0 2 mg, 0 2 mg, Intravenous, Q4H PRN, ADELINA TAVERAS, 0 2 mg at 06/27/21 1645    insulin lispro (HumaLOG) 100 units/mL subcutaneous injection 1-5 Units, 1-5 Units, Subcutaneous, TID With Meals, 2 Units at 06/30/21 0835 **AND** Fingerstick Glucose (POCT), , , Q6H, YA Smalls    levETIRAcetam (KEPPRA) tablet 500 mg, 500 mg, Oral, Q12H [address], YA Smalls, 500 mg at 06/30/21 6175    levothyroxine tablet 25 mcg, 25 mcg, Oral, Daily, ADELINA TAVERAS, 25 mcg at 06/30/21 0520    melatonin tablet 3 mg, 3 mg, Oral, HS, YA Smalls, 3 mg at 06/29/21 2214    mirtazapine (REMERON) tablet 7 5 mg, 7 5 mg, Oral, HS, Ben Meraz PA-C, 7 5 mg at 06/29/21 2214    ondansetron (ZOFRAN) injection 4 mg, 4 mg, Intravenous, Q6H PRN, ADELINA TAVERAS, 4 mg at 06/27/21 1705    oxyCODONE (ROXICODONE) IR tablet 2 5 mg, 2 5 mg, Oral, Q4H PRN, ADELINA TAVERAS    oxyCODONE (ROXICODONE) IR tablet 5 mg, 5 mg, Oral, Q4H PRN, ADELINA TAVERAS     Vitals:   Vitals:    06/30/21 0733   BP: 107/55   Pulse: (!) 47   Resp: 14   Temp: (!) 97 °F (36 1 °C)   SpO2: (!) 89%       Intake/Output:  I/O       06/28 0701 - 06/29 0700 06/29 0701 - 06/30 0700 06/30 0701 - 07/01 0700    P  O  0      I V  30      IV Piggyback 100      Total Intake 130      Urine 300 830     Total Output 300 830     Net -170 -830                   Nutrition/GI Proph/Bowel Reg: regular diet    Physical Exam:   GENERAL APPEARANCE:  NAD  Resting comfortably  NEURO:  GCS 15  Alert and oriented x3  HEENT:  Left forehead cephalhematoma with bilateral periorbital ecchymosis  Pupils 3-2 mm equal and reactive bilaterally    CV:  RRR  LUNGS:  CTAB  GI:  Soft nontender  :  Voiding well  MSK:  Moving all extremities and neurovascularly intact x4  SKIN:  Warm and dry see above for ecchymosis    Invasive Devices     Drain            External Urinary Catheter 2 days                 Lab Results: BMP/CMP: No results found for: SODIUM, K, CL, CO2, ANIONGAP, BUN, CREATININE, GLUCOSE, CALCIUM, AST, ALT, ALKPHOS, PROT, BILITOT, EGFR and CBC: No results found for: WBC, HGB, HCT, MCV, PLT, ADJUSTEDWBC, MCH, MCHC, RDW, MPV, NRBC  Imaging/EKG Studies: Results: I have personally reviewed pertinent reports      Other Studies:    VTE Prophylaxis: Sequential compression device (Venodyne)  and Heparin

## 2021-06-30 NOTE — ASSESSMENT & PLAN NOTE
· local wound care  · no underlying skull or facial bone fracture  · Multimodal pain regimen  Ice    · Resolving

## 2021-06-30 NOTE — ASSESSMENT & PLAN NOTE
· Patient has expressed feelings of depression  · Per geriatrics recommendations will start Lexapro 5 mg    · Patient is feeling better this morning and denies any negative thoughts

## 2021-06-30 NOTE — ASSESSMENT & PLAN NOTE
· Neuro exam: GCS 15, non-focal  · Continue neurologic checks: Every 4 hours  · Reversal agent administered: K-Centra and DDAVP  · CT scan of the head on 6/27 reviewed:  Left parafalcine subdural hematoma  · Repeat head CT on 6/28 displayed stability  · follow-up Neurosurgery consulted and has since signed off  · Complete 7 day course of Keppra for seizure prophylaxis  · Chemical DVT prophylaxis:  Heparin  · Hold all anticoagulants and anti platelet medications for 2 weeks and/or until cleared by Neurosurgery to resume  · PT and OT (including cognitive evaluation) evaluation and treatment as indicated    · Follow-up with Neurosurgery as an outpatient with repeat head CT imaging

## 2021-06-30 NOTE — PLAN OF CARE
Problem: Potential for Falls  Goal: Patient will remain free of falls  Description: INTERVENTIONS:  - Educate patient/family on patient safety including physical limitations  - Instruct patient to call for assistance with activity   - Consult OT/PT to assist with strengthening/mobility   - Keep Call bell within reach  - Keep bed low and locked with side rails adjusted as appropriate  - Keep care items and personal belongings within reach  - Initiate and maintain comfort rounds  - Make Fall Risk Sign visible to staff  - Offer Toileting every Hours, in advance of need  - Initiate/Maintain alarm  - Obtain necessary fall risk management equipment:  - Apply yellow socks and bracelet for high fall risk patients  - Consider moving patient to room near nurses station  Outcome: Progressing     Problem: MOBILITY - ADULT  Goal: Maintain or return to baseline ADL function  Description: INTERVENTIONS:  -  Assess patient's ability to carry out ADLs; assess patient's baseline for ADL function and identify physical deficits which impact ability to perform ADLs (bathing, care of mouth/teeth, toileting, grooming, dressing, etc )  - Assess/evaluate cause of self-care deficits   - Assess range of motion  - Assess patient's mobility; develop plan if impaired  - Assess patient's need for assistive devices and provide as appropriate  - Encourage maximum independence but intervene and supervise when necessary  - Involve family in performance of ADLs  - Assess for home care needs following discharge   - Consider OT consult to assist with ADL evaluation and planning for discharge  - Provide patient education as appropriate  Outcome: Progressing  Goal: Maintains/Returns to pre admission functional level  Description: INTERVENTIONS:  - Perform BMAT or MOVE assessment daily    - Set and communicate daily mobility goal to care team and patient/family/caregiver     - Collaborate with rehabilitation services on mobility goals if consulted  - Perform Range of Motion 3 times a day  - Reposition patient every2 hours  - Dangle patient 3 times a day  - Stand patient 3 times a day  - Ambulate patient 3 times a day  - Out of bed to chair 3 times a day   - Out of bed for meals 3 times a day  - Out of bed for toileting  - Record patient progress and toleration of activity level   Outcome: Progressing     Problem: Prexisting or High Potential for Compromised Skin Integrity  Goal: Skin integrity is maintained or improved  Description: INTERVENTIONS:  - Identify patients at risk for skin breakdown  - Assess and monitor skin integrity  - Assess and monitor nutrition and hydration status  - Monitor labs   - Assess for incontinence   - Turn and reposition patient  - Assist with mobility/ambulation  - Relieve pressure over bony prominences  - Avoid friction and shearing  - Provide appropriate hygiene as needed including keeping skin clean and dry  - Evaluate need for skin moisturizer/barrier cream  - Collaborate with interdisciplinary team   - Patient/family teaching  - Consider wound care consult   Outcome: Progressing     Problem: Nutrition/Hydration-ADULT  Goal: Nutrient/Hydration intake appropriate for improving, restoring or maintaining nutritional needs  Description: Monitor and assess patient's nutrition/hydration status for malnutrition  Collaborate with interdisciplinary team and initiate plan and interventions as ordered  Monitor patient's weight and dietary intake as ordered or per policy  Utilize nutrition screening tool and intervene as necessary  Determine patient's food preferences and provide high-protein, high-caloric foods as appropriate       INTERVENTIONS:  - Monitor oral intake, urinary output, labs, and treatment plans  - Assess nutrition and hydration status and recommend course of action  - Evaluate amount of meals eaten  - Assist patient with eating if necessary   - Allow adequate time for meals  - Recommend/ encourage appropriate diets, oral nutritional supplements, and vitamin/mineral supplements  - Order, calculate, and assess calorie counts as needed  - Recommend, monitor, and adjust tube feedings and TPN/PPN based on assessed needs  - Assess need for intravenous fluids  - Provide specific nutrition/hydration education as appropriate  - Include patient/family/caregiver in decisions related to nutrition  Outcome: Progressing

## 2021-06-30 NOTE — CASE MANAGEMENT
CM received return call from Patient's daughter, Guillaume Campos  CM reviewed accepting facilities  Patient and her family want to accept the bed at [facility]  CM informed Marybel at [facility] of same  GALILEA sent [address]  [facility] [address] request to [facility]TS via [address]  CM informed by Gamaliel Bass at [facility] that [address] transport is scheduled for [phone] via [facility]  GALILEA informed Trauma, RN, and facility of same  CM spoke with the Patient's daughter, via phone, to inform her of scheduled transport time  Patient's daughter verbalized her understanding of the plan and requested CM to email her the contact details for [facility] to: [email or web address]  Cm emailed requested info  CM dept will continue to follow the Patient through dc

## 2021-06-30 NOTE — NURSING NOTE
Pt pulled out IV x2  Angie Fam per trauma team to have no IV access at this time as patient is a possible discharge

## 2021-06-30 NOTE — ASSESSMENT & PLAN NOTE
· Admission it was reported patient became dizzy and then fell out of bed after getting up from a nap  Daughter suspects this may be because she did not have her walker nearby however is concerned that something may have caused her to possibly pass out and fall  · Telemetry unremarkable over the past 24 hours  · Repeat orthostatic vital signs today    · EKG- unchanged   · Troponin (-) x2 on admission

## 2021-06-30 NOTE — DISCHARGE SUMMARY
Discharge Summary - Trauma Service   Lazaro eBrumen 80 y o  female MRN: [id number]  Unit/Bed#: S -01 Encounter: [id number]    Admission Date: 6/27/2021     Discharge Date: 6/30/21    Admitting Diagnosis: Subdural hematoma ([facility] [address] ) [S06 5X9A]  Head injury [S09 90XA]    Discharge Diagnosis:     Urinary frequency  Assessment & Plan  · UA negative and patient denies symptoms other than frequency  · Abdomen/pelvis is soft nontender  · Patient not taking Macrobid at home  Dc'd      Depression  Assessment & Plan  · Patient has expressed feelings of depression  · Per geriatrics recommendations will start Lexapro 5 mg  · Patient is feeling better this morning and denies any negative thoughts     Other insomnia  Assessment & Plan  · Started melatonin 3 mg HS, per geriatrics recommendations  · Improved      Hypokalemia  Assessment & Plan  · resolved  · 4 3 on 6/29     Syncope  Assessment & Plan  · Admission it was reported patient became dizzy and then fell out of bed after getting up from a nap  Daughter suspects this may be because she did not have her walker nearby however is concerned that something may have caused her to possibly pass out and fall  · Telemetry unremarkable over the past 24 hours  · Repeat orthostatic vital signs today  · EKG- unchanged   · Troponin (-) x2 on admission     History of atrial fibrillation  Assessment & Plan  · Currently sinus  · Not on antiarrhytmic therapy as outpatient  · Continue to hold Eliquis/ASA in setting Subdural hematoma  · Follow-up in 2 weeks with Neurosurgery for repeat imaging prior to restarting Eliquis         History of diabetes mellitus  Assessment & Plan  · Uncontrolled DM   A1C 10 2 on 6/9/21  · Home regimen: Metformin/Glimeperide  · ISS ACHS  · Maintain euglycemia, increase SSI     Coagulopathy (HCC)  Assessment & Plan  · 2/2 Eliquis for afib and ASA  · S/p DDAVP/Kcentra  · Continue to hold Eliquis/ASA     Platelet dysfunction ([facility] [address] )  Assessment & Plan  · In setting asa use  · S/p DDAVP  · Continue to hold for 2 weeks until follow-up with Neurosurgery      Fall  Assessment & Plan  · Injuries listed below  · PT/OT-pending  · Geriatrics consult appreciated  · Fall precautions     Cephalohematoma  Assessment & Plan  · local wound care  · no underlying skull or facial bone fracture  · Multimodal pain regimen  Ice  · Resolving     Other specified hypothyroidism  Assessment & Plan  · Continue levothyroxine  · Follow-up PCP     Essential hypertension  Assessment & Plan  · Continue home amlodipine  · BP stable  · Follow-up PCP     * Subdural hematoma (HCC)  Assessment & Plan  · Neuro exam: GCS 15, non-focal  · Continue neurologic checks: Every 4 hours  · Reversal agent administered: K-Centra and DDAVP  · CT scan of the head on 6/27 reviewed:  Left parafalcine subdural hematoma  · Repeat head CT on 6/28 displayed stability  · follow-up Neurosurgery consulted and has since signed off  · Complete 7 day course of Keppra for seizure prophylaxis  · Chemical DVT prophylaxis:  Heparin  · Hold all anticoagulants and anti platelet medications for 2 weeks and/or until cleared by Neurosurgery to resume  · PT and OT (including cognitive evaluation) evaluation and treatment as indicated  · Follow-up with Neurosurgery as an outpatient with repeat head CT imaging     Attending and Service: [facility]  Consulting Physician(s):   [facility]    Imaging and Procedures Performed:   Orders Placed This Encounter   Procedures    Fast Ultrasound       CT head wo contrast    Result Date: 6/28/2021  Impression: 1  Large frontal subcutaneous hematoma has slightly diminished in size  2  The left parafalcine subdural hematoma, the tiny left posterior subdural hematoma, and the trace amount of blood along the tentorium demonstrate no progression from prior  No new areas of hemorrhage  3  No mass effect or midline shift    I personally discussed this study with Dr Shea Mitchell on 6/28/2021 at 2:14 PM  Workstation performed: [id number]     TRAUMA - CT head wo contrast    Result Date: 6/27/2021  Impression: Large forehead subcutaneous hematoma without associated calvarial fracture  There is a small acute left anterior parafalcine subdural hematoma (series 2 image 34 ) I personally discussed this study with Clare Scales on 6/27/2021 at 2:57 PM   Workstation performed: [id number]     TRAUMA - CT facial bones wo contrast    Result Date: 6/27/2021  Impression: Large forehead subcutaneous hematoma, without associated facial bone fracture  Chronic paranasal sinus disease with also thickening in both maxillary sinuses and sphenoid sinuses  Air-fluid level in the right maxillary sinus could represent superimposed acute sinusitis  I personally discussed this study with Clare Scales on 6/27/2021 at 2:57 PM  Workstation performed: [id number]     TRAUMA - CT spine cervical wo contrast    Result Date: 6/27/2021  Impression: No cervical spine fracture or traumatic malalignment  I personally discussed this study with Clare Scales on 6/27/2021 at 2:57 PM   Workstation performed: [id number]     XR chest 1 view    Result Date: 6/28/2021  Impression: No acute cardiopulmonary disease within limitations of supine imaging  Workstation performed: [id number]     TRAUMA - CT chest abdomen pelvis w contrast    Result Date: 6/27/2021  Impression: No evidence of acute traumatic injury to the chest, abdomen, and pelvis  I personally discussed this study with Clare Scales on 6/27/2021 at 2:57 PM  Workstation performed: [id number]     XR Trauma multiple ([facility] trauma bay ONLY)    Result Date: 6/27/2021  Impression: No acute cardiopulmonary disease within limitations of supine imaging   Workstation performed: [id number] Course: Celso Rojas is a 77-year-old F with history of atrial fibrillation on Eliquis and diabetes brought to [facility] as trauma alert level B status post fall from standing when getting out of bed and she sustained a left anterior parafalcine subdural hematoma in the setting of Eliquis and aspirin  She was found to have a subdural hematoma and [facility] neurosurgery was consulted  A a repeat CT scan was stable and neurosurgery will follow her in 2 weeks with a repeat CT scan  They are recommending holding all anticoagulants and blood thinners until re-evaluated  Patient was given Kcentra and DDAVP upon arrival   A syncope workup included troponins and EKG and orthostatics which were negative for acute findings  She also has a large cephalhematoma twitches in but improving during hospitalization and she is taking Tylenol as needed  She has had no additional episodes of dizziness or syncope while hospitalized  PT and OT have recommended that she go to acute rehab to which the patient is agreeable  Patient was hat express feelings of depression while hospitalized and geriatrics evaluated recommending Lexapro and mirtazapine which resulted in improvement  Potassium was repleted as needed  At this time she has no outstanding issues and is cleared for discharge to rehab  On discharge, the patient is instructed to follow-up with the patient's primary care provider to review the events of the patient's recent hospitalization and neurosurgery in 2 weeks with a new cat scan  The patient is instructed to follow-up in the Trauma Clinic as needed  The patient should follow the provided discharge instructions  Condition at Discharge: fair     Discharge instructions/Information to patient and family:   See after visit summary for information provided to patient and family  Provisions for Follow-Up Care:  See after visit summary for information related to follow-up care and any pertinent home health orders        Disposition: Short-term rehab at [place] [id number] Readmission: No    Discharge Statement   I spent 25 minutes discharging the patient  This time was spent on the day of discharge  I had direct contact with the patient on the day of discharge  Additional documentation is required if more than 30 minutes were spent on discharge  Discharge Medications:  See after visit summary for reconciled discharge medications provided to patient and family        Angel Calzada PA-C  6/30/2021  2:27 PM

## 2021-06-30 NOTE — ASSESSMENT & PLAN NOTE
· UA negative and patient denies symptoms other than frequency  · Abdomen/pelvis is soft nontender  · Patient not taking Macrobid at home  Dc'd

## 2021-06-30 NOTE — CASE MANAGEMENT
GALILEA LMVOM for Patient's daughter, Ivis Hackett, to review accepting facilities and determine choice  CM dept will follow for return call

## 2021-07-01 ENCOUNTER — NURSING HOME VISIT (OUTPATIENT)
Dept: GERIATRICS | Facility: OTHER | Age: 86
End: 2021-07-01
Payer: MEDICARE

## 2021-07-01 VITALS
SYSTOLIC BLOOD PRESSURE: 126 MMHG | OXYGEN SATURATION: 94 % | TEMPERATURE: 97.3 F | WEIGHT: 143 LBS | DIASTOLIC BLOOD PRESSURE: 78 MMHG | BODY MASS INDEX: 28.88 KG/M2 | HEART RATE: 82 BPM | RESPIRATION RATE: 18 BRPM

## 2021-07-01 DIAGNOSIS — S06.5X9A SUBDURAL HEMATOMA (HCC): Primary | ICD-10-CM

## 2021-07-01 PROCEDURE — 99305 1ST NF CARE MODERATE MDM 35: CPT | Performed by: INTERNAL MEDICINE

## 2021-07-01 NOTE — PROGRESS NOTES
[facility]  [address], [address] [place] [address]  [id number]    Nursing Home Admission    NAME: Chely Boucher  AGE: 80 y o  SEX: female [id number]      Patient Location     [facility]    Patients care was coordinated with nursing facility staff  Recent vitals, labs and updated medications were reviewed on [facility]  Past Medical, surgical, social, medication and allergy history and patients previous records reviewed  Assessment/Plan:    Subdural hematoma (HCC)  History of recent fall resulting in small left parafalcine  subdural hematoma revealed by CT on 06/27/2021  Patient had been on Eliquis and aspirin  Both were discontinued  She is status post Kcentra/DDAVP treatment  Conservative treatment was recommended by neurosurgical service  Continue levetiracetam for seizure precautions for total of 9 administrations  Follow-up with neuro surgical service    Diabetes mellitus type 2:  Uncontrolled by recent hemoglobin A1c of 10 2  Per records patient had been on metformin and glimepiride however was discharged on glimepiride 4 mg b i d   will resume Glucophage 1000 mg b i d  and follow  Recent creatinine was stable    History of CVA:  Aspirin and Eliquis to be held for 2 weeks pending neurosurgical evaluation in view of subdural hematoma  Continue risk factor control    Hypertension:  Blood pressure stable on amlodipine 10 mg daily    Depression:  Continue mirtazapine 7 5 mg daily    Hypothyroidism:  Continue levothyroxine        Chief Complaint     Subdural hematoma status post fall    HPI       Patient is a 80 y o  female with past medical history significant for diabetes mellitus type 2, arthritis, glaucoma, hypertension, hyperlipidemia, hypothyroidism and GERD  Patient was recently hospitalized with a syncope episode and fall  CT head revealed small subdural hematoma    Patient had been on aspirin and Eliquis she was given DDAVP/Kcentra  Conservative treatment was recommended by neurosurgical service     Repeat CT head on 06/28 displayed stability  Recommendations were to hold aspirin and Eliquis for 2 weeks with outpatient neurosurgical and repeat imaging follow-up  Patient was subsequently discharged to [facility] rehab where she is being seen for post hospital admission  At the time of my evaluation patient is doing okay  Blood sugars are running on the higher side in mid 200's      Past Medical History:   Diagnosis Date    Arthritis     Diabetes ([address] )     Type 2    Glaucoma     Headache     Heartburn     Hypercholesteremia     Hypertension     Hypothyroidism     Migraines     Night sweats     Weight gain        Past Surgical History:   Procedure Laterality Date    CHOLECYSTECTOMY      TUBAL LIGATION      TUMOR REMOVAL Left     WISDOM TOOTH EXTRACTION         Social History     Tobacco Use   Smoking Status Never Smoker   Smokeless Tobacco Never Used          Family History   Problem Relation Age of Onset    Other Mother         natural causes     Rheumatic fever Father     No Known Problems Sister     Other Brother         natural causes     No Known Problems Sister     Other Brother         natural causes     Diabetes Daughter     Allergies Daughter     No Known Problems Daughter     No Known Problems Daughter     No Known Problems Daughter     No Known Problems Son         No Known Allergies       Current Outpatient Medications:     acetaminophen (TYLENOL) 325 mg tablet, Take 3 tablets (975 mg total) by mouth every 8 (eight) hours, Disp: , Rfl: 0    amLODIPine (NORVASC) 10 mg tablet, Take 1 tablet (10 mg total) by mouth daily, Disp: 90 tablet, Rfl: 3    ergocalciferol (VITAMIN D2) 50,000 units, Take 1 capsule (50,000 Units total) by mouth once a week, Disp: 12 capsule, Rfl: 2    escitalopram (LEXAPRO) 5 mg tablet, Take 1 tablet (5 mg total) by mouth daily, Disp: , Rfl: 0    glimepiride (AMARYL) 4 mg tablet, Take 1 tablet (4 mg total) by mouth 2 (two) times a day, Disp: 180 tablet, Rfl: 3    Lancets (freestyle) lancets, Use as instructed, Disp: 100 each, Rfl: 11    levETIRAcetam (KEPPRA) 500 mg tablet, Take 1 tablet (500 mg total) by mouth every 12 (twelve) hours for 9 doses, Disp: 9 tablet, Rfl: 0    levothyroxine 25 mcg tablet, Take 1 tablet (25 mcg total) by mouth daily, Disp: 90 tablet, Rfl: 3    melatonin 3 mg, Take 1 tablet (3 mg total) by mouth daily at bedtime, Disp: , Rfl: 0    mirtazapine (REMERON) 7 5 MG tablet, Take 1 tablet (7 5 mg total) by mouth daily at bedtime x2 days then increase to 15 mg HS, Disp: , Rfl: 0    Updated list was reviewed in [facility] of facility  Vitals:    07/01/21 1554   BP: 126/78   Pulse: 82   Resp: 18   Temp: (!) 97 3 °F (36 3 °C)   SpO2: 94%       Vital signs were reviewed in [facility] care    Review of Systems   Constitutional: Positive for fatigue  Negative for chills and fever  HENT: Negative for nosebleeds and rhinorrhea  Eyes: Negative for discharge and redness  Respiratory: Negative for cough, chest tightness, shortness of breath, wheezing and stridor  Cardiovascular: Negative for chest pain and leg swelling  Gastrointestinal: Negative for abdominal distention, abdominal pain, diarrhea and vomiting  Genitourinary: Negative for dysuria, flank pain and hematuria  Musculoskeletal: Positive for gait problem  Negative for arthralgias and back pain  Skin: Negative for pallor  Neurological: Positive for weakness (Generalized)  Negative for tremors, seizures, syncope and headaches  History of recent fall   Psychiatric/Behavioral: Negative for agitation, behavioral problems and confusion  Physical Exam  Constitutional:       General: She is not in acute distress  Appearance: She is well-developed  She is not diaphoretic  HENT:      Head: Normocephalic and atraumatic  Nose: No rhinorrhea     Eyes: General: No scleral icterus  Right eye: No discharge  Left eye: No discharge  Cardiovascular:      Rate and Rhythm: Normal rate and regular rhythm  Pulmonary:      Effort: No respiratory distress  Breath sounds: No stridor  No wheezing  Abdominal:      General: There is no distension  Palpations: Abdomen is soft  Tenderness: There is no abdominal tenderness  There is no guarding  Musculoskeletal:      Cervical back: Neck supple  Right lower leg: No edema  Left lower leg: No edema  Skin:     Coloration: Skin is not pale  Findings: Bruising (Involving periorbital area  Small hematoma noted over left frontal scab) present  Comments: Small skin tear with hematoma involving left forehead  Ecchymosis involving right arm  Neurological:      General: No focal deficit present  Mental Status: She is alert  Mental status is at baseline  Cranial Nerves: No cranial nerve deficit  Psychiatric:         Mood and Affect: Mood normal          Behavior: Behavior normal            Diagnostic Data       Recent labs and imaging studies were reviewed  Lab Results   Component Value Date    WBC 7 52 06/29/2021    HGB 14 0 06/29/2021    HCT 42 2 06/29/2021    MCV 92 06/29/2021     06/29/2021     Lab Results   Component Value Date    SODIUM 138 06/29/2021    K 4 3 06/29/2021     06/29/2021    CO2 24 06/29/2021    BUN 20 06/29/2021    CREATININE 0 65 06/29/2021    GLUC 191 (H) 06/29/2021    CALCIUM 9 4 06/29/2021        Code Status:      DNR    Additional notes:      Continue to hold Eliquis and aspirin  Follow-up with neuro surgical service    Resume Metformin 1000 mg bid  Repeat labs are due on 07/05/21    This note was electronically signed by Dr Army Oppenheim

## 2021-07-01 NOTE — ASSESSMENT & PLAN NOTE
History of recent fall resulting in small left parafalcine  subdural hematoma revealed by CT on 06/27/2021  Patient had been on Eliquis and aspirin  Both were discontinued  She is status post Kcentra/DDAVP treatment  Conservative treatment was recommended by neurosurgical service  Continue levetiracetam for seizure precautions for total of 9 administrations    Follow-up with neuro surgical service

## 2021-07-07 ENCOUNTER — NURSING HOME VISIT (OUTPATIENT)
Dept: GERIATRICS | Facility: OTHER | Age: 86
End: 2021-07-07
Payer: MEDICARE

## 2021-07-07 DIAGNOSIS — I10 ESSENTIAL HYPERTENSION: ICD-10-CM

## 2021-07-07 DIAGNOSIS — E03.8 OTHER SPECIFIED HYPOTHYROIDISM: ICD-10-CM

## 2021-07-07 DIAGNOSIS — Z86.79 HISTORY OF ATRIAL FIBRILLATION: ICD-10-CM

## 2021-07-07 DIAGNOSIS — E11.9 TYPE 2 DIABETES MELLITUS WITHOUT COMPLICATION, WITHOUT LONG-TERM CURRENT USE OF INSULIN (HCC): ICD-10-CM

## 2021-07-07 DIAGNOSIS — R11.0 NAUSEA: ICD-10-CM

## 2021-07-07 DIAGNOSIS — S06.5X9A SUBDURAL HEMATOMA (HCC): Primary | ICD-10-CM

## 2021-07-07 DIAGNOSIS — R19.7 DIARRHEA, UNSPECIFIED TYPE: ICD-10-CM

## 2021-07-07 DIAGNOSIS — E11.69 TYPE 2 DIABETES MELLITUS WITH OTHER SPECIFIED COMPLICATION, WITHOUT LONG-TERM CURRENT USE OF INSULIN (HCC): ICD-10-CM

## 2021-07-07 PROCEDURE — 99309 SBSQ NF CARE MODERATE MDM 30: CPT | Performed by: NURSE PRACTITIONER

## 2021-07-09 VITALS
RESPIRATION RATE: 18 BRPM | DIASTOLIC BLOOD PRESSURE: 76 MMHG | SYSTOLIC BLOOD PRESSURE: 112 MMHG | TEMPERATURE: 97.3 F | HEART RATE: 76 BPM | OXYGEN SATURATION: 98 %

## 2021-07-09 PROBLEM — R19.7 DIARRHEA: Status: ACTIVE | Noted: 2021-07-09

## 2021-07-09 PROBLEM — R11.0 NAUSEA: Status: ACTIVE | Noted: 2021-07-09

## 2021-07-09 PROBLEM — R11.2 NAUSEA AND VOMITING: Status: ACTIVE | Noted: 2021-07-09

## 2021-07-09 NOTE — ASSESSMENT & PLAN NOTE
· Pt c/o of diarrhea  Nurse reports pt had one episode  Abdomen is soft and nontender  · Start Imodium 2 mg every 6 hours as needed  · Ensure patient is adequately hydrated  · Will continue to monitor

## 2021-07-09 NOTE — ASSESSMENT & PLAN NOTE
Lab Results   Component Value Date    HGBA1C 10 2 (A) 06/09/2021   · Last Hgb was reported be to be 10 2  · BBG is b i d   · Patient remains on glimepiride 4 mg b i d  and metformin 1000 mg b i d  Ferol Shaggy · Since 07/03 blood sugars have been mostly under 200  · Will continue to monitor

## 2021-07-09 NOTE — ASSESSMENT & PLAN NOTE
· No headache, change in mental status, or focal deficits  · Status post fall with small left parafalcine subdural hematoma reported on CT 06/27/2021  · Eliquis and aspirin discontinued at the time  · Conservative treatment was recommended by neurology service    · Pt finished leveticetirum  · Follow-up with neurosurgery

## 2021-07-09 NOTE — ASSESSMENT & PLAN NOTE
· Patient reports feeling nauseous  Abdomen soft nontender  · Start Zofran 4 mg every 6 hours as needed  · Continue to monitor

## 2021-07-09 NOTE — ASSESSMENT & PLAN NOTE
· Patient is in sinus rhythm  · Eliquis and aspirin is on hold in the setting of subdural hematoma  · Patient has neuro surgical follow-up in 2 weeks with the potential of restarting Eliquis

## 2021-07-09 NOTE — PROGRESS NOTES
[facility]  [address] [facility]  [phone]  CODE [id number]    PROGRESS NOTE    Patients care was coordinated with nursing facility staff  Recent vitals, labs and updated medications were reviewed on [facility]  Past Medical, surgical, social, medication and allergy history and patients previous records reviewed  Name: Arcelia Choudhury  AGE: 80 y o  SEX: female    MRN: [id number]    DATE OF ENCOUNTER: 7/7/21    Patient Location      [address]    Reason For Visit/ Chief Complaint       1  Subdural hematoma (HCC)  Assessment & Plan:  · No headache, change in mental status, or focal deficits  · Status post fall with small left parafalcine subdural hematoma reported on CT 06/27/2021  · Eliquis and aspirin discontinued at the time  · Conservative treatment was recommended by neurology service  · Pt finished leveticetirum  · Follow-up with neurosurgery      2  Nausea  Assessment & Plan:  · Patient reports feeling nauseous  Abdomen soft nontender  · Start Zofran 4 mg every 6 hours as needed  · Continue to monitor  3  Diarrhea, unspecified type  Assessment & Plan:  · Pt c/o of diarrhea  Nurse reports pt had one episode  Abdomen is soft and nontender  · Start Imodium 2 mg every 6 hours as needed  · Ensure patient is adequately hydrated  · Will continue to monitor  4  Essential hypertension  Assessment & Plan:  · Blood pressure readings have been reviewed and have been stable  · Blood pressure today 112/76  · Patient remains on amlodipine 10 mg daily        5  Type 2 diabetes mellitus with other specified complication, without long-term current use of insulin (HCC)  Assessment & Plan:    Lab Results   Component Value Date    HGBA1C 10 2 (A) 06/09/2021   · Last Hgb was reported be to be 10 2  · BBG is b i d   · Patient remains on glimepiride 4 mg b i d  and metformin 1000 mg b  i  d  Lynita Ped · Since 07/03 blood sugars have been mostly under 200  · Will continue to monitor  6  History of atrial fibrillation  Assessment & Plan:  · Patient is in sinus rhythm  · Eliquis and aspirin is on hold in the setting of subdural hematoma  · Patient has neuro surgical follow-up in 2 weeks with the potential of restarting Eliquis  7  Other specified hypothyroidism  Assessment & Plan:  · Continue levothyroxine 25 mcg daily  8  Type 2 diabetes mellitus without complication, without long-term current use of insulin (HCC)  Assessment & Plan:    Lab Results   Component Value Date    HGBA1C 10 2 (A) 06/09/2021   · Last Hgb was reported be to be 10 2  · BBG is b i d   · Patient remains on glimepiride 4 mg b i d  and metformin 1000 mg b i d  Lynita Ped · Since 07/03 blood sugars have been mostly under 200  · Will continue to monitor  HPI      Mikal Campos is a 80 y o  female who is being seen for an acute visit  Patient c/o of diarrhea x1 and nausaa  VSS  On examination abdomen is soft and non-tender  She is in no acute distress  Currently she appears to be comfortable and is resting comfortably in bed  Appetite is fair  No reports of fever, chills, abdominal pain, chest pain,  palpitations, increased shortness of breath, or problems with voiding  Review of Systems   Constitutional: Positive for fatigue  Negative for chills and fever  HENT: Negative for nosebleeds and rhinorrhea  Eyes: Negative for discharge and redness  Respiratory: Negative for cough, chest tightness, shortness of breath, wheezing and stridor  Cardiovascular: Negative for chest pain and leg swelling  Gastrointestinal: Positive for diarrhea and nausea  Negative for abdominal distention, abdominal pain and vomiting  Genitourinary: Negative for dysuria, flank pain and hematuria  Musculoskeletal: Positive for gait problem  Negative for arthralgias and back pain  Skin: Negative for pallor     Neurological: Positive for weakness (Generalized)  Negative for tremors, seizures, syncope and headaches  Psychiatric/Behavioral: Negative for agitation, behavioral problems and confusion         Past Medical History:   Diagnosis Date    Arthritis     Diabetes ([address] )     Type 2    Glaucoma     Headache     Heartburn     Hypercholesteremia     Hypertension     Hypothyroidism     Migraines     Night sweats     Weight gain        Past Surgical History:   Procedure Laterality Date    CHOLECYSTECTOMY      TUBAL LIGATION      TUMOR REMOVAL Left     WISDOM TOOTH EXTRACTION         Social History     Social History     Tobacco Use   Smoking Status Never Smoker   Smokeless Tobacco Never Used       Social History     Substance and Sexual Activity   Alcohol Use Not Currently       Family History:   Family History   Problem Relation Age of Onset    Other Mother         natural causes     Rheumatic fever Father     No Known Problems Sister     Other Brother         natural causes     No Known Problems Sister     Other Brother         natural causes     Diabetes Daughter     Allergies Daughter     No Known Problems Daughter     No Known Problems Daughter     No Known Problems Daughter     No Known Problems Son        No Known Allergies    Medications       Current Outpatient Medications:     acetaminophen (TYLENOL) 325 mg tablet, Take 3 tablets (975 mg total) by mouth every 8 (eight) hours, Disp: , Rfl: 0    amLODIPine (NORVASC) 10 mg tablet, Take 1 tablet (10 mg total) by mouth daily, Disp: 90 tablet, Rfl: 3    ergocalciferol (VITAMIN D2) 50,000 units, Take 1 capsule (50,000 Units total) by mouth once a week, Disp: 12 capsule, Rfl: 2    glimepiride (AMARYL) 4 mg tablet, Take 1 tablet (4 mg total) by mouth 2 (two) times a day, Disp: 180 tablet, Rfl: 3    levothyroxine 25 mcg tablet, Take 1 tablet (25 mcg total) by mouth daily, Disp: 90 tablet, Rfl: 3    melatonin 3 mg, Take 1 tablet (3 mg total) by mouth daily at bedtime, Disp: , Rfl: 0    metFORMIN (GLUCOPHAGE) 1000 MG tablet, Take 1,000 mg by mouth 2 (two) times a day with meals, Disp: , Rfl:     mirtazapine (REMERON) 7 5 MG tablet, Take 1 tablet (7 5 mg total) by mouth daily at bedtime x2 days then increase to 15 mg HS, Disp: , Rfl: 0    Lancets (freestyle) lancets, Use as instructed, Disp: 100 each, Rfl: 11    Updated list was reviewed in [facility] system of facility  Vitals:    07/07/21 0914   BP: 112/76   Pulse: 76   Resp: 18   Temp: (!) 97 3 °F (36 3 °C)   SpO2: 98%       Physical Exam  Constitutional:       General: She is not in acute distress  Appearance: She is well-developed  She is not toxic-appearing or diaphoretic  HENT:      Head: Normocephalic and atraumatic  Nose: Nose normal       Mouth/Throat:      Mouth: Mucous membranes are moist    Eyes:      General: No scleral icterus  Right eye: No discharge  Left eye: No discharge  Cardiovascular:      Rate and Rhythm: Normal rate  Heart sounds: No murmur heard  Pulmonary:      Effort: Pulmonary effort is normal  No respiratory distress  Breath sounds: Normal breath sounds  No stridor  No wheezing or rales  Abdominal:      General: Bowel sounds are normal  There is no distension  Palpations: Abdomen is soft  Tenderness: There is no abdominal tenderness  There is no guarding  Musculoskeletal:         General: No deformity  Normal range of motion  Cervical back: Normal range of motion and neck supple  No tenderness  Skin:     General: Skin is warm and dry  Coloration: Skin is not jaundiced  Findings: Lesion (hematoma on  left forehead) present  No bruising (bilateral periorbital ecchymosis)  Neurological:      General: No focal deficit present  Mental Status: She is alert  Motor: Weakness present     Psychiatric:         Mood and Affect: Mood normal          Diagnostic Data     Recent labs were reviewed   07/06/2021- Hgb 13 1, Hct 38 4, WBC 9 6   07/06/2021-BUN 14, Cr 0 49, , K 3 6    Additional Notes      Patients care was coordinated with nursing facility staff  Recent vitals, labs and updated medications were reviewed on [facility]  Past Medical, surgical, social, medication and allergy history and patients previous records reviewed   **Please note this f/u this note was constructed using a voice recognition system  **  This was electronically signed by YA Faye

## 2021-07-09 NOTE — ASSESSMENT & PLAN NOTE
· Blood pressure readings have been reviewed and have been stable  · Blood pressure today 112/76  · Patient remains on amlodipine 10 mg daily

## 2021-07-12 ENCOUNTER — NURSING HOME VISIT (OUTPATIENT)
Dept: GERIATRICS | Facility: OTHER | Age: 86
End: 2021-07-12
Payer: MEDICARE

## 2021-07-12 ENCOUNTER — HOSPITAL ENCOUNTER (OUTPATIENT)
Dept: CT IMAGING | Facility: HOSPITAL | Age: 86
Discharge: HOME | End: 2021-07-12
Payer: MEDICARE

## 2021-07-12 VITALS
SYSTOLIC BLOOD PRESSURE: 151 MMHG | TEMPERATURE: 97.4 F | RESPIRATION RATE: 16 BRPM | OXYGEN SATURATION: 96 % | HEART RATE: 69 BPM | DIASTOLIC BLOOD PRESSURE: 72 MMHG

## 2021-07-12 DIAGNOSIS — S06.5X9A SUBDURAL HEMATOMA (HCC): ICD-10-CM

## 2021-07-12 DIAGNOSIS — Z86.79 HISTORY OF ATRIAL FIBRILLATION: ICD-10-CM

## 2021-07-12 DIAGNOSIS — F32.A DEPRESSION, UNSPECIFIED DEPRESSION TYPE: ICD-10-CM

## 2021-07-12 DIAGNOSIS — R11.0 NAUSEA: ICD-10-CM

## 2021-07-12 DIAGNOSIS — E55.9 VITAMIN D DEFICIENCY: ICD-10-CM

## 2021-07-12 DIAGNOSIS — E11.69 TYPE 2 DIABETES MELLITUS WITH OTHER SPECIFIED COMPLICATION, WITHOUT LONG-TERM CURRENT USE OF INSULIN (HCC): ICD-10-CM

## 2021-07-12 DIAGNOSIS — S06.5X9A SUBDURAL HEMATOMA (HCC): Primary | ICD-10-CM

## 2021-07-12 DIAGNOSIS — I10 ESSENTIAL HYPERTENSION: ICD-10-CM

## 2021-07-12 PROCEDURE — G1004 CDSM NDSC: HCPCS

## 2021-07-12 PROCEDURE — 99309 SBSQ NF CARE MODERATE MDM 30: CPT | Performed by: NURSE PRACTITIONER

## 2021-07-12 PROCEDURE — 70450 CT HEAD/BRAIN W/O DYE: CPT

## 2021-07-12 RX ORDER — LOPERAMIDE HYDROCHLORIDE 2 MG/1
2 TABLET ORAL 4 TIMES DAILY PRN
COMMUNITY
End: 2021-07-28 | Stop reason: SDUPTHER

## 2021-07-12 NOTE — ASSESSMENT & PLAN NOTE
· Patient denies negative thoughts and was in a pleasant mood  She reports family is coming to get her later on today for an appt     · Patient remains on mirtazapine 7 5 mg qhs

## 2021-07-12 NOTE — ASSESSMENT & PLAN NOTE
· Patient reports localized pain in area of hematoma  · Local wound care  · Patient complains of mild pain left forehead  · With continue Tylenol 975 mg every 8 hours

## 2021-07-12 NOTE — PROGRESS NOTES
[facility]  [address] [id number]    PROGRESS NOTE    Patients care was coordinated with nursing facility staff  Recent vitals, labs and [facility]  Past Medical, surgical, social, medication and allergy history and patients previous records reviewed  Name: Raghav Senior  AGE: 80 y o  SEX: female    MRN: [id number]    DATE OF ENCOUNTER:  07/12/2021    Patient Location      [address]    Reason For Visit/ Chief Complaint       1  Subdural hematoma (HCC)  Assessment & Plan:  · S/P subdural hematoma reported on CT of 06/27/2021  · Currently stable - No change in mental status, no focal deficits, or headaches reported  · Eliquis and aspirin have been discontinued at this time  · Conservative treatment recommended by Neuro service  · Patient has a followup with neurosurgeon  · Bruising is gradually improving  2  Cephalohematoma  Assessment & Plan:  · Patient reports localized pain in area of hematoma  · Local wound care  · Patient complains of mild pain left forehead  · With continue Tylenol 975 mg every 8 hours  3  Essential hypertension  Assessment & Plan:  · Blood pressure slightly elevated today  151/72  · Previous to that most BP readings were WNL  · Will continue to monitor and adjust medication as needed  · Continue amlodipine 10 mg daily  4  Type 2 diabetes mellitus with other specified complication, without long-term current use of insulin (HCC)  Assessment & Plan:    Lab Results   Component Value Date    HGBA1C 10 2 (A) 06/09/2021   · Stable and appropriate with age  · FBS was 127 this am   · Continue metformin a 1000 mg b i d   · Continue glimepiride 1 mg b i d    · Continue BBGs BID           5  Depression, unspecified depression type  Assessment & Plan:  · Patient denies negative thoughts and was in a pleasant mood  She reports family is coming to get her later on today for an appt  · Patient remains on mirtazapine 7 5 mg qhs       6  Nausea  Assessment & Plan:  · Abdomen is soft and nontender  No reports of nausea today  7  History of atrial fibrillation  Assessment & Plan:  · Eliquis and ASA recently discontinued  · Possible resumption after neuro appt  · Currently in sinus rhythm  8  Vitamin D deficiency  Assessment & Plan:  · Continue vitamin-D 25633 units daily week  TERRIE Choudhury is a 80 y o  female who is being seen for a follow-up visit  VSS  On examination Pt  Appears to be in no acute distress and is resting comfortably in her bed  Nurses report no acute issues  Recent labs have been reviewed  Patient reports no pain or discomfort currently  Appetite is reported to be fair  No reports of fever, chills, abdominal pain, chest pain, palpitations, increased shortness of breath, or problems with voiding  Review of Systems   Constitutional: Positive for fatigue  Negative for chills and fever  HENT: Negative for nosebleeds and rhinorrhea  Eyes: Negative for discharge and redness  Respiratory: Negative for cough, chest tightness, shortness of breath, wheezing and stridor  Cardiovascular: Negative for chest pain and leg swelling  Gastrointestinal: Negative for abdominal distention, abdominal pain, diarrhea and vomiting  Genitourinary: Negative for difficulty urinating, dysuria, flank pain and hematuria  Musculoskeletal: Positive for gait problem  Negative for arthralgias and back pain  Skin: Negative for pallor  Neurological: Positive for weakness (Generalized)  Negative for dizziness, tremors, seizures, syncope and headaches  Psychiatric/Behavioral: Negative for agitation, behavioral problems and confusion         Past Medical History:   Diagnosis Date    Arthritis     Diabetes ([address] )     Type 2    Glaucoma     Headache     Heartburn  Hypercholesteremia     Hypertension     Hypothyroidism     Migraines     Night sweats     Weight gain        Past Surgical History:   Procedure Laterality Date    CHOLECYSTECTOMY      TUBAL LIGATION      TUMOR REMOVAL Left     WISDOM TOOTH EXTRACTION         Social History     Social History     Tobacco Use   Smoking Status Never Smoker   Smokeless Tobacco Never Used       Social History     Substance and Sexual Activity   Alcohol Use Not Currently       Family History:   Family History   Problem Relation Age of Onset    Other Mother         natural causes     Rheumatic fever Father     No Known Problems Sister     Other Brother         natural causes     No Known Problems Sister     Other Brother         natural causes     Diabetes Daughter     Allergies Daughter     No Known Problems Daughter     No Known Problems Daughter     No Known Problems Daughter     No Known Problems Son        No Known Allergies    Medications       Current Outpatient Medications:     acetaminophen (TYLENOL) 325 mg tablet, Take 3 tablets (975 mg total) by mouth every 8 (eight) hours, Disp: , Rfl: 0    amLODIPine (NORVASC) 10 mg tablet, Take 1 tablet (10 mg total) by mouth daily, Disp: 90 tablet, Rfl: 3    ergocalciferol (VITAMIN D2) 50,000 units, Take 1 capsule (50,000 Units total) by mouth once a week, Disp: 12 capsule, Rfl: 2    glimepiride (AMARYL) 4 mg tablet, Take 1 tablet (4 mg total) by mouth 2 (two) times a day, Disp: 180 tablet, Rfl: 3    levothyroxine 25 mcg tablet, Take 1 tablet (25 mcg total) by mouth daily, Disp: 90 tablet, Rfl: 3    loperamide (IMODIUM A-D) 2 MG tablet, Take 2 mg by mouth 4 (four) times a day as needed for diarrhea, Disp: , Rfl:     melatonin 3 mg, Take 1 tablet (3 mg total) by mouth daily at bedtime, Disp: , Rfl: 0    metFORMIN (GLUCOPHAGE) 1000 MG tablet, Take 1,000 mg by mouth 2 (two) times a day with meals, Disp: , Rfl:     mirtazapine (REMERON) 7 5 MG tablet, Take 1 tablet (7 5 mg total) by mouth daily at bedtime x2 days then increase to 15 mg HS, Disp: , Rfl: 0    Lancets (freestyle) lancets, Use as instructed, Disp: 100 each, Rfl: 11    Updated list was reviewed in [facility] system of facility  Vitals:    07/12/21 0853   BP: 151/72   Pulse: 69   Resp: 16   Temp: (!) 97 4 °F (36 3 °C)   SpO2: 96%       Physical Exam  Constitutional:       General: She is not in acute distress  Appearance: She is well-developed  She is not diaphoretic  HENT:      Head: Normocephalic and atraumatic  Nose: No rhinorrhea  Mouth/Throat:      Mouth: Mucous membranes are moist    Eyes:      General: No scleral icterus  Right eye: No discharge  Left eye: No discharge  Conjunctiva/sclera: Conjunctivae normal    Cardiovascular:      Rate and Rhythm: Normal rate  Heart sounds: No murmur heard  Pulmonary:      Effort: Pulmonary effort is normal  No respiratory distress  Breath sounds: Normal breath sounds  No stridor  No wheezing or rales  Abdominal:      General: Bowel sounds are normal  There is no distension  Palpations: Abdomen is soft  Tenderness: There is no abdominal tenderness  There is no guarding  Musculoskeletal:         General: No deformity  Normal range of motion  Cervical back: Normal range of motion and neck supple  No tenderness  Right lower leg: No edema  Left lower leg: No edema  Skin:     General: Skin is warm and dry  Coloration: Skin is not jaundiced  Findings: Bruising (Periorbital bruising and hematoma on left forehead) and lesion present  Neurological:      General: No focal deficit present  Mental Status: She is alert  Mental status is at baseline  Motor: Weakness present  Gait: Gait abnormal    Psychiatric:         Mood and Affect: Mood normal          Behavior: Behavior normal          Diagnostic Data     Recent labs were reviewed       07/06/2021 Hgb 13 1, Hct 38 4, WBC 9 6   07/06/2021 BUN 14, Cr 0 49, , K 3 6    Additional Notes      Patients care was coordinated with nursing facility staff  Recent vitals, labs and updated medications were reviewed on [facility]  Past Medical, surgical, social, medication and allergy history and patients previous records reviewed   CBC on 7/15/21   **Please note this f/u this note was constructed using a voice recognition system  **  This was electronically signed by YA Harmon

## 2021-07-12 NOTE — ASSESSMENT & PLAN NOTE
Lab Results   Component Value Date    HGBA1C 10 2 (A) 06/09/2021   · Stable and appropriate with age  · FBS was 127 this am   · Continue metformin a 1000 mg b i d   · Continue glimepiride 1 mg b i d    · Continue BBGs BID

## 2021-07-12 NOTE — ASSESSMENT & PLAN NOTE
· Eliquis and ASA recently discontinued  · Possible resumption after neuro appt  · Currently in sinus rhythm

## 2021-07-12 NOTE — ASSESSMENT & PLAN NOTE
· Blood pressure slightly elevated today  151/72  · Previous to that most BP readings were WNL  · Will continue to monitor and adjust medication as needed  · Continue amlodipine 10 mg daily

## 2021-07-15 ENCOUNTER — OFFICE VISIT (OUTPATIENT)
Dept: NEUROSURGERY | Facility: CLINIC | Age: 86
End: 2021-07-15
Payer: MEDICARE

## 2021-07-15 ENCOUNTER — NURSING HOME VISIT (OUTPATIENT)
Dept: GERIATRICS | Facility: OTHER | Age: 86
End: 2021-07-15
Payer: MEDICARE

## 2021-07-15 VITALS
HEART RATE: 76 BPM | WEIGHT: 143 LBS | RESPIRATION RATE: 18 BRPM | BODY MASS INDEX: 28.83 KG/M2 | TEMPERATURE: 98.1 F | HEIGHT: 59 IN | SYSTOLIC BLOOD PRESSURE: 128 MMHG | DIASTOLIC BLOOD PRESSURE: 86 MMHG

## 2021-07-15 DIAGNOSIS — S06.5X9A SUBDURAL HEMATOMA (HCC): Primary | ICD-10-CM

## 2021-07-15 DIAGNOSIS — E55.9 VITAMIN D DEFICIENCY: ICD-10-CM

## 2021-07-15 DIAGNOSIS — E03.8 OTHER SPECIFIED HYPOTHYROIDISM: ICD-10-CM

## 2021-07-15 DIAGNOSIS — E11.69 TYPE 2 DIABETES MELLITUS WITH OTHER SPECIFIED COMPLICATION, WITHOUT LONG-TERM CURRENT USE OF INSULIN (HCC): ICD-10-CM

## 2021-07-15 DIAGNOSIS — S06.5X9A SDH (SUBDURAL HEMATOMA) (HCC): Primary | ICD-10-CM

## 2021-07-15 DIAGNOSIS — I10 ESSENTIAL HYPERTENSION: ICD-10-CM

## 2021-07-15 PROCEDURE — 99214 OFFICE O/P EST MOD 30 MIN: CPT | Performed by: PHYSICIAN ASSISTANT

## 2021-07-15 PROCEDURE — 99309 SBSQ NF CARE MODERATE MDM 30: CPT | Performed by: NURSE PRACTITIONER

## 2021-07-15 NOTE — PROGRESS NOTES
Office Note - Neurosurgery   Roseline Seals 80 y o  female MRN: [id number]      Assessment:    Patient is a 80 yrs old pleasant woman with Hx of fall on Eliquis and baby aspirin  Patient came in with her daughter  Resides in nursing home at [facility]  Patient is here today for 2 weeks follow-up CT head without contrast- which demonstrates right posterior parafalcine region extending along the superior aspect of the tentorium and  Minimal sub dural blood above the left tentorium Pred this is slightly improved compared to the prior examination with no new hemorrhage  There is also solid soft tissue mass in the inferior aspect of the left CP angle now measuring 1 6 cm in maximum oblique dimension  May represent meningioma or nerve sheath tumor  Is increased in compared to MRI dated 11/18/2020  Patient had also persistent left forehead/supraorbital cephalhematoma estimated size of a lemon  Soft and nontender  Patient reports headache without nausea, vomiting, or visual issues  Denies bowel/bladder dysfunction  Baseline uses walker for ambulation  Exam- Patient Alert and communicates slowly, daughter helping in translation, slightly confused  PERRL, EOMI 2 mm conj bilaterally, moves all extremities  No drift bilaterally and finger-to-nose tests normal  both upper extremities  Strength 5/5 bilaterally and sensation to light touch intact throughout  DTR 2+ without clonus bilateral  Cephalhematoma noted on the left supraorbital region  Hx, PEx, and images reviewed with the patient and her daughter  Mx plan discussed  F/U in two weeks with CT head  Advised no AC/AP  Fall precaution  Advised if there is progression of neurological issues call office or go to emergency room  Questions and concerns were answered  Patient and daughter understands instructions and agreed with the plan  Plan:  1  CT head without contrast in 2 weeks  2  Advised No AC/AP  3   Call office if neurology symptoms progresses 4  Follow-up in 2 weeks      Subjective/Objective     Chief Complaint: " Weeks follow-up for traumatic subdural hemorrhage"      HPI  Patient is a 80years old pleasant lady with history of diabetes mellitus, hypertension, Afib- on Eliquis, syncope, brought here today with her daughter to go over the 2 weeks CT head without contrast for traumatic subdural hemorrhage  History of fall on Eliquis  Two weeks CT head without contrast follow-up findings as described in the assessment section above  Reports some headache but was difficult to get reliable information from her as she has some cognitive issues with confusion  Daughter states patient is feeling better  Denies any nausea, vomiting, blurry vision or weakness in the extremity  Gait instability at baseline and uses walker for ambulation  Denies taking AC/AP  Takes Tylenol for pain  ROS  Review of system personally reviewed and updated  Review of Systems   Constitutional: Negative  HENT: Positive for trouble swallowing  Eyes: Negative  Respiratory: Negative  Cardiovascular: Negative  Gastrointestinal: Positive for diarrhea (due to meds)  Endocrine: Negative  Genitourinary: Negative  Musculoskeletal: Positive for back pain (LBP radiates to left leg) and gait problem (uses walker for assistance)  Skin: Negative  Allergic/Immunologic: Negative  Neurological: Positive for dizziness, weakness and headaches  Hematological: Negative  Psychiatric/Behavioral: Positive for confusion and decreased concentration  Negative for sleep disturbance  All other systems reviewed and are negative        Family History    Family History   Problem Relation Age of Onset    Other Mother         natural causes     Rheumatic fever Father     No Known Problems Sister     Other Brother         natural causes     No Known Problems Sister     Other Brother         natural causes     Diabetes Daughter     Allergies Daughter     No Known Problems Daughter     No Known Problems Daughter     No Known Problems Daughter     No Known Problems Son        Social History    Social History     Socioeconomic History    Marital status:      Spouse name: Atul Oneill Number of children: 11    Years of education: Not on file    Highest education level: Not on file   Occupational History    Occupation:       Comment: Retired    Tobacco Use    Smoking status: Never Smoker    Smokeless tobacco: Never Used   Vaping Use    Vaping Use: Never used   Substance and Sexual Activity    Alcohol use: Not Currently    Drug use: Never    Sexual activity: Not Currently   Other Topics Concern    Not on file   Social History Narrative    Patient lives in an apartment     No basement     No dehumidifier     No air      No humidifier     Hardwood cherry in the bedroom     No air conditioning     Home is smoke free         No pets         Caffeine: 1 cup of coffee daily                     Hot tea occasionally     Chocolate consumed occasionally         Patient lives a lone      Social Determinants of Health     Financial Resource Strain: Medium Risk    Difficulty of Paying Living Expenses: Somewhat hard   Food Insecurity: Food Insecurity Present    Worried About Running Out of Food in the Last Year: Sometimes true    Lakshmi of Food in the Last Year: Sometimes true   Transportation Needs: No Transportation Needs    Lack of Transportation (Medical): No    Lack of Transportation (Non-Medical): No   Physical Activity: Insufficiently Active    Days of Exercise per Week: 7 days    Minutes of Exercise per Session: 20 min   Stress: Stress Concern Present    Feeling of Stress :  To some extent   Social Connections: Socially Isolated    Frequency of Communication with Friends and Family: More than three times a week    Frequency of Social Gatherings with Friends and Family: Never    Attends Congregational Services: Never    Active Member of Clubs or Organizations: No    Attends Club or Organization Meetings: Never    Marital Status:     Intimate Partner Violence: Not At Risk    Fear of Current or Ex-Partner: No    Emotionally Abused: No    Physically Abused: No    Sexually Abused: No       Past Medical History    Past Medical History:   Diagnosis Date    Arthritis     Diabetes ([address] )     Type 2    Glaucoma     Headache     Heartburn     Hypercholesteremia     Hypertension     Hypothyroidism     Migraines     Night sweats     Weight gain        Surgical History    Past Surgical History:   Procedure Laterality Date    CHOLECYSTECTOMY      TUBAL LIGATION      TUMOR REMOVAL Left     WISDOM TOOTH EXTRACTION         Medications      Current Outpatient Medications:     acetaminophen (TYLENOL) 325 mg tablet, Take 3 tablets (975 mg total) by mouth every 8 (eight) hours, Disp: , Rfl: 0    amLODIPine (NORVASC) 10 mg tablet, Take 1 tablet (10 mg total) by mouth daily, Disp: 90 tablet, Rfl: 3    ergocalciferol (VITAMIN D2) 50,000 units, Take 1 capsule (50,000 Units total) by mouth once a week, Disp: 12 capsule, Rfl: 2    glimepiride (AMARYL) 4 mg tablet, Take 1 tablet (4 mg total) by mouth 2 (two) times a day, Disp: 180 tablet, Rfl: 3    Lancets (freestyle) lancets, Use as instructed, Disp: 100 each, Rfl: 11    levothyroxine 25 mcg tablet, Take 1 tablet (25 mcg total) by mouth daily, Disp: 90 tablet, Rfl: 3    loperamide (IMODIUM A-D) 2 MG tablet, Take 2 mg by mouth 4 (four) times a day as needed for diarrhea, Disp: , Rfl:     melatonin 3 mg, Take 1 tablet (3 mg total) by mouth daily at bedtime, Disp: , Rfl: 0    metFORMIN (GLUCOPHAGE) 1000 MG tablet, Take 1,000 mg by mouth 2 (two) times a day with meals, Disp: , Rfl:     mirtazapine (REMERON) 7 5 MG tablet, Take 1 tablet (7 5 mg total) by mouth daily at bedtime x2 days then increase to 15 mg HS, Disp: , Rfl: 0    Allergies    No Known Allergies    The following portions of the patient's history were reviewed and updated as appropriate: allergies, current medications, past family history, past medical history, past social history, past surgical history and problem list     Investigations    I personally reviewed the CT results with the patient:    Findings as described in the assessment section above    Physical Exam    There were no vitals filed for this visit  Physical Exam  Constitutional:       Appearance: Normal appearance  HENT:      Head: Normocephalic and atraumatic  Eyes:      Extraocular Movements: Extraocular movements intact  Cardiovascular:      Rate and Rhythm: Normal rate  Pulses: Normal pulses  Pulmonary:      Effort: Pulmonary effort is normal    Musculoskeletal:         General: Normal range of motion  Cervical back: Normal range of motion  Neurological:      General: No focal deficit present  Mental Status: She is alert  GCS: GCS eye subscore is 4  GCS verbal subscore is 4  GCS motor subscore is 6  Cranial Nerves: Cranial nerves are intact  Motor: Motor function is intact  Coordination: Finger-Nose-Finger Test normal       Deep Tendon Reflexes: Reflexes are normal and symmetric  Reflex Scores:       Tricep reflexes are 2+ on the right side and 2+ on the left side  Bicep reflexes are 2+ on the right side and 2+ on the left side  Brachioradialis reflexes are 2+ on the right side and 2+ on the left side  Patellar reflexes are 2+ on the right side and 2+ on the left side  Achilles reflexes are 2+ on the right side and 2+ on the left side  Comments: Cognitive deficit       Neurologic Exam     Mental Status   Level of consciousness: alert    Cranial Nerves     CN III, IV, VI   Right pupil: Size: 2 mm  Shape: regular  Reactivity: brisk  Left pupil: Size: 2 mm  Shape: regular  Reactivity: brisk     Nystagmus: none     CN XI   CN XI normal      Motor Exam   Muscle bulk: normal  Overall muscle tone: normal  Right arm tone: normal  Left arm tone: normal  Right arm pronator drift: absent  Left arm pronator drift: absent  Right leg tone: normal  Left leg tone: normal    Sensory Exam   Light touch normal      Gait, Coordination, and Reflexes     Coordination   Finger to nose coordination: normal    Reflexes   Right brachioradialis: 2+  Left brachioradialis: 2+  Right biceps: 2+  Left biceps: 2+  Right triceps: 2+  Left triceps: 2+  Right patellar: 2+  Left patellar: 2+  Right achilles: 2+  Left achilles: 2+  Right : 2+  Left : 2+  Right Garcia: absent  Left Garcia: absent  Right ankle clonus: absent  Left pendular knee jerk: absent

## 2021-07-16 VITALS
RESPIRATION RATE: 18 BRPM | DIASTOLIC BLOOD PRESSURE: 80 MMHG | SYSTOLIC BLOOD PRESSURE: 131 MMHG | HEART RATE: 72 BPM | TEMPERATURE: 97.9 F | OXYGEN SATURATION: 98 %

## 2021-07-16 RX ORDER — ONDANSETRON 4 MG/1
4 TABLET, FILM COATED ORAL EVERY 6 HOURS PRN
COMMUNITY

## 2021-07-16 NOTE — ASSESSMENT & PLAN NOTE
· Status post traumatic subdural hematoma found  on CT 06/27/2021 due to recent fall  · Patient had been on Eliquis and aspirin  Aspirin and Eliquis subsequently discontinued  · Conservative treatment was recommended by neurosurgical team   · Patient had neuro appointment today  · Patient has under follow-up visit also in 2 weeks with follow-up CT in 2 weeks also  · Neurologist also wants patient to follow-up with cardiology regarding resumption of anticoagulants

## 2021-07-16 NOTE — ASSESSMENT & PLAN NOTE
· Blood pressures within the last few days have been appropriate for her age  · Today blood pressure is 131/80  · Continue amlodipine 10 mg daily  · Continue to monitor

## 2021-07-16 NOTE — PROGRESS NOTES
[facility]  [address]  [phone]  CODE [id number]    PROGRESS NOTE    Patients care was coordinated with nursing facility staff  Recent vitals, labs and updated medications were reviewed on [facility] system [facility]  Past Medical, surgical, social, medication and allergy history and patients previous records reviewed  Name: Rachel Huizar  AGE: 80 y o  SEX: female    MRN: [id number]    DATE OF ENCOUNTER:  07/15/2021    Patient Location      [address]    Reason For Visit/ Chief Complaint       1  Subdural hematoma (HCC)  Assessment & Plan:  · Status post traumatic subdural hematoma found  on CT 06/27/2021 due to recent fall  · Patient had been on Eliquis and aspirin  Aspirin and Eliquis subsequently discontinued  · Conservative treatment was recommended by neurosurgical team   · Patient had neuro appointment today  · Patient has under follow-up visit also in 2 weeks with follow-up CT in 2 weeks also  · Neurologist also wants patient to follow-up with cardiology regarding resumption of anticoagulants  2  Cephalohematoma  Assessment & Plan:  · Has not changed shins admission  · Bruising has slightly diminished  · Occasionally reports pain localized to area  · Continue Tylenol 975 mg every 8 hours for pain  3  Type 2 diabetes mellitus with other specified complication, without long-term current use of insulin (HCC)  Assessment & Plan:    Lab Results   Component Value Date    HGBA1C 10 2 (A) 06/09/2021   · Blood sugars have been stable and appropriate for her age  · Fasting blood sugar today was 132  · Continue metformin 1000 mg b i d   · Continue glimepiride 1 mg b i d   · Continue BBGs b i d        4  Essential hypertension  Assessment & Plan:  · Blood pressures within the last few days have been appropriate for her age  · Today blood pressure is 131/80    · Continue amlodipine 10 mg daily  · Continue to monitor  5  Other specified hypothyroidism  Assessment & Plan:  · Continue levothyroxine 25 mcg daily  6  Vitamin D deficiency  Assessment & Plan:  · Continue vitamin-D 64792 units weekly  TERRIE Muñoz is a 80 y o  female who is being seen for a follow-up visit  She had returned from Neurology appointment today with her daughter  She appears to be slightly confused  Currently nurses report no acute issues  Vital signs were reviewed and appear to be within normal limits  Recent labs have been reviewed  On examination patient appears pleasantly confused  She appears to be in no discomfort or pain  Appetite is reported to be okay  No reports of fever, chills, nausea, vomiting, abdominal pain, chest pain, palpitations, increased shortness of breath, or problems with voiding  Review of Systems   Constitutional: Positive for fatigue  Negative for chills and fever  HENT: Negative for nosebleeds and rhinorrhea  Eyes: Negative for discharge and redness  Respiratory: Negative for cough, chest tightness, shortness of breath, wheezing and stridor  Cardiovascular: Negative for chest pain and leg swelling  Gastrointestinal: Negative for abdominal distention, abdominal pain, diarrhea and vomiting  Genitourinary: Negative for dysuria, flank pain and hematuria  Musculoskeletal: Positive for gait problem  Negative for arthralgias and back pain  Skin: Negative for pallor  Neurological: Positive for weakness (Generalized) and headaches  Negative for dizziness, tremors, seizures and syncope  Psychiatric/Behavioral: Negative for agitation, behavioral problems and confusion         Past Medical History:   Diagnosis Date    Arthritis     Diabetes ([facility] [address] )     Type 2    Glaucoma     Headache     Heartburn     Hypercholesteremia     Hypertension     Hypothyroidism     Migraines     Night sweats     Weight gain        Past Surgical History:   Procedure Laterality Date    CHOLECYSTECTOMY      TUBAL LIGATION      TUMOR REMOVAL Left     WISDOM TOOTH EXTRACTION         Social History     Social History     Tobacco Use   Smoking Status Never Smoker   Smokeless Tobacco Never Used       Social History     Substance and Sexual Activity   Alcohol Use Not Currently       Family History:   Family History   Problem Relation Age of Onset    Other Mother         natural causes     Rheumatic fever Father     No Known Problems Sister     Other Brother         natural causes     No Known Problems Sister     Other Brother         natural causes     Diabetes Daughter     Allergies Daughter     No Known Problems Daughter     No Known Problems Daughter     No Known Problems Daughter     No Known Problems Son        No Known Allergies    Medications       Current Outpatient Medications:     acetaminophen (TYLENOL) 325 mg tablet, Take 3 tablets (975 mg total) by mouth every 8 (eight) hours, Disp: , Rfl: 0    amLODIPine (NORVASC) 10 mg tablet, Take 1 tablet (10 mg total) by mouth daily, Disp: 90 tablet, Rfl: 3    ergocalciferol (VITAMIN D2) 50,000 units, Take 1 capsule (50,000 Units total) by mouth once a week, Disp: 12 capsule, Rfl: 2    glimepiride (AMARYL) 4 mg tablet, Take 1 tablet (4 mg total) by mouth 2 (two) times a day, Disp: 180 tablet, Rfl: 3    levothyroxine 25 mcg tablet, Take 1 tablet (25 mcg total) by mouth daily, Disp: 90 tablet, Rfl: 3    loperamide (IMODIUM A-D) 2 MG tablet, Take 2 mg by mouth 4 (four) times a day as needed for diarrhea, Disp: , Rfl:     melatonin 3 mg, Take 1 tablet (3 mg total) by mouth daily at bedtime, Disp: , Rfl: 0    metFORMIN (GLUCOPHAGE) 1000 MG tablet, Take 1,000 mg by mouth 2 (two) times a day with meals, Disp: , Rfl:     mirtazapine (REMERON) 7 5 MG tablet, Take 1 tablet (7 5 mg total) by mouth daily at bedtime x2 days then increase to 15 mg HS, Disp: , Rfl: 0    ondansetron (ZOFRAN) 4 mg tablet, Take 4 mg by mouth every 6 (six) hours as needed for nausea or vomiting, Disp: , Rfl:     Lancets (freestyle) lancets, Use as instructed, Disp: 100 each, Rfl: 11    Updated list was reviewed in [address] [place] [address]  Vitals:    07/15/21 1019   BP: 131/80   Pulse: 72   Resp: 18   Temp: 97 9 °F (36 6 °C)   SpO2: 98%       Physical Exam  Constitutional:       General: She is not in acute distress  Appearance: She is well-developed  She is not diaphoretic  HENT:      Head: Normocephalic and atraumatic  Nose: Nose normal  No rhinorrhea  Mouth/Throat:      Mouth: Mucous membranes are moist    Eyes:      General: No scleral icterus  Right eye: No discharge  Left eye: No discharge  Conjunctiva/sclera: Conjunctivae normal    Cardiovascular:      Rate and Rhythm: Normal rate  Heart sounds: No murmur heard  Pulmonary:      Effort: Pulmonary effort is normal  No respiratory distress  Breath sounds: Normal breath sounds  No stridor  No wheezing or rales  Abdominal:      General: Bowel sounds are normal  There is no distension  Palpations: Abdomen is soft  Tenderness: There is no abdominal tenderness  There is no guarding  Musculoskeletal:         General: No deformity  Normal range of motion  Cervical back: Normal range of motion  No rigidity or tenderness  Skin:     General: Skin is warm and dry  Coloration: Skin is not jaundiced  Findings: Bruising present  Comments: Hematoma on left forehead with periorbital bruising  Neurological:      General: No focal deficit present  Mental Status: She is alert  Motor: Weakness present  Psychiatric:         Mood and Affect: Mood normal          Behavior: Behavior normal          Diagnostic Data     Recent labs were reviewed   07/15/2021-Hgb 12 9, Hct 37 3, WBC 5 8    Additional Notes      Patients care was coordinated with nursing facility staff   Recent vitals, labs and updated medications were reviewed on [facility]  Past Medical, surgical, social, medication and allergy history and patients previous records reviewed   **Please note this f/u this note was constructed using a voice recognition system  **  This was electronically signed by YA Stanton

## 2021-07-16 NOTE — ASSESSMENT & PLAN NOTE
· Has not changed shins admission  · Bruising has slightly diminished  · Occasionally reports pain localized to area  · Continue Tylenol 975 mg every 8 hours for pain

## 2021-07-16 NOTE — ASSESSMENT & PLAN NOTE
Lab Results   Component Value Date    HGBA1C 10 2 (A) 06/09/2021   · Blood sugars have been stable and appropriate for her age  · Fasting blood sugar today was 132  · Continue metformin 1000 mg b i d   · Continue glimepiride 1 mg b i d   · Continue BBGs b i d

## 2021-07-21 ENCOUNTER — NURSING HOME VISIT (OUTPATIENT)
Dept: GERIATRICS | Facility: OTHER | Age: 86
End: 2021-07-21
Payer: MEDICARE

## 2021-07-21 VITALS
OXYGEN SATURATION: 98 % | HEART RATE: 70 BPM | SYSTOLIC BLOOD PRESSURE: 128 MMHG | TEMPERATURE: 97.5 F | DIASTOLIC BLOOD PRESSURE: 80 MMHG | RESPIRATION RATE: 18 BRPM

## 2021-07-21 DIAGNOSIS — E11.65 UNCONTROLLED TYPE 2 DIABETES MELLITUS WITH HYPERGLYCEMIA (HCC): ICD-10-CM
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
## 2021-07-21 DIAGNOSIS — S06.5X9A SUBDURAL HEMATOMA (HCC): Primary | ICD-10-CM

## 2021-07-21 DIAGNOSIS — Z86.79 HISTORY OF ATRIAL FIBRILLATION: ICD-10-CM

## 2021-07-21 DIAGNOSIS — G47.09 OTHER INSOMNIA: ICD-10-CM

## 2021-07-21 DIAGNOSIS — M25.562 LEFT KNEE PAIN, UNSPECIFIED CHRONICITY: ICD-10-CM

## 2021-07-21 DIAGNOSIS — E55.9 VITAMIN D DEFICIENCY: ICD-10-CM

## 2021-07-21 DIAGNOSIS — F32.A DEPRESSION, UNSPECIFIED DEPRESSION TYPE: ICD-10-CM

## 2021-07-21 PROCEDURE — 99309 SBSQ NF CARE MODERATE MDM 30: CPT | Performed by: NURSE PRACTITIONER

## 2021-07-21 NOTE — ASSESSMENT & PLAN NOTE
· Heart rate controlled  · Patient is currently not on any anticoagulants status post fall hematoma  · Follow-up with cardiology to resume medications necessary

## 2021-07-21 NOTE — PROGRESS NOTES
[facility]  [address] [id number]    PROGRESS NOTE    Patients care was coordinated with nursing facility staff  Recent [facility]als, labs and [facility]  Past Medical, surgical, social, medication and allergy history and patients previous records reviewed  Name: Hattie Bradshaw  AGE: 80 y o  SEX: female    MRN: [id number]    DATE OF ENCOUNTER:  07/21/2021     Patient Location      [address]    Reason For Visit/ Chief Complaint       1  Subdural hematoma (HCC)  Assessment & Plan:  · s/p traumatic subdural hematoma found on CT on 07/20/2021 due to recent fall  · Patient was on Eliquis and aspirin at the time which have subsequently been discontinued  · Conservative treatment recommended by nurse surgery  · Neurologist wants follow-up with Cardiology to resume anticoagulants  Patient had office visit with Neurosurgery  · Patient had CT of the head on 07/12/20212 weeks follow-up CT head without contrast- which demonstrates right posterior parafalcine region extending along the superior aspect of the tentorium and  Minimal sub dural blood above the left tentorium Pred this is slightly improved compared to the prior examination with no new hemorrhage  There is also solid soft tissue mass in the inferior aspect of the left CP angle now measuring 1 6 cm in maximum oblique dimension  May represent meningioma or nerve sheath tumor  Is increased in compared to MRI dated 11/18/2020  Patient had also persistent left forehead/supraorbital cephalhematoma  2  Cephalohematoma  Assessment & Plan:  · See note above      3  Left knee pain, unspecified chronicity  Assessment & Plan:  · ROM WNL  · Discussed with Dr Veronica Lynn who will be in to see patient         4  History of atrial fibrillation  Assessment & Plan:  · Heart rate controlled  · Patient is currently not on any anticoagulants status post fall hematoma  · Follow-up with cardiology to resume medications necessary  5  Uncontrolled type 2 diabetes mellitus with hyperglycemia (HCC)  Assessment & Plan:    Lab Results   Component Value Date    HGBA1C 10 2 (A) 06/09/2021   · Last Hgb A1c was 10 2-uncontrolled  · Fasting blood sugar this morning was 132  · Blood sugars been mostly under 200  · Continue metformin 1000 mg b i d   · Continue glimepiride 4 mg b i d   · Continue to monitor blood sugars daily  6  Depression, unspecified depression type  Assessment & Plan:  · Stable  No behaviors have been reported  · Continue mirtazapine 7 5 mg qhs       7  Other insomnia  Assessment & Plan:  · Continue melatonin 3 mg q h s  P      8  Vitamin D deficiency  Assessment & Plan:  · Continue ergocalciferol 67326 units weekly  5  Fall, subsequent encounter  Assessment & Plan:  · Continue PT/OT  · Fall precautions  · Continue supportive care  HPI      Edgar Bulter is a 80 y o  female who is patient in short term rehabilitation and is being seen for an follow-up visit  Nurses currently report no acute issues  Vital signs are stable  Recent labs have also been reviewed  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 and in the company of other patients  She is complaining of left knee pain  Range of motion is in normal limits  No excess swelling is noted currently  Patient reports no falls or injuries of area  She reports appetite is okay  No reports of fever, chills, nausea, vomiting, abdominal pain, chest pain,  palpitations, increased shortness of breath, or problems with voiding  Review of Systems   Constitutional: Positive for fatigue  Negative for chills and fever  HENT: Negative for nosebleeds and rhinorrhea  Eyes: Negative for discharge and redness     Respiratory: Negative for cough, chest tightness, shortness of breath, wheezing and stridor  Cardiovascular: Negative for chest pain and leg swelling  Gastrointestinal: Negative for abdominal distention, abdominal pain, diarrhea, nausea and vomiting  Genitourinary: Negative for difficulty urinating, dysuria, flank pain and hematuria  Musculoskeletal: Positive for arthralgias, gait problem and myalgias  Negative for back pain  Skin: Negative for pallor  Neurological: Positive for weakness (Generalized)  Negative for tremors, seizures, syncope and headaches  Psychiatric/Behavioral: Negative for agitation, behavioral problems and confusion         Past Medical History:   Diagnosis Date    Arthritis     Diabetes ([facility] [address] )     Type 2    Glaucoma     Headache     Heartburn     Hypercholesteremia     Hypertension     Hypothyroidism     Migraines     Night sweats     Weight gain        Past Surgical History:   Procedure Laterality Date    CHOLECYSTECTOMY      TUBAL LIGATION      TUMOR REMOVAL Left     WISDOM TOOTH EXTRACTION         Social History     Social History     Tobacco Use   Smoking Status Never Smoker   Smokeless Tobacco Never Used       Social History     Substance and Sexual Activity   Alcohol Use Not Currently       Family History:   Family History   Problem Relation Age of Onset    Other Mother         natural causes     Rheumatic fever Father     No Known Problems Sister     Other Brother         natural causes     No Known Problems Sister     Other Brother         natural causes     Diabetes Daughter     Allergies Daughter     No Known Problems Daughter     No Known Problems Daughter     No Known Problems Daughter     No Known Problems Son        No Known Allergies    Medications       Current Outpatient Medications:     acetaminophen (TYLENOL) 325 mg tablet, Take 3 tablets (975 mg total) by mouth every 8 (eight) hours, Disp: , Rfl: 0    amLODIPine (NORVASC) 10 mg tablet, Take 1 tablet (10 mg total) by mouth daily, Disp: 90 tablet, Rfl: 3   ergocalciferol (VITAMIN D2) 50,000 units, Take 1 capsule (50,000 Units total) by mouth once a week, Disp: 12 capsule, Rfl: 2    glimepiride (AMARYL) 4 mg tablet, Take 1 tablet (4 mg total) by mouth 2 (two) times a day, Disp: 180 tablet, Rfl: 3    levothyroxine 25 mcg tablet, Take 1 tablet (25 mcg total) by mouth daily, Disp: 90 tablet, Rfl: 3    loperamide (IMODIUM A-D) 2 MG tablet, Take 2 mg by mouth 4 (four) times a day as needed for diarrhea, Disp: , Rfl:     melatonin 3 mg, Take 1 tablet (3 mg total) by mouth daily at bedtime, Disp: , Rfl: 0    metFORMIN (GLUCOPHAGE) 1000 MG tablet, Take 1,000 mg by mouth 2 (two) times a day with meals, Disp: , Rfl:     mirtazapine (REMERON) 7 5 MG tablet, Take 1 tablet (7 5 mg total) by mouth daily at bedtime x2 days then increase to 15 mg HS, Disp: , Rfl: 0    ondansetron (ZOFRAN) 4 mg tablet, Take 4 mg by mouth every 6 (six) hours as needed for nausea or vomiting, Disp: , Rfl:     Lancets (freestyle) lancets, Use as instructed, Disp: 100 each, Rfl: 11    Updated list was reviewed in [facility] system of facility  Vitals:    07/21/21 1055   BP: 128/80   Pulse: 70   Resp: 18   Temp: 97 5 °F (36 4 °C)   SpO2: 98%       Physical Exam  Vitals and nursing note reviewed  HENT:      Nose: Nose normal  No rhinorrhea  Cardiovascular:      Rate and Rhythm: Normal rate and regular rhythm  Heart sounds: Murmur heard  Pulmonary:      Effort: Pulmonary effort is normal  No respiratory distress  Breath sounds: No wheezing or rales  Abdominal:      General: There is no distension  Palpations: Abdomen is soft  Tenderness: There is no abdominal tenderness  There is no guarding  Musculoskeletal:         General: No swelling  Normal range of motion  Cervical back: Normal range of motion and neck supple  No tenderness  Right lower leg: No edema  Left lower leg: No edema  Skin:     General: Skin is warm        Coloration: Skin is not jaundiced  Findings: No bruising  Comments: Large hematoma on left forehead  Periorbital bruising improving  Neurological:      General: No focal deficit present  Motor: Weakness present  Gait: Gait abnormal    Psychiatric:         Mood and Affect: Mood normal          Diagnostic Data     Recent labs were reviewed   Nurse surgical record reviewed   CT results reviewed   07/19/2021--Hgb 13 1, Hct 38 6, WBC 5 6, BUN 15, , K 3 7, Cr 0 59    Additional Notes      Patients care was coordinated with nursing facility staff  Recent vitals, labs and [facility] medications [facility]  Past Medical, surgical, social, medication and allergy history and patients previous records reviewed   **Please note this f/u this note was constructed using a voice recognition system  **  This was electronically signed by YA Diego

## 2021-07-21 NOTE — ASSESSMENT & PLAN NOTE
· s/p traumatic subdural hematoma found on CT on 07/20/2021 due to recent fall  · Patient was on Eliquis and aspirin at the time which have subsequently been discontinued  · Conservative treatment recommended by nurse surgery  · Neurologist wants follow-up with Cardiology to resume anticoagulants  Patient had office visit with Neurosurgery  · Patient had CT of the head on 07/12/20212 weeks follow-up CT head without contrast- which demonstrates right posterior parafalcine region extending along the superior aspect of the tentorium and  Minimal sub dural blood above the left tentorium Pred this is slightly improved compared to the prior examination with no new hemorrhage  There is also solid soft tissue mass in the inferior aspect of the left CP angle now measuring 1 6 cm in maximum oblique dimension  May represent meningioma or nerve sheath tumor  Is increased in compared to MRI dated 11/18/2020  Patient had also persistent left forehead/supraorbital cephalhematoma

## 2021-07-21 NOTE — ASSESSMENT & PLAN NOTE
Lab Results   Component Value Date    HGBA1C 10 2 (A) 06/09/2021   · Last Hgb A1c was 10 2-uncontrolled  · Fasting blood sugar this morning was 132  · Blood sugars been mostly under 200  · Continue metformin 1000 mg b i d   · Continue glimepiride 4 mg b i d   · Continue to monitor blood sugars daily

## 2021-07-26 ENCOUNTER — TELEPHONE (OUTPATIENT)
Dept: FAMILY MEDICINE CLINIC | Facility: CLINIC | Age: 86
End: 2021-07-26

## 2021-07-28 ENCOUNTER — OFFICE VISIT (OUTPATIENT)
Dept: FAMILY MEDICINE CLINIC | Facility: CLINIC | Age: 86
End: 2021-07-28
Payer: MEDICARE

## 2021-07-28 VITALS
BODY MASS INDEX: 28.83 KG/M2 | HEART RATE: 74 BPM | OXYGEN SATURATION: 96 % | SYSTOLIC BLOOD PRESSURE: 132 MMHG | RESPIRATION RATE: 16 BRPM | HEIGHT: 59 IN | DIASTOLIC BLOOD PRESSURE: 78 MMHG | TEMPERATURE: 99.2 F | WEIGHT: 143 LBS

## 2021-07-28 DIAGNOSIS — I10 BENIGN ESSENTIAL HYPERTENSION: ICD-10-CM

## 2021-07-28 DIAGNOSIS — S06.5X9A SUBDURAL HEMATOMA (HCC): ICD-10-CM

## 2021-07-28 DIAGNOSIS — R42 DIZZINESS: ICD-10-CM

## 2021-07-28 DIAGNOSIS — E11.8 TYPE 2 DIABETES MELLITUS WITH UNSPECIFIED COMPLICATIONS (HCC): ICD-10-CM

## 2021-07-28 DIAGNOSIS — G47.09 OTHER INSOMNIA: ICD-10-CM

## 2021-07-28 DIAGNOSIS — F32.1 CURRENT MODERATE EPISODE OF MAJOR DEPRESSIVE DISORDER WITHOUT PRIOR EPISODE (HCC): ICD-10-CM

## 2021-07-28 DIAGNOSIS — K90.9 DIARRHEA DUE TO MALABSORPTION: ICD-10-CM

## 2021-07-28 DIAGNOSIS — E11.9 TYPE 2 DIABETES MELLITUS WITHOUT COMPLICATION, WITHOUT LONG-TERM CURRENT USE OF INSULIN (HCC): ICD-10-CM

## 2021-07-28 DIAGNOSIS — R19.7 DIARRHEA DUE TO MALABSORPTION: ICD-10-CM

## 2021-07-28 DIAGNOSIS — I48.0 PAROXYSMAL ATRIAL FIBRILLATION (HCC): ICD-10-CM

## 2021-07-28 DIAGNOSIS — E55.9 VITAMIN D DEFICIENCY: ICD-10-CM

## 2021-07-28 DIAGNOSIS — S00.03XS HEMATOMA OF FRONTAL SCALP, SEQUELA: ICD-10-CM

## 2021-07-28 DIAGNOSIS — I10 ESSENTIAL HYPERTENSION: ICD-10-CM

## 2021-07-28 DIAGNOSIS — F02.80 DEMENTIA ASSOCIATED WITH OTHER UNDERLYING DISEASE WITHOUT BEHAVIORAL DISTURBANCE (HCC): ICD-10-CM

## 2021-07-28 DIAGNOSIS — R29.898 WEAKNESS OF BOTH LOWER EXTREMITIES: ICD-10-CM

## 2021-07-28 DIAGNOSIS — E11.65 UNCONTROLLED TYPE 2 DIABETES MELLITUS WITH HYPERGLYCEMIA (HCC): ICD-10-CM

## 2021-07-28 DIAGNOSIS — E11.69 TYPE 2 DIABETES MELLITUS WITH OTHER SPECIFIED COMPLICATION, WITHOUT LONG-TERM CURRENT USE OF INSULIN (HCC): Primary | ICD-10-CM

## 2021-07-28 DIAGNOSIS — E03.8 OTHER SPECIFIED HYPOTHYROIDISM: ICD-10-CM

## 2021-07-28 DIAGNOSIS — E78.2 MIXED HYPERLIPIDEMIA: ICD-10-CM

## 2021-07-28 LAB
SL AMB POCT GLUCOSE BLD: 240
SL AMB POCT HEMOGLOBIN AIC: 7.8 (ref ?–6.5)

## 2021-07-28 PROCEDURE — 82948 REAGENT STRIP/BLOOD GLUCOSE: CPT | Performed by: NURSE PRACTITIONER

## 2021-07-28 PROCEDURE — 83036 HEMOGLOBIN GLYCOSYLATED A1C: CPT | Performed by: NURSE PRACTITIONER

## 2021-07-28 PROCEDURE — 99215 OFFICE O/P EST HI 40 MIN: CPT | Performed by: NURSE PRACTITIONER

## 2021-07-28 RX ORDER — GLIMEPIRIDE 4 MG/1
4 TABLET ORAL 2 TIMES DAILY
Qty: 180 TABLET | Refills: 3 | Status: SHIPPED | OUTPATIENT
Start: 2021-07-28 | End: 2021-12-02 | Stop reason: HOSPADM

## 2021-07-28 RX ORDER — LEVOTHYROXINE SODIUM 0.03 MG/1
25 TABLET ORAL DAILY
Qty: 90 TABLET | Refills: 3 | Status: SHIPPED | OUTPATIENT
Start: 2021-07-28 | End: 2022-03-14

## 2021-07-28 RX ORDER — LOPERAMIDE HYDROCHLORIDE 2 MG/1
2 TABLET ORAL 4 TIMES DAILY PRN
Qty: 30 TABLET | Refills: 0 | Status: SHIPPED | OUTPATIENT
Start: 2021-07-28

## 2021-07-28 RX ORDER — DULAGLUTIDE 0.75 MG/.5ML
0.75 INJECTION, SOLUTION SUBCUTANEOUS WEEKLY
Qty: 0.5 ML | Refills: 6 | Status: SHIPPED | OUTPATIENT
Start: 2021-07-28 | End: 2021-08-25

## 2021-07-28 RX ORDER — ERGOCALCIFEROL 1.25 MG/1
50000 CAPSULE ORAL WEEKLY
Qty: 12 CAPSULE | Refills: 2 | Status: SHIPPED | OUTPATIENT
Start: 2021-07-28

## 2021-07-28 RX ORDER — AMLODIPINE BESYLATE 10 MG/1
10 TABLET ORAL DAILY
Qty: 90 TABLET | Refills: 3 | Status: SHIPPED | OUTPATIENT
Start: 2021-07-28 | End: 2022-03-14

## 2021-07-28 RX ORDER — MIRTAZAPINE 7.5 MG/1
7.5 TABLET, FILM COATED ORAL
Refills: 0
Start: 2021-07-28

## 2021-07-28 NOTE — ASSESSMENT & PLAN NOTE
Patient currently on Remeron 7 5 mg p o  Q h s   Currently she indicates that she is not depressed while being on the medication

## 2021-07-28 NOTE — ASSESSMENT & PLAN NOTE
Patient has not been able to maintain her metformin routinely  She is also on Amaryl 4 mg 2 times a day  Currently she is to start Trulicity (samples provided) and try to maintain her Glucophage and amaryl if possible  Lab Results   Component Value Date    HGBA1C 10 2 (A) 06/09/2021   Will have follow-up evaluation in 4 weeks  Will check hemoglobin A1c this visit and repeat in 4 weeks for re-evaluate

## 2021-07-28 NOTE — ASSESSMENT & PLAN NOTE
Patient has difficulty maintaining Glucophage at this time  Currently placed on Trulicity 0 7 NX/3 5 mL subcu 1 time weekly  Will continue for the next 4 weeks and follow-up at that time    Lab Results   Component Value Date    HGBA1C 10 2 (A) 06/09/2021

## 2021-07-28 NOTE — PATIENT INSTRUCTIONS
R  I C E  Treatment   WHAT YOU NEED TO KNOW:   What is R I C E  treatment?  R I C E  treatment is a 4-step process used to decrease swelling and pain caused by an injury  R I C E  stands for rest, ice, compression, and elevation  R I C E  should be done within 24 to 48 hours after an injury  How do I use R I C E  treatment? · Rest  your injured area as directed  You may need to stop using, or keep weight off, the injury for 48 hours or longer  Your healthcare provider may recommend crutches or another device  Return to your usual activities as directed  · Apply ice  on your injured area for 15 to 20 minutes every 4 hours or as directed  Use an ice pack, or put crushed ice in a plastic bag  Cover it with a towel  Ice helps prevent tissue damage and decreases swelling and pain  · Compress , or keep pressure on, the injured area  Compression will help decrease swelling and support the injured area  Use an elastic bandage, air stirrup, splint, or sling as directed  If you use an elastic bandage to wrap your injured area, make sure the bandage is not too tight  · Elevate  the injured area above the level of your heart as often as you can  This will help decrease swelling and pain  Prop the injured area on pillows or blankets to keep it elevated comfortably  When should I seek immediate care? · Your pain is severe  · You have severe swelling or deformity  · You have numbness in the injured area  When should I contact my healthcare provider? · Your pain and swelling does not go away after a few days  · You have questions or concerns about your condition or care  CARE AGREEMENT:   You have the right to help plan your care  Learn about your health condition and how it may be treated  Discuss treatment options with your healthcare providers to decide what care you want to receive  You always have the right to refuse treatment  The above information is an  only   It is not intended as medical advice for individual conditions or treatments  Talk to your doctor, nurse or pharmacist before following any medical regimen to see if it is safe and effective for you  © Copyright [employer] 2021 Information is for End User's use only and may not be sold, redistributed or otherwise used for commercial purposes  All illustrations and images included in CareNotes® are the copyrighted property of [employer]  or Cristiano Posey (By injection)   Dulaglutide (doo-la-GLOO-tide)  Treats type 2 diabetes  Also lowers risk of death, heart attack, or stroke in patients with diabetes and heart or blood vessel problems  Brand Name(s): Trulicity   There may be other brand names for this medicine  When This Medicine Should Not Be Used: This medicine is not right for everyone  Do not use it if you had an allergic reaction to dulaglutide, you have multiple endocrine neoplasia syndrome type 2 (MEN 2), or you or anyone in your family has had medullary thyroid cancer  How to Use This Medicine:   Injectable  · Your doctor will prescribe your exact dose  This medicine is usually given once a week, on the same day of the week  It is given as a shot under the skin of your stomach, thighs, or upper arms  · You may be taught how to give your medicine at home  Make sure you understand all instructions before giving yourself an injection  Do not use more medicine or use it more often than your doctor tells you to  · If you use insulin in addition to this medicine, do not mix them in the same syringe  You may give the shots in the same area (including the stomach), but do not give the shots right next to each other  · If the medicine in the pen or prefilled syringe has changed color, looks cloudy, or has particles in it, do not use it  · You will be shown the body areas where this shot can be given  Use a different body area each time you give yourself a shot   Keep track of where you give each shot to make sure you rotate body areas  · Use a new needle and syringe each time you inject your medicine  · This medicine should come with a Medication Guide  Ask your pharmacist for a copy if you do not have one  · Missed dose: If you miss a dose, use it as soon as possible within 3 days after your missed dose  If you miss a dose by more than 3 days, wait until your next regular weekly dose  · Store your medicine pens or prefilled syringes in the refrigerator and keep them in the original carton  Protect the pens from light  You may also store the pens at room temperature for up to 14 days  Do not freeze the medicine, and do not use the medicine if it has been frozen  Drugs and Foods to Avoid:   Ask your doctor or pharmacist before using any other medicine, including over-the-counter medicines, vitamins, and herbal products  · Some medicines can affect how dulaglutide works  Tell your doctor if you are using warfarin, insulin, or other diabetes medicine  Warnings While Using This Medicine:   · Tell your doctor if you are pregnant or breastfeeding, or if you have kidney disease or a history of diabetic retinopathy or pancreas problems  Tell your doctor if you have severe digestion problems (including gastroparesis) or stomach or bowel problems  · This medicine may cause the following problems:  ? Increased risk of thyroid tumor  ? Pancreatitis (swelling of the pancreas)  ? Low blood sugar (more likely if you also take insulin or other diabetes medicine)  · Your doctor will do lab tests at regular visits to check on the effects of this medicine  Keep all appointments  · Keep all medicine out of the reach of children  Never share your medicine with anyone  Do not share needles or pens because you can spread an infection    Possible Side Effects While Using This Medicine:   Call your doctor right away if you notice any of these side effects:  · Allergic reaction: Itching or hives, swelling in your face or hands, swelling or tingling in your mouth or throat, chest tightness, trouble breathing  · Blurred vision, changes in vision  · Change in how much or how often you urinate  · Lump or swelling in your neck, trouble breathing or swallowing  · Shaking, trembling, sweating, fast or pounding heartbeat, lightheadedness, hunger, confusion  · Sudden and severe stomach pain, nausea, vomiting, fever, and lightheadedness  If you notice these less serious side effects, talk with your doctor:   · Diarrhea  · Redness, itching, swelling, or any changes in your skin where the shot was given  If you notice other side effects that you think are caused by this medicine, tell your doctor  Call your doctor for medical advice about side effects  You may report side effects to FDA at 3-075-FDA-7670  © Copyright [employer] 2021 Information is for End User's use only and may not be sold, redistributed or otherwise used for commercial purposes  The above information is an  only  It is not intended as medical advice for individual conditions or treatments  Talk to your doctor, nurse or pharmacist before following any medical regimen to see if it is safe and effective for you

## 2021-07-28 NOTE — ASSESSMENT & PLAN NOTE
Patient noted to have subdural hematoma on CT  Left frontal hematoma noted remains swollen at this time  7 cm x 5 cm x 1 5 cm to forehead with ecchymosis  Patient ordered for general plastic surgery consultation for evacuation  No neurological deficits noted at this time

## 2021-07-28 NOTE — PROGRESS NOTES
BMI Counseling: Body mass index is 28 88 kg/m²  The BMI is above normal  Nutrition recommendations include decreasing portion sizes, encouraging healthy choices of fruits and vegetables, decreasing fast food intake, consuming healthier snacks, limiting drinks that contain sugar, moderation in carbohydrate intake, increasing intake of lean protein, reducing intake of saturated and trans fat and reducing intake of cholesterol  Exercise recommendations include vigorous physical activity 75 minutes/week, exercising 3-5 times per week, obtaining a gym membership and strength training exercises  No pharmacotherapy was ordered  Depression Screening and Follow-up Plan: Clincally patient does not have depression  No treatment is required  Falls Plan of Care: referral to physical therapy and balance, strength, and gait training instructions were provided  Recommended assistive device to help with gait and balance  Medications that increase falls were reviewed  Vitamin D supplementation was recommended  Home safety evaluation by OT recommended  Home safety education provided  Assessment/Plan:         Problem List Items Addressed This Visit        Endocrine    DM (diabetes mellitus), type 2, uncontrolled ([facility] [address] )     Patient has not been able to maintain her metformin routinely  She is also on Amaryl 4 mg 2 times a day  Currently she is to start Trulicity (samples provided) and try to maintain her Glucophage and amaryl if possible  Lab Results   Component Value Date    HGBA1C 10 2 (A) 06/09/2021   Will have follow-up evaluation in 4 weeks  Will check hemoglobin A1c this visit and repeat in 4 weeks for re-evaluate  Relevant Medications    Dulaglutide (Trulicity) 7 15 ZV/7 5CC SOPN    metFORMIN (GLUCOPHAGE) 500 mg tablet    glimepiride (AMARYL) 4 mg tablet    Diabetes mellitus ([facility] [address] ) - Primary     Patient has difficulty maintaining Glucophage at this time    Currently placed on Trulicity 0 7 XX/6 9 mL subcu 1 time weekly  Will continue for the next 4 weeks and follow-up at that time  Lab Results   Component Value Date    HGBA1C 10 2 (A) 06/09/2021            Relevant Medications    Dulaglutide (Trulicity) 6 81 MG/4 1WR SOPN    levothyroxine 25 mcg tablet    metFORMIN (GLUCOPHAGE) 500 mg tablet    glimepiride (AMARYL) 4 mg tablet    Other specified hypothyroidism     Patient currently taking levothyroxine 25 mcg p o  Daily  Relevant Medications    levothyroxine 25 mcg tablet    Other Relevant Orders    TSH, 3rd generation with Free T4 reflex       Cardiovascular and Mediastinum    Essential hypertension    Relevant Medications    amLODIPine (NORVASC) 10 mg tablet    levothyroxine 25 mcg tablet    Paroxysmal atrial fibrillation (HCC)     Patient currently maintained on Eliquis 5 mg p o  B i d  Her daughter indicates that she restarted on the medication after discharge from the hospital 07/20/2021  Relevant Medications    amLODIPine (NORVASC) 10 mg tablet       Nervous and Auditory    Subdural hematoma (HCC)     Patient noted to have subdural hematoma on CT  Left frontal hematoma noted remains swollen at this time  7 cm x 5 cm x 1 5 cm to forehead with ecchymosis  Patient ordered for general plastic surgery consultation for evacuation  No neurological deficits noted at this time  Dementia associated with other underlying disease without behavioral disturbance (HCC)    Relevant Medications    mirtazapine (REMERON) 7 5 MG tablet       Other    Mixed hyperlipidemia    Relevant Medications    amLODIPine (NORVASC) 10 mg tablet    levothyroxine 25 mcg tablet    Body mass index (BMI) of 28 0 to 28 9 in adult     Patient's BMI remains unchanged at this time  Diet and nutrition counseling provided           Relevant Medications    amLODIPine (NORVASC) 10 mg tablet    levothyroxine 25 mcg tablet    Vitamin D deficiency    Relevant Medications    ergocalciferol (VITAMIN D2) 50,000 units    Other insomnia    Relevant Medications    mirtazapine (REMERON) 7 5 MG tablet    Depression     Patient currently on Remeron 7 5 mg p o  Q h s   Currently she indicates that she is not depressed while being on the medication  Relevant Medications    mirtazapine (REMERON) 7 5 MG tablet    Diarrhea    Relevant Medications    loperamide (IMODIUM A-D) 2 MG tablet      Other Visit Diagnoses     Dizziness        Relevant Orders    POCT hemoglobin A1c    Hematoma of frontal scalp, sequela        Relevant Orders    Ambulatory referral to Plastic Surgery    Weakness of both lower extremities        Relevant Orders    Wheelchair    Benign essential hypertension        Relevant Medications    amLODIPine (NORVASC) 10 mg tablet    levothyroxine 25 mcg tablet    BMI 29 0-29 9,adult        Relevant Medications    amLODIPine (NORVASC) 10 mg tablet    levothyroxine 25 mcg tablet    Type 2 diabetes mellitus with unspecified complications (HCC)         Relevant Medications    Dulaglutide (Trulicity) 4 27 QF/1 6YR SOPN    metFORMIN (GLUCOPHAGE) 500 mg tablet    glimepiride (AMARYL) 4 mg tablet    Other Relevant Orders    POCT hemoglobin A1c            Subjective:      Patient ID: Maru Koroma is a 80 y o  female  Patient is a 80 shima old female for follow-up evalaution after [address] discharge s/p fall with hematoma to the right side of her forehead  The following portions of the patient's history were reviewed and updated as appropriate:   Past Medical History:  She has a past medical history of Arthritis, Diabetes ([address] ), Glaucoma, Headache, Heartburn, Hypercholesteremia, Hypertension, Hypothyroidism, Migraines, Night sweats, and Weight gain  ,  _______________________________________________________________________  Medical Problems:  does not have any pertinent problems on file ,  _______________________________________________________________________  Past Surgical History:   has a past surgical history that includes [place] tooth extraction; Tubal ligation; Cholecystectomy; and Tumor removal (Left)  ,  _______________________________________________________________________  Family History:  family history includes Allergies in her daughter; Diabetes in her daughter; No Known Problems in her daughter, daughter, daughter, sister, sister, and son; Other in her brother, brother, and mother; Rheumatic fever in her father ,  _______________________________________________________________________  Social History:   reports that she has never smoked  She has never used smokeless tobacco  She reports previous alcohol use  She reports that she does not use drugs  ,  _______________________________________________________________________  Allergies:  has No Known Allergies     _______________________________________________________________________  Current Outpatient Medications   Medication Sig Dispense Refill    acetaminophen (TYLENOL) 325 mg tablet Take 3 tablets (975 mg total) by mouth every 8 (eight) hours  0    amLODIPine (NORVASC) 10 mg tablet Take 1 tablet (10 mg total) by mouth daily 90 tablet 3    ergocalciferol (VITAMIN D2) 50,000 units Take 1 capsule (50,000 Units total) by mouth once a week 12 capsule 2    glimepiride (AMARYL) 4 mg tablet Take 1 tablet (4 mg total) by mouth 2 (two) times a day 180 tablet 3    Lancets (freestyle) lancets Use as instructed 100 each 11    levothyroxine 25 mcg tablet Take 1 tablet (25 mcg total) by mouth daily 90 tablet 3    loperamide (IMODIUM A-D) 2 MG tablet Take 1 tablet (2 mg total) by mouth 4 (four) times a day as needed for diarrhea 30 tablet 0    melatonin 3 mg Take 1 tablet (3 mg total) by mouth daily at bedtime  0    mirtazapine (REMERON) 7 5 MG tablet Take 1 tablet (7 5 mg total) by mouth daily at bedtime x2 days then increase to 15 mg HS  0    ondansetron (ZOFRAN) 4 mg tablet Take 4 mg by mouth every 6 (six) hours as needed for nausea or vomiting      Dulaglutide (Trulicity) 9 15 EN/3 4LP SOPN Inject 0 5 mL (0 75 mg total) under the skin once a week 0 5 mL 6    metFORMIN (GLUCOPHAGE) 500 mg tablet Take 1 tablet (500 mg total) by mouth 2 (two) times a day with meals 180 tablet 3     No current facility-administered medications for this visit      _______________________________________________________________________  Review of Systems   Constitutional: Positive for activity change and fatigue  Negative for appetite change, chills, fever and unexpected weight change  Decreased activity due to recent fall  Patient currently using walker to ambulate  HENT: Negative for congestion, ear discharge, ear pain, nosebleeds, postnasal drip, rhinorrhea, sinus pressure, sinus pain, sneezing, sore throat and voice change  Eyes: Negative for pain, redness and visual disturbance  Respiratory: Negative for cough, chest tightness, shortness of breath and wheezing  Cardiovascular: Negative for chest pain and palpitations  Gastrointestinal: Negative for abdominal distention, abdominal pain, constipation, diarrhea, nausea and vomiting  Endocrine: Negative  Genitourinary: Negative for decreased urine volume, difficulty urinating, dysuria, flank pain, frequency, hematuria, pelvic pain and urgency  Musculoskeletal: Positive for arthralgias  Negative for myalgias  Left knee pain chronic in nature  Skin: Positive for wound  Hematoma to left side forehead sub post fall  Allergic/Immunologic: Negative  Neurological: Negative  Hematological: Negative  Psychiatric/Behavioral: Negative  Objective:  Vitals:    07/28/21 1507   BP: 132/78   Pulse: 74   Resp: 16   Temp: 99 2 °F (37 3 °C)   TempSrc: Tympanic   SpO2: 96%   Weight: 64 9 kg (143 lb)   Height: 4' 11" (1 499 m)     Body mass index is 28 88 kg/m²  Physical Exam  Vitals and nursing note reviewed  Constitutional:       Appearance: Normal appearance   She is well-developed and normal weight  HENT:      Head: Normocephalic and atraumatic  Right Ear: Tympanic membrane, ear canal and external ear normal  There is no impacted cerumen  Left Ear: Tympanic membrane, ear canal and external ear normal  There is no impacted cerumen  Nose: Nose normal  No congestion or rhinorrhea  Mouth/Throat:      Mouth: Mucous membranes are moist    Eyes:      Extraocular Movements: Extraocular movements intact  Conjunctiva/sclera: Conjunctivae normal       Pupils: Pupils are equal, round, and reactive to light  Comments: Bilateral eyes ecchymosis with swelling  Cardiovascular:      Rate and Rhythm: Normal rate and regular rhythm  Pulses: Normal pulses  Heart sounds: Murmur heard  Pulmonary:      Effort: Pulmonary effort is normal       Breath sounds: Normal breath sounds  Abdominal:      General: Bowel sounds are normal       Palpations: Abdomen is soft  Musculoskeletal:         General: Tenderness present  Normal range of motion  Cervical back: Normal range of motion  Comments: Left knee slight tenderness  Skin:     General: Skin is warm  Capillary Refill: Capillary refill takes less than 2 seconds  Comments: Left frontal region of forehead 7cm x 5cm hematoma  Height 1 5cm from surfcae of the skin  Neurological:      General: No focal deficit present  Mental Status: She is alert and oriented to person, place, and time  Cranial Nerves: No cranial nerve deficit  Sensory: No sensory deficit  Motor: Weakness present  Coordination: Coordination normal       Gait: Gait abnormal       Deep Tendon Reflexes: Reflexes normal       Comments: generalized weakness, patient has staggering gait with mild lower extremity weakness     Psychiatric:         Mood and Affect: Mood normal          Behavior: Behavior normal

## 2021-07-28 NOTE — ASSESSMENT & PLAN NOTE
Patient currently maintained on Eliquis 5 mg p o  B i d  Her daughter indicates that she restarted on the medication after discharge from the hospital 07/20/2021  - resolved  - Hypoxic to 90% on ambulation

## 2021-07-29 ENCOUNTER — HOSPITAL ENCOUNTER (OUTPATIENT)
Dept: CT IMAGING | Facility: HOSPITAL | Age: 86
Discharge: HOME | End: 2021-07-29
Payer: MEDICARE

## 2021-07-29 DIAGNOSIS — S06.5X9A SDH (SUBDURAL HEMATOMA) (HCC): ICD-10-CM

## 2021-07-29 PROCEDURE — G1004 CDSM NDSC: HCPCS

## 2021-07-29 PROCEDURE — 70450 CT HEAD/BRAIN W/O DYE: CPT

## 2021-08-02 ENCOUNTER — OFFICE VISIT (OUTPATIENT)
Dept: NEUROSURGERY | Facility: CLINIC | Age: 86
End: 2021-08-02
Payer: MEDICARE

## 2021-08-02 VITALS
TEMPERATURE: 97.8 F | HEIGHT: 59 IN | SYSTOLIC BLOOD PRESSURE: 159 MMHG | HEART RATE: 74 BPM | WEIGHT: 143 LBS | DIASTOLIC BLOOD PRESSURE: 91 MMHG | BODY MASS INDEX: 28.83 KG/M2

## 2021-08-02 DIAGNOSIS — S06.5X9A SDH (SUBDURAL HEMATOMA) (HCC): Primary | ICD-10-CM

## 2021-08-02 PROCEDURE — 99213 OFFICE O/P EST LOW 20 MIN: CPT | Performed by: PHYSICIAN ASSISTANT

## 2021-08-02 NOTE — PROGRESS NOTES
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  Patient came with her daughter  Denies any headache, nausea, vomiting, weakness or visual issues  L left forehead catheter hematoma persists but slightly decreased compared to  2 weeks ago  Patient did physical therapy  Has some gait issues  and uses walker for ambulation  Exam-Alert, communicates well, but slow to react and has difficulty following command  Strength 5/5 and sensation intact bilaterally  DTR 2+ wo clonus bilaterally  Soft left forehead cephalhematoma noted, slightly decreasing in size  Hx, PEx, and images reviewed with patient and her daughter  Management plan discussed  Patient is subdural hematoma already resolved  From Neurosurgery perspective, patient does not need any regular follow-up or further imaging  Discussed with her daughter that patient can consult tear cardiologist regarding restarting her Eliquis-to re-evaluate the status of her AFib and stroke risk weigh the risks and benefits of putting back on Eliquis in the setting of falls incidents  If possible to consider  switching to baby aspirin  Fall precaution  Questions and concerns were answered  Both patient and daughter understand the instructions and agreed with the plan  Plan:  1  CT head without contrast shows no residual subdural hematoma  2  The from Neurosurgery perspective no procedure or regular follow-up or images required  3  Patient cleared from NSx standpoint  4  Advised her daughter to discuss with her cardiologist regarding putting back on Eliquis, after weighing the risks and benefits or considering switching to baby aspirin  5  Follow-up on p r n  basis for incidental left CP meningioma  6  Call with question or concern  C/C: " [facility] follow up /1 month"    HPI     Patient is a 80years old pleasant lady here today to go over the results of her brain CT without contrast   History of fall on Eliquis about a month ago with injury to her head  Had 1 month follow-up CT head today and it shows complete resolution of parafalcine and tentorial subdural hematoma, stable left cerebellopontine angle meningioma  No new hemorrhages  Her left forehead cephalohematoma slightly decreased and it is painless  Patient reports no headache, nausea, vomiting, blurry vision or diplopia  Denies swallowing or speech issues  Denies bowel/bladder dysfunction  Denies weakness she has baseline gait instability and uses walker for ambulation  Completed physical therapy    The following portions of the patient's history were reviewed and updated as appropriate: allergies, current medications, past family history, past medical history, past social history, past surgical history and problem list   Review of system personally reviewed and updated  Review of Systems   Constitutional: Negative  HENT: Negative  Eyes: Negative  Respiratory: Negative  Cardiovascular: Negative  Gastrointestinal: Negative  Endocrine: Negative  Genitourinary: Negative  Musculoskeletal: Negative  Skin: Negative  Allergic/Immunologic: Negative  Neurological: Negative  Hematological: Negative  Psychiatric/Behavioral: Positive for confusion  All other systems reviewed and are negative  Objective:    Physical Exam  Constitutional:       Appearance: Normal appearance  HENT:      Head: Atraumatic  Comments: Left forehead cephalohematoma  Eyes:      Extraocular Movements: Extraocular movements intact  Cardiovascular:      Rate and Rhythm: Normal rate  Pulses: Normal pulses     Pulmonary:      Effort: Pulmonary effort is normal    Musculoskeletal: General: Normal range of motion  Cervical back: Normal range of motion  Neurological:      Mental Status: She is alert  Mental status is at baseline  GCS: GCS eye subscore is 4  GCS verbal subscore is 4  GCS motor subscore is 6  Cranial Nerves: Cranial nerves are intact  Sensory: No sensory deficit  Motor: No weakness  Coordination: Finger-Nose-Finger Test normal       Gait: Gait abnormal       Deep Tendon Reflexes: Reflexes are normal and symmetric  Reflex Scores:       Tricep reflexes are 2+ on the right side and 2+ on the left side  Bicep reflexes are 2+ on the right side and 2+ on the left side  Brachioradialis reflexes are 2+ on the right side and 2+ on the left side  Patellar reflexes are 2+ on the right side and 2+ on the left side  Achilles reflexes are 2+ on the right side and 2+ on the left side  Comments: Gait instability noted   Psychiatric:         Speech: Speech normal          Neurologic Exam     Mental Status   Speech: speech is normal   Level of consciousness: alert    Cranial Nerves     CN III, IV, VI   Right pupil: Size: 2 mm  Shape: regular  Left pupil: Size: 2 mm  Shape: regular       Motor Exam   Muscle bulk: normal  Overall muscle tone: normal  Right arm tone: normal  Left arm tone: normal  Right arm pronator drift: absent  Left arm pronator drift: absent  Right leg tone: normal  Left leg tone: normal    Sensory Exam   Light touch normal      Gait, Coordination, and Reflexes     Coordination   Finger to nose coordination: normal    Reflexes   Right brachioradialis: 2+  Left brachioradialis: 2+  Right biceps: 2+  Left biceps: 2+  Right triceps: 2+  Left triceps: 2+  Right patellar: 2+  Left patellar: 2+  Right achilles: 2+  Left achilles: 2+  Right : 2+  Left : 2+  Right Garcia: absent  Left Garcia: absent  Right ankle clonus: absent  Left pendular knee jerk: absent

## 2021-08-11 ENCOUNTER — TELEPHONE (OUTPATIENT)
Dept: FAMILY MEDICINE CLINIC | Facility: CLINIC | Age: 86
End: 2021-08-11

## 2021-08-11 NOTE — TELEPHONE ENCOUNTER
Pt's daughter called - all medications from 7/28 are not showing up available at [facility] on [address]     Can these please be called in? Also, pt's wheelchair order is not processed?

## 2021-08-11 NOTE — TELEPHONE ENCOUNTER
The wheelchair order was printed out and faxed to the [employer] company that she needed to get it from phone not mistaken  Please speak with Mary Beth Mercado in reference to current status of order

## 2021-08-12 NOTE — TELEPHONE ENCOUNTER
Her medications were sent to the pharmacy the same day of her appointment (there is receipt to show the pharmacy received it), also her order was sent to [employer] so they may have to call to see what is going on with process? I do not get any notification once it is sent  I am sending an email to the company since they do not have a number to call  I will let you know once I receive an answer

## 2021-08-23 ENCOUNTER — TELEPHONE (OUTPATIENT)
Dept: CARDIOLOGY CLINIC | Facility: CLINIC | Age: 86
End: 2021-08-23

## 2021-08-23 DIAGNOSIS — I48.0 PAROXYSMAL ATRIAL FIBRILLATION (HCC): Primary | ICD-10-CM

## 2021-08-23 NOTE — TELEPHONE ENCOUNTER
Patient's daughter called for a refill of eliquis  I reviewed her chart and eliquis was discontinued by neurology after a fall with head injury and subdural hematoma on 6/27/21  She had a CT one month post fall and was told to contact us regarding re-starting eliquis and weigh out the risk vs benefits  Her daughter re-started eliquis 1 month ago and has been giving her 5mg only once daily on her own  She feared her mother would have a stroke without any blood thinner  Please advise Dr Milagros Muñoz is off this week   Thanks

## 2021-08-25 ENCOUNTER — OFFICE VISIT (OUTPATIENT)
Dept: FAMILY MEDICINE CLINIC | Facility: CLINIC | Age: 86
End: 2021-08-25
Payer: MEDICARE

## 2021-08-25 VITALS
DIASTOLIC BLOOD PRESSURE: 78 MMHG | WEIGHT: 143 LBS | SYSTOLIC BLOOD PRESSURE: 150 MMHG | HEART RATE: 68 BPM | TEMPERATURE: 97.9 F | RESPIRATION RATE: 16 BRPM | BODY MASS INDEX: 28.83 KG/M2 | HEIGHT: 59 IN

## 2021-08-25 DIAGNOSIS — I10 ESSENTIAL HYPERTENSION: ICD-10-CM

## 2021-08-25 DIAGNOSIS — I48.0 PAROXYSMAL ATRIAL FIBRILLATION (HCC): ICD-10-CM

## 2021-08-25 DIAGNOSIS — H61.23 EXCESSIVE CERUMEN IN EAR CANAL, BILATERAL: ICD-10-CM

## 2021-08-25 DIAGNOSIS — Z86.79 HISTORY OF ATRIAL FIBRILLATION: ICD-10-CM

## 2021-08-25 DIAGNOSIS — R35.0 URINARY FREQUENCY: ICD-10-CM

## 2021-08-25 DIAGNOSIS — S06.5X9A SUBDURAL HEMATOMA (HCC): ICD-10-CM

## 2021-08-25 DIAGNOSIS — R19.7 DIARRHEA, UNSPECIFIED TYPE: ICD-10-CM

## 2021-08-25 DIAGNOSIS — E11.65 UNCONTROLLED TYPE 2 DIABETES MELLITUS WITH HYPERGLYCEMIA (HCC): Primary | ICD-10-CM

## 2021-08-25 DIAGNOSIS — Z87.440 HISTORY OF RECURRENT UTI (URINARY TRACT INFECTION): ICD-10-CM

## 2021-08-25 LAB — SL AMB POCT GLUCOSE BLD: 277

## 2021-08-25 PROCEDURE — 82948 REAGENT STRIP/BLOOD GLUCOSE: CPT | Performed by: NURSE PRACTITIONER

## 2021-08-25 PROCEDURE — 99214 OFFICE O/P EST MOD 30 MIN: CPT | Performed by: NURSE PRACTITIONER

## 2021-08-25 NOTE — ASSESSMENT & PLAN NOTE
Subdural hematoma to the left side forehead as well as the bridge of her nose has decreased in swelling by 50%  Minimal pain and discomfort  No neurologic symptoms noted

## 2021-08-25 NOTE — ASSESSMENT & PLAN NOTE
Bilateral ears irrigated with half peroxide half normal saline  Minimal cerumen noted from ear canals  Patient recently cleaned her ears

## 2021-08-25 NOTE — ASSESSMENT & PLAN NOTE
Patient has no complaints of urinary frequency at this time  When she drinks lots of fluids she does void routinely and increased pace

## 2021-08-25 NOTE — PROGRESS NOTES
BMI Counseling: Body mass index is 28 88 kg/m²  The BMI is above normal  Nutrition recommendations include decreasing portion sizes, encouraging healthy choices of fruits and vegetables, decreasing fast food intake, consuming healthier snacks, limiting drinks that contain sugar, moderation in carbohydrate intake, increasing intake of lean protein, reducing intake of saturated and trans fat and reducing intake of cholesterol  Exercise recommendations include vigorous physical activity 75 minutes/week, exercising 3-5 times per week, obtaining a gym membership and strength training exercises  No pharmacotherapy was ordered  Depression Screening and Follow-up Plan: Patient's depression screening was positive with a PHQ-2 score of 2  Their PHQ-9 score was 7  Clincally patient does not have depression  No treatment is required  Falls Plan of Care: balance, strength, and gait training instructions were provided  Medications that increase falls were reviewed  Vitamin D supplementation was recommended  Home safety education provided  Assessment/Plan:         Problem List Items Addressed This Visit        Endocrine    DM (diabetes mellitus), type 2, uncontrolled ([address] ) - Primary     Patient's blood sugar 277 nonfasting  Patient currently taking Amaryl 4 mg 2 times a day  Her daughter reports she has not been taking the Trulicity  She has not been taking the metformin as well due to abdominal cramping  Patient noncompliant with therapy  Instructed that abdominal cramping and diarrhea will resolve after some time  We Trulicity stop  Lab Results   Component Value Date    HGBA1C 7 8 (A) 07/28/2021            Relevant Orders    POCT blood glucose (Completed)    Microalbumin, Random Urine (W/Creatinine)    Urine culture       Cardiovascular and Mediastinum    Essential hypertension    Paroxysmal atrial fibrillation [facility])     Patient to see Cardiology and set up an appointment    Currently not on anticoagulation as per Cardiology  Patient's daughter will follow-up with them  Nervous and Auditory    Excessive cerumen in ear canal, bilateral     Bilateral ears irrigated with half peroxide half normal saline  Minimal cerumen noted from ear canals  Patient recently cleaned her ears  Subdural hematoma (HCC)     Subdural hematoma to the left side forehead as well as the bridge of her nose has decreased in swelling by 50%  Minimal pain and discomfort  No neurologic symptoms noted  Other    History of recurrent UTI (urinary tract infection)     Patient unable to void at this time  Urine ordered for culture and microscopy  Prior history of ketone urea as well as protein urea  Patient also had UTI in the past          History of atrial fibrillation     Patient has history of atrial fibrillation  To follow-up with cardiology for further evaluation  Currently not on anticoagulation therapy  Urinary frequency     Patient has no complaints of urinary frequency at this time  When she drinks lots of fluids she does void routinely and increased pace  Relevant Orders    Urine culture    Diarrhea            Subjective:      Patient ID: Kinza Jordan is a 80 y o  female  Patient is a 80year old female present for evaluation of her subdural hematoma to the left forehead, hyperglycemia with medication management, evaluation of hypertension  The following portions of the patient's history were reviewed and updated as appropriate:   Past Medical History:  She has a past medical history of Arthritis, Diabetes ([address] ), Glaucoma, Headache, Heartburn, Hypercholesteremia, Hypertension, Hypothyroidism, Migraines, Night sweats, and Weight gain  ,  _______________________________________________________________________  Medical Problems:  does not have any pertinent problems on file ,  _______________________________________________________________________  Past Surgical History:   has a past surgical history that includes [place] tooth extraction; Tubal ligation; Cholecystectomy; and Tumor removal (Left)  ,  _______________________________________________________________________  Family History:  family history includes Allergies in her daughter; Diabetes in her daughter; No Known Problems in her daughter, daughter, daughter, sister, sister, and son; Other in her brother, brother, and mother; Rheumatic fever in her father ,  _______________________________________________________________________  Social History:   reports that she has never smoked  She has never used smokeless tobacco  She reports previous alcohol use  She reports that she does not use drugs  ,  _______________________________________________________________________  Allergies:  has No Known Allergies     _______________________________________________________________________  Current Outpatient Medications   Medication Sig Dispense Refill    acetaminophen (TYLENOL) 325 mg tablet Take 3 tablets (975 mg total) by mouth every 8 (eight) hours  0    amLODIPine (NORVASC) 10 mg tablet Take 1 tablet (10 mg total) by mouth daily 90 tablet 3    apixaban (ELIQUIS) 5 mg Take 5 mg by mouth 2 (two) times a day      ergocalciferol (VITAMIN D2) 50,000 units Take 1 capsule (50,000 Units total) by mouth once a week 12 capsule 2    Lancets (freestyle) lancets Use as instructed 100 each 11    levothyroxine 25 mcg tablet Take 1 tablet (25 mcg total) by mouth daily 90 tablet 3    loperamide (IMODIUM A-D) 2 MG tablet Take 1 tablet (2 mg total) by mouth 4 (four) times a day as needed for diarrhea 30 tablet 0    metFORMIN (GLUCOPHAGE) 500 mg tablet Take 1 tablet (500 mg total) by mouth 2 (two) times a day with meals 180 tablet 3    mirtazapine (REMERON) 7 5 MG tablet Take 1 tablet (7 5 mg total) by mouth daily at bedtime x2 days then increase to 15 mg HS  0    ondansetron (ZOFRAN) 4 mg tablet Take 4 mg by mouth every 6 (six) hours as needed for nausea or vomiting      glimepiride (AMARYL) 4 mg tablet Take 1 tablet (4 mg total) by mouth 2 (two) times a day (Patient not taking: Reported on 8/25/2021) 180 tablet 3    melatonin 3 mg Take 1 tablet (3 mg total) by mouth daily at bedtime (Patient not taking: Reported on 8/2/2021)  0     No current facility-administered medications for this visit      _______________________________________________________________________  Review of Systems   Constitutional: Negative for activity change, appetite change, chills, fatigue, fever and unexpected weight change  HENT: Negative for congestion, dental problem, drooling, ear discharge, ear pain, mouth sores, nosebleeds, postnasal drip, rhinorrhea, sinus pressure, sinus pain, sneezing, sore throat and voice change  Eyes: Negative for pain, redness and visual disturbance  Respiratory: Negative for cough, chest tightness, shortness of breath and wheezing  Cardiovascular: Negative for chest pain and palpitations  Gastrointestinal: Negative for abdominal pain, constipation, diarrhea, nausea and vomiting  Endocrine: Negative  Genitourinary: Negative for difficulty urinating, dysuria, flank pain, frequency, hematuria and urgency  Musculoskeletal: Negative for arthralgias and neck pain  Skin: Negative  Allergic/Immunologic: Negative  Neurological: Negative  Hematological: Negative  Psychiatric/Behavioral: Negative  Objective:  Vitals:    08/25/21 1458   BP: 150/78   Pulse: 68   Resp: 16   Temp: 97 9 °F (36 6 °C)   TempSrc: Tympanic   Weight: 64 9 kg (143 lb)   Height: 4' 11" (1 499 m)     Body mass index is 28 88 kg/m²  Physical Exam  Vitals and nursing note reviewed  Constitutional:       Appearance: Normal appearance  She is well-developed and normal weight  HENT:      Head: Normocephalic and atraumatic        Right Ear: Tympanic membrane, ear canal and external ear normal       Left Ear: Tympanic membrane, ear canal and external ear normal       Nose: Nose normal       Mouth/Throat:      Mouth: Mucous membranes are moist    Eyes:      Extraocular Movements: Extraocular movements intact  Conjunctiva/sclera: Conjunctivae normal       Pupils: Pupils are equal, round, and reactive to light  Cardiovascular:      Rate and Rhythm: Normal rate and regular rhythm  Pulses: Normal pulses  Heart sounds: Normal heart sounds  No murmur heard  Pulmonary:      Effort: Pulmonary effort is normal       Breath sounds: Normal breath sounds  Abdominal:      General: Bowel sounds are normal       Palpations: Abdomen is soft  Musculoskeletal:         General: Normal range of motion  Cervical back: Normal range of motion and neck supple  Skin:     General: Skin is warm  Capillary Refill: Capillary refill takes less than 2 seconds  Neurological:      General: No focal deficit present  Mental Status: She is alert and oriented to person, place, and time     Psychiatric:         Mood and Affect: Mood normal          Behavior: Behavior normal

## 2021-08-25 NOTE — ASSESSMENT & PLAN NOTE
Patient unable to void at this time  Urine ordered for culture and microscopy  Prior history of ketone urea as well as protein urea    Patient also had UTI in the past

## 2021-08-25 NOTE — ASSESSMENT & PLAN NOTE
Patient has history of atrial fibrillation  To follow-up with cardiology for further evaluation  Currently not on anticoagulation therapy

## 2021-08-25 NOTE — ASSESSMENT & PLAN NOTE
Patient to see Cardiology and set up an appointment  Currently not on anticoagulation as per Cardiology  Patient's daughter will follow-up with them

## 2021-08-25 NOTE — PATIENT INSTRUCTIONS
Diabetes and Nutrition   WHAT YOU NEED TO KNOW:   Why are nutrition plans important? Nutrition plans help with healthy eating patterns that improve health  Nutrition plans and regular exercise help keep your blood sugar levels steady  They also help delay or prevent complications of diabetes, such as diabetic kidney disease  How do I create a nutrition plan? A dietitian will help you create a nutrition plan to meet your needs and your family's needs  The goal is for you to reach or maintain healthy weight, blood sugar, blood pressure, and lipid levels  You should meet with the dietitian at least 1 time each year  You will learn the following:  · How food affects your blood sugar levels    · How to create healthy eating habits    · How to make food choices based on your activity level, weight, and glucose levels    · How your favorite foods may fit into your plan    · Foods that contain carbohydrates (sugars and starches), including simple and complex carbohydrates    · How to keep track of all carbohydrates    · Correct portion sizes for each food    · Changes you can make to your plan if you get pregnant or are breastfeeding    What are some tips to do until I meet with the dietitian? · Do not skip meals  The goal is to keep your blood sugar level steady  Blood sugar levels may drop too low if you have received insulin and do not eat  · Eat more high-fiber foods, such as fresh or frozen fruits and vegetables, whole-grain breads, and beans  Fiber helps control or lower blood sugar and cholesterol levels  Choose whole fruits instead of fruit juice as much as possible  Sugar may be added to juice, and fiber may be removed  · Choose heart-healthy fats  Foods high in heart-healthy fats include olive oil, nuts, avocados, and fatty fish, such as salmon and tuna  Foods high in unhealthy fats include red meat, full-fat dairy products, and soft margarine   Unhealthy fats can increase your risk for heart disease, increase bad cholesterol, and lower good cholesterol  · Choose complex carbohydrates  Foods with complex carbohydrates include brown rice, whole-grain breads and cereals, and cooked beans  Foods with simple carbohydrates include white bread, white rice, most cold cereals, and snack foods  Your plan will include the amount of carbohydrate to have at one time or in a day  Your blood sugar level can get too high if you eat too much carbohydrate at one time  Blood sugar levels do not spike as high or drop as quickly with complex carbohydrates as with simple carbohydrates  Choose complex carbohydrates whenever possible  · Have less sodium (salt)  The risk for high blood pressure (BP) increases with high-sodium foods  Limit high-sodium foods, such as soy sauce, potato chips, and canned soup  Do not add salt to food you cook  Limit your use of table salt  Read labels to have no more than 2,300 milligrams of sodium in one day  · Limit artificial sweeteners  These may be found in food or drinks, such as diet soft drinks or other low-calorie beverages  Artificial sweeteners are low in calories  They may help you lower your overall calories and carbohydrates  It is important not to have more calories from other foods to make up for the calories saved  Artificial sweeteners do not have any nutrition  Eat whole foods and drink water as much as possible  Your plan may include beverages with artificial sweeteners for a short time  These can help you transition from high-sugar beverages to water  · Use the plate method for each meal   This method can help you eat the right amount of carbohydrates and keep your blood sugar levels under control  ? Draw an imaginary line down the middle of a 9-inch dinner plate  On one side, draw another line to divide that section in half  Your plate will have one large section and 2 small sections  ? Fill the largest section with non-starchy vegetables   These include broccoli, spinach, cucumbers, peppers, cauliflower, and tomatoes  ? Add a starch to one of the small sections  Starches include pasta, rice, whole-grain bread, tortillas, corn, potatoes, and beans  ? Add meat or another source of protein to the other small section  Examples include chicken or turkey without skin, fish, lean beef or pork, low-fat cheese, tofu, and eggs  ? Add dairy products or fruit next to your plate if your meal plan allows  Examples of dairy include skim or 1% milk and low-fat yogurt  If you do not drink milk or eat dairy products, you may be able to add another serving of starchy food instead  ? Have a low-calorie or calorie-free drink with your meal  Examples include water or unsweetened tea or coffee  What are the risks of alcohol? Alcohol can cause hypoglycemia (very low blood sugar level), especially if you use insulin  Alcohol can cause high blood sugar and BP levels, and weight gain if you drink too much  Women 21 years or older and men 72 years or older should limit alcohol to 1 drink a day  Men aged 24 to 59 years should limit alcohol to 2 drinks a day  A drink of alcohol is 12 ounces of beer, 5 ounces of wine, or 1½ ounces of liquor  Hypoglycemia can happen hours after you drink alcohol  Check your blood sugar level for several hours after you drink alcohol  Have a source of fast-acting carbohydrates with you in case your level goes too low  You need immediate care if you have signs or symptoms of hypoglycemia, such as sweating, confusion, or fainting  Why is it important to maintain a healthy weight? A healthy weight can help you control your diabetes  You can maintain a healthy weight with a nutrition plan and exercise  Ask your healthcare provider how much you should weigh  Ask him or her to help you create a weight loss plan if you are overweight  Together you can set weight loss and maintenance goals    Call your local emergency number (911 in the [address]) if: · You have any of the following signs of a heart attack:      ? Squeezing, pressure, or pain in your chest    ? You may  also have any of the following:     § Discomfort or pain in your back, neck, jaw, stomach, or arm    § Shortness of breath    § Nausea or vomiting    § Lightheadedness or a sudden cold sweat      When should I seek immediate care? · You have a low blood sugar level and it does not improve with treatment  Symptoms are trouble thinking, a pounding heartbeat, and sweating  · Your blood sugar level is above 240 mg/dL and does not come down within 15 minutes of treatment  · You have ketones in your blood or urine  · You have nausea or are vomiting and cannot keep any food or liquid down  · You have blurred or double vision  · Your breath has a fruity, sweet smell, or your breathing is shallow  When should I call my doctor or diabetes care team?   · Your blood sugar levels are higher than your target goals  · You often have low blood sugar levels  · You have trouble coping with diabetes, or you feel anxious or depressed  · You have questions or concerns about your condition or care  CARE AGREEMENT:   You have the right to help plan your care  Learn about your health condition and how it may be treated  Discuss treatment options with your healthcare providers to decide what care you want to receive  You always have the right to refuse treatment  The above information is an  only  It is not intended as medical advice for individual conditions or treatments  Talk to your doctor, nurse or pharmacist before following any medical regimen to see if it is safe and effective for you  © Copyright [employer] 2021 Information is for End User's use only and may not be sold, redistributed or otherwise used for commercial purposes   All illustrations and images included in CareNotes® are the copyrighted property of A [employer] A [employer]  or [address] [place] [address] and Exercise   WHAT YOU NEED TO KNOW:   How will exercise help me manage diabetes? Physical activity, such as exercise, can help keep your blood sugar level steady or improve insulin resistance  Activity can help decrease your risk for heart disease, and help you lose weight  Exercise can also help lower your A1c  Your diabetes care team will help you create an exercise plan  The plan will be based on the type of diabetes you have and your starting fitness level  What are some tips to help me create and meet my exercise goals? · Set a goal for 150 minutes (2 5 hours) of moderate to vigorous aerobic activity each week  Aerobic activity helps your heart stay strong  Aerobic activity includes walking, bicycling, dancing, swimming, and raking leaves  Spread aerobic activity over 3 to 5 days  Do not take more than 2 days off in a row  It is best to do at least 10 minutes at a time and 30 minutes each day  You can work up to these goals  Remember that any activity is better than no activity  Over time, you can make exercise more intense or last longer  You can also add more days of exercise as your fitness level improves  Your diabetes care team can help you make a step-by-step plan to achieve your goals  · Set a strength training goal of 2 to 3 times a week  Take at least 1 day off in between strength training sessions  Strength training helps you keep the muscles you have and build new muscles  Strength training includes lifting weights, climbing stairs, yoga, and north chi          · Older adults should include balance training 2 to 3 times each week  These include walking backwards, standing on one foot, and walking heel to toe in a straight line  What are some other healthy exercise tips? · Stretch before and after you exercise to prevent injury  · Drink water or liquids that do not contain sugar before, during, and after exercise   Ask your dietitian or healthcare provider which liquids you should drink when you exercise  · Do not sit for longer than 30 minutes at a time during your day  If you cannot walk around, at least stand up  This will help you stay active and keep your blood circulating  What do I need to know about exercise and my blood sugar levels? Check your blood sugar level before and after exercise, if you use insulin  Healthcare providers may tell you to change the amount of insulin you take or food you eat  · If your blood sugar level is high, check your blood or urine for ketones before you exercise  Do not exercise if your blood sugar level is high and you have ketones  · If your blood sugar level is less than 100 mg/dL, have a carbohydrate snack before you exercise  Examples are 4 to 6 crackers, ½ banana, 8 ounces (1 cup) of milk, or 4 ounces (½ cup) of juice  Call your local emergency number (911 in the [facility]) if:   · You have chest pain or shortness of breath  When should I seek immediate care? · You have a low blood sugar level and it does not improve with treatment  Symptoms are trouble thinking, a pounding heartbeat, and sweating  · Your blood sugar level is above 240 mg/dL and does not come down within 15 minutes of treatment  · You have blurred or double vision  · Your breath has a fruity, sweet smell, or your breathing is shallow  When should I call my doctor or diabetes care team?   · You have ketones in your blood or urine  · You have a fever  · Your blood sugar levels are higher than your target goals  · You often have low blood sugar levels  · Your skin is red, dry, warm, or swollen  · You have a wound that does not heal     · You have trouble coping with diabetes, or you feel anxious or depressed  · You have questions or concerns about your condition or care  CARE AGREEMENT:   You have the right to help plan your care  Learn about your health condition and how it may be treated   Discuss treatment options with your healthcare providers to decide what care you want to receive  You always have the right to refuse treatment  The above information is an  only  It is not intended as medical advice for individual conditions or treatments  Talk to your doctor, nurse or pharmacist before following any medical regimen to see if it is safe and effective for you  © Copyright [employer] 2021 Information is for End User's use only and may not be sold, redistributed or otherwise used for commercial purposes  All illustrations and images included in CareNotes® are the copyrighted property of A D A [employer]  or Chivo Foster     Diabetic Foot Ulcers   WHAT YOU NEED TO KNOW:   What is a diabetic foot ulcer? A diabetic foot ulcer can be redness over a bony area or an open sore  The ulcer can develop anywhere on your foot or toes  Ulcers usually develop on the bottom of the foot  You may not know you have an ulcer until you notice drainage on your sock  Drainage is fluid that may be yellow, brown, or red  The fluid may also contain pus or blood  What increases my risk for a diabetic foot ulcer? · Blood sugar levels that are not controlled    · Nerve damage and numbness in your feet    · Poor blood flow     · A foot deformity, such as a bunion or hammertoe    · Calluses or corns on your feet or toes    · A decrease in vision that keeps you from seeing your feet clearly    · Being overweight    · Cigarette smoking or alcohol use    How is a diabetic foot ulcer diagnosed and treated? Your healthcare provider will ask about your symptoms and examine your foot and the ulcer  He or she may check your shoes  He or she may also send you to a podiatrist (foot doctor) for treatment  The goal of treatment is to start healing your foot ulcer as soon as possible  The risk for infection decreases with faster healing  Do the following to help your ulcer heal:  · Prevent or treat an infection    A bandage will be put on your ulcer  Your healthcare provider will give you instructions on changing your bandage  You may need to clean the wound and change the bandage daily  The bandage may contain medicines to help your ulcer heal  You may be asked to put medicine on your foot ulcer before you put on the bandage  The medicine may also prevent growth of tissue that is not healthy  If you have an infection, your healthcare provider will give you antibiotics to treat it  · Have any dead tissue debrided (removed)  The removal of dead skin and tissue around your foot ulcer can help with healing  · Manage your blood sugar levels and other health problems  Your blood sugar, blood pressure, and cholesterol levels need to be controlled to help your foot ulcer heal  Your healthcare provider can help you make a plan to manage your health problems  · Offload (take the pressure off) the foot ulcer  You may need special shoes with insoles, cushions, or braces  You may be asked to use a wheelchair or crutches until your foot ulcer heals  These items will help keep pressure and irritation off the area of your foot ulcer  Your foot ulcer can heal faster without pressure and irritation  · Have blood flow to your foot increased  Your healthcare provider may use hyperbaric oxygen therapy or negative pressure wound therapy to increase blood flow  Ask for more information about these therapies  · Go to specialists as directed  Your healthcare provider may recommend you see a podiatrist, or an orthopedic or vascular surgeon  These healthcare providers can help manage your treatment  What can I do to prevent diabetic foot ulcers? Good foot care may help prevent ulcers, or keep them from getting worse  Ask someone to help you if you are not able to check or care for your feet  The following can help you prevent diabetic foot ulcers:  · Keep your blood sugar levels under control    Continue the plan for your diabetes that you and your healthcare provider have discussed  Healthy food choices and taking your medicines as directed may help control blood sugars  Contact your healthcare provider if your blood sugar levels are higher than directed  · Wash your feet each day with soap and warm water  Do not use hot water, because this can injure your foot  Dry your feet gently with a towel after you wash them  Dry between and under your toes  · Apply lotion or a moisturizer on your dry feet  Ask your healthcare provider what lotions are best to use  Do not put lotion or moisturizer between your toes  Moisture between your toes could lead to skin breakdown  · Check your feet each day  Look at your whole foot, including the bottom, and between and under your toes  Check for wounds, corns, and calluses  Feel your feet by running your hands along the tops, bottoms, sides, and between your toes  Use a nonbreakable mirror to check your feet if you have trouble seeing the bottoms  Do not try to remove corns or calluses yourself  File or cut your toenails straight across  · Protect your feet  Do not walk barefoot or wear your shoes without socks  Check your shoes for rocks or other objects that can hurt your feet  Wear cotton socks to help keep your feet dry  Wear socks without toe seams, or wear them with the seams inside out  Change your socks each day  Do not wear socks that are dirty or damp  · Wear shoes that fit well  Wear shoes that do not rub against any area of your feet  Your shoes should be ½ to ¾ inch (1 to 2 centimeters) longer than your feet  Your shoes should also have extra space around the widest part of your feet  Walking or athletic shoes with laces or straps that adjust are best  Ask your healthcare provider for help to choose the right shoes for you  Ask him or her if you need to wear an insert, orthotic, or bandage on your feet  · Do not smoke    Nicotine can damage your blood vessels and increase your risk for foot ulcers  Do not use e-cigarettes or smokeless tobacco in place of cigarettes or to help you quit  They still contain nicotine  Ask your healthcare provider for information if you currently smoke and need help quitting  · Know the risks if you choose to drink alcohol  Alcohol can cause your blood sugar levels to be low if you use insulin  Alcohol can cause high blood sugar levels and weight gain if you drink too much  A drink of alcohol is 12 ounces of beer, 5 ounces of wine, or 1½ ounces of liquor  · Maintain a healthy weight  Ask your healthcare provider how much you should weigh  A healthy weight can help you control your diabetes  Ask him or her to help you create a weight loss plan if you are overweight  Even a 10 to 15 pound weight loss can help you better manage your blood sugar level  Call your local emergency number (911 in the [address] [facility] [address] [facility]) if:   · You have a fever with chills  · You begin vomiting  · You feel faint or become confused  When should I call my doctor? · You see new drainage on your sock  · Your foot becomes red, warm, and swollen  · Your foot ulcer has a bad smell or is draining pus  · You feel pain in a foot that used to have little or no feeling  · You see black or dead tissue in or around your ulcer  · Your ulcer becomes bigger, deeper, or does not heal      · You have questions or concerns about your condition or care  CARE AGREEMENT:   You have the right to help plan your care  Learn about your health condition and how it may be treated  Discuss treatment options with your healthcare providers to decide what care you want to receive  You always have the right to refuse treatment  The above information is an  only  It is not intended as medical advice for individual conditions or treatments  Talk to your doctor, nurse or pharmacist before following any medical regimen to see if it is safe and effective for you    © Copyright [employer] 2021 Information is for [employer] use only and may not be sold, redistributed or otherwise used for commercial purposes  All illustrations and images included in CareNotes® are the copyrighted property of A D A [employer]  or [employer]    Hypertension   WHAT YOU NEED TO KNOW:   What is hypertension? Hypertension is high blood pressure  Your blood pressure is the force of your blood moving against the walls of your arteries  Hypertension causes your blood pressure to get so high that your heart has to work much harder than normal  This can damage your heart  The cause of hypertension may not be known  This is called essential or primary hypertension  Hypertension caused by another medical condition, such as kidney disease, is called secondary hypertension  What do I need to know about the stages of hypertension? ·   Normal blood pressure is 119/79 or lower   Your healthcare provider may only check your blood pressure each year if it stays at a normal level  · Elevated blood pressure is 120/79 to 129/79   This is sometimes called prehypertension  Your healthcare provider may suggest lifestyle changes to help lower your blood pressure to a normal level  He or she may then check it again in 3 to 6 months  · Stage 1 hypertension is 130/80  to 139/89   Your provider may recommend lifestyle changes, medication, and checks every 3 to 6 months until your blood pressure is controlled  · Stage 2 hypertension is 140/90 or higher   Your provider will recommend lifestyle changes and have you take 2 kinds of hypertension medicines  You will also need to have your blood pressure checked monthly until it is controlled  What increases my risk for hypertension?    · Age older than 54 years (men) or 72 (women)    · Stress, or a family history of hypertension or heart disease    · Obesity, lack of exercise, or too many high-sodium foods    · Use of tobacco, alcohol, or illegal drugs    · A medical condition, such as diabetes, kidney disease, thyroid disease, or adrenal gland disorder    · Certain medicines, such as steroids or birth control pills    What are the signs and symptoms of hypertension? You may have no signs or symptoms, or you may have any of the following:  · Headache    · Blurred vision    · Chest pain    · Dizziness or weakness    · Trouble breathing    · Nosebleeds    How is hypertension diagnosed? Your healthcare provider will take your blood pressure at several visits  You may also need to check your blood pressure at home  The provider will examine you and ask what medicines you take  He or she will also ask if you have a family history of high blood pressure and about any health conditions you have  He or she will also check your blood pressure and weight and examine your heart, lungs, and eyes  You may need any of the following tests:  · An ambulatory blood pressure monitor (ABPM)  is a device that you wear  ABPM measures your blood pressure while you do your regular daily activities  It records your blood pressure every 15 to 30 minutes during the day  It also records your blood pressure every 15 minutes to 1 hour at night  The recorded blood pressures help your healthcare provider know if you have hypertension not seen at your appointment  · Blood tests  may help healthcare providers find the cause of your hypertension  Blood tests can also help find other health problems caused by hypertension  · Urine tests  will be done to check your kidney function  Kidney problems can increase your risk for hypertension  Which medicines are used to treat hypertension? · Antihypertensives  may be used to help lower your blood pressure  Several kinds of medicines are available  Your healthcare provider will choose medicines based on the kind of hypertension you have  You may need more than one type of medicine  Take the medicine exactly as directed      · Diuretics  help decrease extra fluid that collects in your body  This will help lower your blood pressure  You may urinate more often while you take this medicine  · Cholesterol medicine  helps lower your cholesterol level  A low cholesterol level helps prevent heart disease and makes it easier to control your blood pressure  What can I do to manage hypertension? · Check your blood pressure at home  Avoid smoking, caffeine, and exercise at least 30 minutes before checking your blood pressure  Sit and rest for 5 minutes before you take your blood pressure  Extend your arm and support it on a flat surface  Your arm should be at the same level as your heart  Follow the directions that came with your blood pressure monitor  Check your blood pressure 2 times, 1 minute apart, before you take your medicine in the morning  Also check your blood pressure before your evening meal  Keep a record of your readings and bring it to your follow-up visits  Ask your healthcare provider what your blood pressure should be  · Manage any other health conditions you have  Health conditions such as diabetes can increase your risk for hypertension  Follow your healthcare provider's instructions and take all your medicines as directed  · Ask about all medicines  Certain medicines can increase your blood pressure  Examples include oral birth control pills, decongestants, herbal supplements, and NSAIDs, such as ibuprofen  Your healthcare provider can tell you which medicines are safe for you to take  This includes prescription and over-the-counter medicines  What lifestyle changes can I make to manage hypertension? Your healthcare provider may recommend you work with a team to manage hypertension  The team may include medical experts such as a dietitian, an exercise or physical therapist, and a behavior therapist  Your family members may be included in helping you create lifestyle changes  · Limit sodium (salt) as directed    Too much sodium can affect your fluid balance  Check labels to find low-sodium or no-salt-added foods  Some low-sodium foods use potassium salts for flavor  Too much potassium can also cause health problems  Your healthcare provider will tell you how much sodium and potassium are safe for you to have in a day  He or she may recommend that you limit sodium to 2,300 mg a day  · Follow the meal plan recommended by your healthcare provider  A dietitian or your provider can give you more information on low-sodium plans or the DASH (Dietary Approaches to Stop Hypertension) eating plan  The DASH plan is low in sodium, processed sugar, unhealthy fats, and total fat  It is high in potassium, calcium, and fiber  These can be found in vegetables, fruit, and whole-grain foods  · Be physically active throughout the day  Physical activity, such as exercise, can help control your blood pressure and your weight  Be physically active for at least 30 minutes per day, on most days of the week  Include aerobic activity, such as walking or riding a bicycle  Also include strength training at least 2 times each week  Your healthcare providers can help you create a physical activity plan  · Decrease stress  This may help lower your blood pressure  Learn ways to relax, such as deep breathing or listening to music  · Limit alcohol as directed  Alcohol can increase your blood pressure  A drink of alcohol is 12 ounces of beer, 5 ounces of wine, or 1½ ounces of liquor  · Do not smoke  Nicotine and other chemicals in cigarettes and cigars can increase your blood pressure and also cause lung damage  Ask your healthcare provider for information if you currently smoke and need help to quit  E-cigarettes or smokeless tobacco still contain nicotine  Talk to your healthcare provider before you use these products  Call your local emergency number (911 in the [address] [place] [address],[address]) or have someone call if:   · You have chest pain      · You have any of the following signs of a heart attack:      ? Squeezing, pressure, or pain in your chest    ? You may  also have any of the following:     § Discomfort or pain in your back, neck, jaw, stomach, or arm    § Shortness of breath    § Nausea or vomiting    § Lightheadedness or a sudden cold sweat    · You become confused or have trouble speaking  · You suddenly feel lightheaded or have trouble breathing  When should I seek immediate care? · You have a severe headache or vision loss  · You have weakness in an arm or leg  When should I call my doctor? · You feel faint, dizzy, confused, or drowsy  · You have been taking your blood pressure medicine but your pressure is higher than your provider says it should be  · You have questions or concerns about your condition or care  CARE AGREEMENT:   You have the right to help plan your care  Learn about your health condition and how it may be treated  Discuss treatment options with your healthcare providers to decide what care you want to receive  You always have the right to refuse treatment  The above information is an  only  It is not intended as medical advice for individual conditions or treatments  Talk to your doctor, nurse or pharmacist before following any medical regimen to see if it is safe and effective for you  © Copyright [employer] 2021 Information is for End User's use only and may not be sold, redistributed or otherwise used for commercial purposes  All illustrations and images included in CareNotes® are the copyrighted property of [employer]  or [address] [place] [address] ([facility]) - (By injection)   Fall Prevention for Older Adults   WHAT YOU NEED TO KNOW:   What is fall prevention? Fall prevention includes ways to make your home and other areas safer  It also includes ways you can move more carefully to prevent a fall  What increases my risk for falls?   As you age, your muscles weaken and your risk for falls increases  Your risk also increases if you take medicines that make you sleepy or dizzy  You may also be at risk if you have vision or joint problems, have low blood pressure, or are not active  How can I help protect myself from falls? Falls are a common cause of injury for older adults  Do the following to help protect yourself:  · Stay active  Exercise can help strengthen your muscles and improve your balance  Your healthcare provider may recommend water aerobics, walking, or [employer]  He or she may also recommend physical therapy to improve your coordination  Never start an exercise program without asking your healthcare provider first             · Wear shoes that fit well and have soles that   Wear shoes both inside and outside  Use slippers with good   Avoid shoes with high heels  · Use assistive devices as directed  Your healthcare provider may suggest that you use a cane or walker to help you keep your balance  You may need to have grab bars put in your bathroom near the toilet or in the shower  · Stand or sit up slowly  This may help you keep your balance and prevent falls  · Wear a personal alarm  This is a device that allows you to call 911 if you need help  Ask your healthcare provider for more information  · Manage your medical conditions  Keep all appointments with your healthcare providers  Visit your eye doctor as directed  How can I make my home safer? · Add items to prevent falls in the bathroom  Put nonslip strips on your bath or shower floor to prevent you from slipping  Use a bath mat if you do not have carpet in the bathroom  This will prevent you from falling when you step out of the bath or shower  Use a shower seat so you do not need to stand while you shower  Sit on the toilet or a chair in your bathroom to dry yourself and put on clothing   This will prevent you from losing your balance from drying or dressing yourself while you are standing  · Keep paths clear  Remove books, shoes, and other objects from walkways and stairs  Place cords for telephones and lamps out of the way so that you do not need to walk over them  Tape them down if you cannot move them  Remove small rugs  If you cannot remove a rug, secure it with double-sided tape  This will prevent you from tripping  · Install bright lights in your home  Use night lights to help light paths to the bathroom or kitchen  Always turn on the light before you start walking  · Keep items you use often on shelves within reach  Do not use a step stool to help you reach an item  · Paint or place reflective tape on the edges of your stairs  This will help you see the stairs better  Call 911 or have someone else call if:   · You have fallen and are unconscious  · You have fallen and cannot move part of your body  When should I contact my healthcare provider? · You have fallen and have pain or a headache  · You have questions or concerns about your condition or care  CARE AGREEMENT:   You have the right to help plan your care  Learn about your health condition and how it may be treated  Discuss treatment options with your healthcare providers to decide what care you want to receive  You always have the right to refuse treatment  The above information is an  only  It is not intended as medical advice for individual conditions or treatments  Talk to your doctor, nurse or pharmacist before following any medical regimen to see if it is safe and effective for you  © Copyright [employer] 2021 Information is for End User's use only and may not be sold, redistributed or otherwise used for commercial purposes  All illustrations and images included in CareNotes® are the copyrighted property of [employer]  or [facility]   Why this medicine is used:   Treats type 2 diabetes   Also lowers risk of death, heart attack, or stroke in patients with diabetes and heart or blood vessel problems  Contact a nurse or doctor right away if you have:  · Blurred vision, changes in vision  · Change in how much or how often you urinate  · Lump or swelling in your neck, trouble breathing or swallowing  · Shaking, trembling, sweating, fast or pounding heartbeat, lightheadedness, hunger, confusion  · Sudden and severe stomach pain, nausea, vomiting, fever, and lightheadedness     Common side effects:  · Diarrhea  © Copyright [address] 2021 Information is for End User's use only and may not be sold, redistributed or otherwise used for commercial purposes

## 2021-08-25 NOTE — ASSESSMENT & PLAN NOTE
Patient's blood sugar 277 nonfasting  Patient currently taking Amaryl 4 mg 2 times a day  Her daughter reports she has not been taking the Trulicity  She has not been taking the metformin as well due to abdominal cramping  Patient noncompliant with therapy  Instructed that abdominal cramping and diarrhea will resolve after some time  We Trulicity stop    Lab Results   Component Value Date    HGBA1C 7 8 (A) 07/28/2021

## 2021-08-30 ENCOUNTER — PATIENT OUTREACH (OUTPATIENT)
Dept: CASE MANAGEMENT | Facility: OTHER | Age: 86
End: 2021-08-30

## 2021-08-30 DIAGNOSIS — I48.0 PAROXYSMAL ATRIAL FIBRILLATION (HCC): Primary | ICD-10-CM

## 2021-09-13 DIAGNOSIS — D32.0 CEREBELLOPONTINE ANGLE MENINGIOMA (HCC): Primary | ICD-10-CM

## 2021-09-13 DIAGNOSIS — I63.9 CEREBROVASCULAR ACCIDENT (CVA), UNSPECIFIED MECHANISM (HCC): ICD-10-CM

## 2021-09-13 DIAGNOSIS — J34.9: ICD-10-CM

## 2021-09-13 NOTE — PROGRESS NOTES
Prior mri recommendations for ENT consult for Opacifications of the sphenoid sinus  On the left the opacification is homogenous with slight expansion suggestive of mucoceles  Recent CT scan demonstrated thinning of superior lateral wall of the sphenoid sinus with mild focal erosion of the bone immediately below the anterior clinoid

## 2021-10-14 ENCOUNTER — PATIENT OUTREACH (OUTPATIENT)
Dept: CASE MANAGEMENT | Facility: OTHER | Age: 86
End: 2021-10-14

## 2021-11-28 ENCOUNTER — APPOINTMENT (EMERGENCY)
Dept: RADIOLOGY | Facility: HOSPITAL | Age: 86
DRG: 871 | End: 2021-11-28
Payer: MEDICARE

## 2021-11-28 ENCOUNTER — HOSPITAL ENCOUNTER (INPATIENT)
Facility: HOSPITAL | Age: 86
LOS: 4 days | Discharge: NURSING, REHAB OR LONG-TERM CARE | DRG: 871 | End: 2021-12-02
Attending: EMERGENCY MEDICINE | Admitting: INTERNAL MEDICINE
Payer: MEDICARE

## 2021-11-28 ENCOUNTER — APPOINTMENT (EMERGENCY)
Dept: CT IMAGING | Facility: HOSPITAL | Age: 86
DRG: 871 | End: 2021-11-28
Payer: MEDICARE

## 2021-11-28 DIAGNOSIS — R42 DIZZINESS: ICD-10-CM

## 2021-11-28 DIAGNOSIS — F02.80 DEMENTIA ASSOCIATED WITH OTHER UNDERLYING DISEASE WITHOUT BEHAVIORAL DISTURBANCE (HCC): ICD-10-CM

## 2021-11-28 DIAGNOSIS — S06.5X9A SUBDURAL HEMATOMA (HCC): ICD-10-CM

## 2021-11-28 DIAGNOSIS — J96.01 ACUTE RESPIRATORY FAILURE WITH HYPOXIA (HCC): ICD-10-CM

## 2021-11-28 DIAGNOSIS — Z86.79 HISTORY OF ATRIAL FIBRILLATION: ICD-10-CM

## 2021-11-28 DIAGNOSIS — Z91.81 HISTORY OF RECENT FALL: ICD-10-CM

## 2021-11-28 DIAGNOSIS — R82.90 ABNORMAL URINALYSIS: ICD-10-CM

## 2021-11-28 DIAGNOSIS — R29.898 LEFT LEG WEAKNESS: ICD-10-CM
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
## 2021-11-28 DIAGNOSIS — R26.2 AMBULATORY DYSFUNCTION: ICD-10-CM

## 2021-11-28 DIAGNOSIS — J18.9 COMMUNITY ACQUIRED PNEUMONIA OF LEFT UPPER LOBE OF LUNG: ICD-10-CM

## 2021-11-28 DIAGNOSIS — I10 HYPERTENSION: ICD-10-CM

## 2021-11-28 DIAGNOSIS — I67.9 INTRACRANIAL VASCULAR STENOSIS: ICD-10-CM

## 2021-11-28 DIAGNOSIS — J18.9 ACUTE PNEUMONIA: Primary | ICD-10-CM

## 2021-11-28 DIAGNOSIS — E11.65 UNCONTROLLED TYPE 2 DIABETES MELLITUS WITH HYPERGLYCEMIA (HCC): ICD-10-CM

## 2021-11-28 PROBLEM — A41.9 SEPSIS (HCC): Status: ACTIVE | Noted: 2021-11-28

## 2021-11-28 LAB
2HR DELTA HS TROPONIN: -5 NG/L
4HR DELTA HS TROPONIN: -5 NG/L
ALBUMIN SERPL BCP-MCNC: 3.5 G/DL (ref 3.4–4.8)
ALP SERPL-CCNC: 88.5 U/L (ref 35–140)
ALT SERPL W P-5'-P-CCNC: 15 U/L (ref 5–54)
AMORPH PHOS CRY URNS QL MICRO: ABNORMAL /HPF
ANION GAP SERPL CALCULATED.3IONS-SCNC: 11 MMOL/L (ref 4–13)
ANION GAP SERPL CALCULATED.3IONS-SCNC: 13 MMOL/L (ref 4–13)
APTT PPP: 30 SECONDS (ref 23–37)
AST SERPL W P-5'-P-CCNC: 11 U/L (ref 15–41)
BACTERIA UR QL AUTO: ABNORMAL /HPF
BASOPHILS # BLD MANUAL: 0 THOUSAND/UL (ref 0–0.1)
BASOPHILS NFR MAR MANUAL: 0 % (ref 0–1)
BILIRUB SERPL-MCNC: 0.74 MG/DL (ref 0.3–1.2)
BILIRUB UR QL STRIP: NEGATIVE
BNP SERPL-MCNC: 211.6 PG/ML (ref 1–100)
BUN SERPL-MCNC: 27 MG/DL (ref 6–20)
BUN SERPL-MCNC: 28 MG/DL (ref 6–20)
CALCIUM SERPL-MCNC: 10.2 MG/DL (ref 8.4–10.2)
CALCIUM SERPL-MCNC: 10.8 MG/DL (ref 8.4–10.2)
CARDIAC TROPONIN I PNL SERPL HS: 20 NG/L
CARDIAC TROPONIN I PNL SERPL HS: 20 NG/L
CARDIAC TROPONIN I PNL SERPL HS: 25 NG/L
CHLORIDE SERPL-SCNC: 100 MMOL/L (ref 96–108)
CHLORIDE SERPL-SCNC: 102 MMOL/L (ref 96–108)
CK MB SERPL-MCNC: 2.8 % (ref 0–2.5)
CK MB SERPL-MCNC: 4.3 NG/ML (ref 0.1–5.9)
CK SERPL-CCNC: 152.4 U/L (ref 38–234)
CLARITY UR: ABNORMAL
CO2 SERPL-SCNC: 21 MMOL/L (ref 22–33)
CO2 SERPL-SCNC: 23 MMOL/L (ref 22–33)
COLOR UR: YELLOW
CREAT SERPL-MCNC: 0.59 MG/DL (ref 0.4–1.1)
CREAT SERPL-MCNC: 0.6 MG/DL (ref 0.4–1.1)
DOHLE BOD BLD QL SMEAR: PRESENT
EOSINOPHIL # BLD MANUAL: 0 THOUSAND/UL (ref 0–0.4)
EOSINOPHIL NFR BLD MANUAL: 0 % (ref 0–6)
ERYTHROCYTE [DISTWIDTH] IN BLOOD BY AUTOMATED COUNT: 13.1 % (ref 11.6–15.1)
FLUAV RNA RESP QL NAA+PROBE: NEGATIVE
FLUBV RNA RESP QL NAA+PROBE: NEGATIVE
GFR SERPL CREATININE-BSD FRML MDRD: 78 ML/MIN/1.73SQ M
GFR SERPL CREATININE-BSD FRML MDRD: 79 ML/MIN/1.73SQ M
GLUCOSE SERPL-MCNC: 286 MG/DL (ref 65–140)
GLUCOSE SERPL-MCNC: 376 MG/DL (ref 65–140)
GLUCOSE SERPL-MCNC: 398 MG/DL (ref 65–140)
GLUCOSE SERPL-MCNC: 420 MG/DL (ref 65–140)
GLUCOSE SERPL-MCNC: 507 MG/DL (ref 65–140)
GLUCOSE SERPL-MCNC: >500 MG/DL (ref 65–140)
GLUCOSE UR STRIP-MCNC: ABNORMAL MG/DL
HCT VFR BLD AUTO: 43.1 % (ref 34.8–46.1)
HGB BLD-MCNC: 14.6 G/DL (ref 11.5–15.4)
HGB UR QL STRIP.AUTO: ABNORMAL
INR PPP: 1.33 (ref 0.84–1.19)
KETONES UR STRIP-MCNC: ABNORMAL MG/DL
L PNEUMO1 AG UR QL IA.RAPID: NEGATIVE
LACTATE SERPL-SCNC: 1.2 MMOL/L (ref 0–2)
LEUKOCYTE ESTERASE UR QL STRIP: NEGATIVE
LYMPHOCYTES # BLD AUTO: 1.8 THOUSAND/UL (ref 0.6–4.47)
LYMPHOCYTES # BLD AUTO: 8 % (ref 14–44)
MAGNESIUM SERPL-MCNC: 1.9 MG/DL (ref 1.6–2.6)
MCH RBC QN AUTO: 29.7 PG (ref 26.8–34.3)
MCHC RBC AUTO-ENTMCNC: 33.9 G/DL (ref 31.4–37.4)
MCV RBC AUTO: 88 FL (ref 82–98)
MONOCYTES # BLD AUTO: 1.57 THOUSAND/UL (ref 0–1.22)
MONOCYTES NFR BLD: 7 % (ref 4–12)
NEUTROPHILS # BLD MANUAL: 19.12 THOUSAND/UL (ref 1.85–7.62)
NEUTS BAND NFR BLD MANUAL: 5 % (ref 0–8)
NEUTS SEG NFR BLD AUTO: 80 % (ref 43–75)
NITRITE UR QL STRIP: NEGATIVE
NON-SQ EPI CELLS URNS QL MICRO: ABNORMAL /HPF
PH UR STRIP.AUTO: 7.5 [PH]
PLATELET # BLD AUTO: 342 THOUSANDS/UL (ref 149–390)
PLATELET BLD QL SMEAR: ADEQUATE
PMV BLD AUTO: 10.4 FL (ref 8.9–12.7)
POTASSIUM SERPL-SCNC: 3.3 MMOL/L (ref 3.5–5)
POTASSIUM SERPL-SCNC: 3.8 MMOL/L (ref 3.5–5)
PROCALCITONIN SERPL-MCNC: 0.81 NG/ML
PROT SERPL-MCNC: 7.4 G/DL (ref 6.4–8.3)
PROT UR STRIP-MCNC: ABNORMAL MG/DL
PROTHROMBIN TIME: 16.3 SECONDS (ref 11.6–14.5)
RBC # BLD AUTO: 4.91 MILLION/UL (ref 3.81–5.12)
RBC #/AREA URNS AUTO: ABNORMAL /HPF
RBC MORPH BLD: NORMAL
RSV RNA RESP QL NAA+PROBE: NEGATIVE
S PNEUM AG UR QL: NEGATIVE
SARS-COV-2 RNA RESP QL NAA+PROBE: NEGATIVE
SODIUM SERPL-SCNC: 134 MMOL/L (ref 133–145)
SODIUM SERPL-SCNC: 136 MMOL/L (ref 133–145)
SP GR UR STRIP.AUTO: 1.01 (ref 1–1.03)
UROBILINOGEN UR QL STRIP.AUTO: 0.2 E.U./DL
WBC # BLD AUTO: 22.49 THOUSAND/UL (ref 4.31–10.16)
WBC #/AREA URNS AUTO: ABNORMAL /HPF
WBC TOXIC VACUOLES BLD QL SMEAR: PRESENT

## 2021-11-28 PROCEDURE — 82948 REAGENT STRIP/BLOOD GLUCOSE: CPT

## 2021-11-28 PROCEDURE — 0241U HB NFCT DS VIR RESP RNA 4 TRGT: CPT | Performed by: EMERGENCY MEDICINE

## 2021-11-28 PROCEDURE — 99223 1ST HOSP IP/OBS HIGH 75: CPT | Performed by: INTERNAL MEDICINE

## 2021-11-28 PROCEDURE — 85730 THROMBOPLASTIN TIME PARTIAL: CPT | Performed by: EMERGENCY MEDICINE

## 2021-11-28 PROCEDURE — 83605 ASSAY OF LACTIC ACID: CPT | Performed by: EMERGENCY MEDICINE

## 2021-11-28 PROCEDURE — 85610 PROTHROMBIN TIME: CPT | Performed by: EMERGENCY MEDICINE

## 2021-11-28 PROCEDURE — 80053 COMPREHEN METABOLIC PANEL: CPT | Performed by: EMERGENCY MEDICINE

## 2021-11-28 PROCEDURE — 70498 CT ANGIOGRAPHY NECK: CPT

## 2021-11-28 PROCEDURE — 87186 SC STD MICRODIL/AGAR DIL: CPT | Performed by: EMERGENCY MEDICINE

## 2021-11-28 PROCEDURE — 71045 X-RAY EXAM CHEST 1 VIEW: CPT

## 2021-11-28 PROCEDURE — 82553 CREATINE MB FRACTION: CPT | Performed by: EMERGENCY MEDICINE

## 2021-11-28 PROCEDURE — 99285 EMERGENCY DEPT VISIT HI MDM: CPT | Performed by: EMERGENCY MEDICINE

## 2021-11-28 PROCEDURE — 96365 THER/PROPH/DIAG IV INF INIT: CPT

## 2021-11-28 PROCEDURE — 80048 BASIC METABOLIC PNL TOTAL CA: CPT | Performed by: INTERNAL MEDICINE

## 2021-11-28 PROCEDURE — 83880 ASSAY OF NATRIURETIC PEPTIDE: CPT | Performed by: EMERGENCY MEDICINE

## 2021-11-28 PROCEDURE — 82550 ASSAY OF CK (CPK): CPT | Performed by: EMERGENCY MEDICINE

## 2021-11-28 PROCEDURE — 70496 CT ANGIOGRAPHY HEAD: CPT

## 2021-11-28 PROCEDURE — 36415 COLL VENOUS BLD VENIPUNCTURE: CPT | Performed by: EMERGENCY MEDICINE

## 2021-11-28 PROCEDURE — 71260 CT THORAX DX C+: CPT

## 2021-11-28 PROCEDURE — 87449 NOS EACH ORGANISM AG IA: CPT | Performed by: PHYSICIAN ASSISTANT

## 2021-11-28 PROCEDURE — 85007 BL SMEAR W/DIFF WBC COUNT: CPT | Performed by: EMERGENCY MEDICINE

## 2021-11-28 PROCEDURE — 74177 CT ABD & PELVIS W/CONTRAST: CPT

## 2021-11-28 PROCEDURE — 84484 ASSAY OF TROPONIN QUANT: CPT | Performed by: PHYSICIAN ASSISTANT

## 2021-11-28 PROCEDURE — 85027 COMPLETE CBC AUTOMATED: CPT | Performed by: EMERGENCY MEDICINE

## 2021-11-28 PROCEDURE — 93005 ELECTROCARDIOGRAM TRACING: CPT

## 2021-11-28 PROCEDURE — 83735 ASSAY OF MAGNESIUM: CPT | Performed by: EMERGENCY MEDICINE

## 2021-11-28 PROCEDURE — 84484 ASSAY OF TROPONIN QUANT: CPT | Performed by: EMERGENCY MEDICINE

## 2021-11-28 PROCEDURE — 96367 TX/PROPH/DG ADDL SEQ IV INF: CPT

## 2021-11-28 PROCEDURE — 84145 PROCALCITONIN (PCT): CPT | Performed by: PHYSICIAN ASSISTANT

## 2021-11-28 PROCEDURE — 81001 URINALYSIS AUTO W/SCOPE: CPT | Performed by: EMERGENCY MEDICINE

## 2021-11-28 PROCEDURE — 87077 CULTURE AEROBIC IDENTIFY: CPT | Performed by: EMERGENCY MEDICINE

## 2021-11-28 PROCEDURE — 99285 EMERGENCY DEPT VISIT HI MDM: CPT

## 2021-11-28 PROCEDURE — 87040 BLOOD CULTURE FOR BACTERIA: CPT | Performed by: EMERGENCY MEDICINE

## 2021-11-28 PROCEDURE — 87086 URINE CULTURE/COLONY COUNT: CPT | Performed by: EMERGENCY MEDICINE

## 2021-11-28 RX ORDER — POTASSIUM CHLORIDE 14.9 MG/ML
20 INJECTION INTRAVENOUS ONCE
Status: COMPLETED | OUTPATIENT
Start: 2021-11-28 | End: 2021-11-28

## 2021-11-28 RX ORDER — CALCIUM CARBONATE 200(500)MG
1000 TABLET,CHEWABLE ORAL DAILY PRN
Status: DISCONTINUED | OUTPATIENT
Start: 2021-11-28 | End: 2021-11-29

## 2021-11-28 RX ORDER — CEFTRIAXONE 1 G/50ML
1000 INJECTION, SOLUTION INTRAVENOUS EVERY 24 HOURS
Status: DISCONTINUED | OUTPATIENT
Start: 2021-11-29 | End: 2021-12-02

## 2021-11-28 RX ORDER — MIRTAZAPINE 15 MG/1
15 TABLET, FILM COATED ORAL
Status: DISCONTINUED | OUTPATIENT
Start: 2021-11-28 | End: 2021-12-02 | Stop reason: HOSPADM

## 2021-11-28 RX ORDER — AMLODIPINE BESYLATE 10 MG/1
10 TABLET ORAL DAILY
Status: DISCONTINUED | OUTPATIENT
Start: 2021-11-28 | End: 2021-12-02 | Stop reason: HOSPADM

## 2021-11-28 RX ORDER — INSULIN GLARGINE 100 [IU]/ML
10 INJECTION, SOLUTION SUBCUTANEOUS
Status: DISCONTINUED | OUTPATIENT
Start: 2021-11-28 | End: 2021-12-01

## 2021-11-28 RX ORDER — ONDANSETRON 2 MG/ML
4 INJECTION INTRAMUSCULAR; INTRAVENOUS EVERY 6 HOURS PRN
Status: DISCONTINUED | OUTPATIENT
Start: 2021-11-28 | End: 2021-12-02 | Stop reason: HOSPADM

## 2021-11-28 RX ORDER — CEFTRIAXONE 1 G/50ML
1000 INJECTION, SOLUTION INTRAVENOUS ONCE
Status: COMPLETED | OUTPATIENT
Start: 2021-11-28 | End: 2021-11-28

## 2021-11-28 RX ORDER — LEVALBUTEROL 1.25 MG/.5ML
1.25 SOLUTION, CONCENTRATE RESPIRATORY (INHALATION) 3 TIMES DAILY PRN
Status: DISCONTINUED | OUTPATIENT
Start: 2021-11-28 | End: 2021-12-02 | Stop reason: HOSPADM

## 2021-11-28 RX ORDER — LOPERAMIDE HYDROCHLORIDE 2 MG/1
2 CAPSULE ORAL 4 TIMES DAILY PRN
Status: DISCONTINUED | OUTPATIENT
Start: 2021-11-28 | End: 2021-12-02 | Stop reason: HOSPADM

## 2021-11-28 RX ORDER — ACETAMINOPHEN 325 MG/1
975 TABLET ORAL EVERY 8 HOURS SCHEDULED
Status: DISCONTINUED | OUTPATIENT
Start: 2021-11-28 | End: 2021-12-02 | Stop reason: HOSPADM

## 2021-11-28 RX ORDER — LEVOTHYROXINE SODIUM 0.03 MG/1
25 TABLET ORAL
Status: DISCONTINUED | OUTPATIENT
Start: 2021-11-28 | End: 2021-12-02 | Stop reason: HOSPADM

## 2021-11-28 RX ORDER — GUAIFENESIN 600 MG
600 TABLET, EXTENDED RELEASE 12 HR ORAL 2 TIMES DAILY
Status: DISCONTINUED | OUTPATIENT
Start: 2021-11-28 | End: 2021-12-01

## 2021-11-28 RX ORDER — SODIUM CHLORIDE, SODIUM LACTATE, POTASSIUM CHLORIDE, CALCIUM CHLORIDE 600; 310; 30; 20 MG/100ML; MG/100ML; MG/100ML; MG/100ML
75 INJECTION, SOLUTION INTRAVENOUS CONTINUOUS
Status: DISCONTINUED | OUTPATIENT
Start: 2021-11-28 | End: 2021-12-01

## 2021-11-28 RX ADMIN — CEFTRIAXONE 1000 MG: 1 INJECTION, SOLUTION INTRAVENOUS at 02:59

## 2021-11-28 RX ADMIN — INSULIN GLARGINE 10 UNITS: 100 INJECTION, SOLUTION SUBCUTANEOUS at 21:02

## 2021-11-28 RX ADMIN — INSULIN LISPRO 2 UNITS: 100 INJECTION, SOLUTION INTRAVENOUS; SUBCUTANEOUS at 21:14

## 2021-11-28 RX ADMIN — GUAIFENESIN 600 MG: 600 TABLET ORAL at 09:03

## 2021-11-28 RX ADMIN — SODIUM CHLORIDE, SODIUM LACTATE, POTASSIUM CHLORIDE, AND CALCIUM CHLORIDE 75 ML/HR: .6; .31; .03; .02 INJECTION, SOLUTION INTRAVENOUS at 07:08

## 2021-11-28 RX ADMIN — INSULIN LISPRO 4 UNITS: 100 INJECTION, SOLUTION INTRAVENOUS; SUBCUTANEOUS at 16:05

## 2021-11-28 RX ADMIN — POTASSIUM CHLORIDE 20 MEQ: 14.9 INJECTION, SOLUTION INTRAVENOUS at 04:05

## 2021-11-28 RX ADMIN — AMLODIPINE BESYLATE 10 MG: 10 TABLET ORAL at 07:09

## 2021-11-28 RX ADMIN — INSULIN LISPRO 5 UNITS: 100 INJECTION, SOLUTION INTRAVENOUS; SUBCUTANEOUS at 12:44

## 2021-11-28 RX ADMIN — INSULIN LISPRO 8 UNITS: 100 INJECTION, SOLUTION INTRAVENOUS; SUBCUTANEOUS at 12:44

## 2021-11-28 RX ADMIN — AZITHROMYCIN MONOHYDRATE 500 MG: 500 INJECTION, POWDER, LYOPHILIZED, FOR SOLUTION INTRAVENOUS at 15:21

## 2021-11-28 RX ADMIN — LEVOTHYROXINE SODIUM 25 MCG: 25 TABLET ORAL at 07:09

## 2021-11-28 RX ADMIN — ACETAMINOPHEN 975 MG: 325 TABLET, FILM COATED ORAL at 07:08

## 2021-11-28 RX ADMIN — IOHEXOL 100 ML: 350 INJECTION, SOLUTION INTRAVENOUS at 04:11

## 2021-11-28 RX ADMIN — ACETAMINOPHEN 975 MG: 325 TABLET, FILM COATED ORAL at 21:02

## 2021-11-28 RX ADMIN — INSULIN LISPRO 3 UNITS: 100 INJECTION, SOLUTION INTRAVENOUS; SUBCUTANEOUS at 16:04

## 2021-11-28 RX ADMIN — APIXABAN 5 MG: 5 TABLET, FILM COATED ORAL at 17:05

## 2021-11-28 RX ADMIN — MIRTAZAPINE 15 MG: 15 TABLET, FILM COATED ORAL at 21:02

## 2021-11-28 RX ADMIN — GUAIFENESIN 600 MG: 600 TABLET ORAL at 17:05

## 2021-11-28 RX ADMIN — SODIUM CHLORIDE 500 ML: 0.9 INJECTION, SOLUTION INTRAVENOUS at 04:10

## 2021-11-28 RX ADMIN — APIXABAN 5 MG: 5 TABLET, FILM COATED ORAL at 09:03

## 2021-11-29 PROBLEM — E83.52 HYPERCALCEMIA: Status: ACTIVE | Noted: 2021-11-29

## 2021-11-29 LAB
ALBUMIN SERPL BCP-MCNC: 3.1 G/DL (ref 3.4–4.8)
ALP SERPL-CCNC: 75.6 U/L (ref 35–140)
ALT SERPL W P-5'-P-CCNC: 17 U/L (ref 5–54)
ANION GAP SERPL CALCULATED.3IONS-SCNC: 9 MMOL/L (ref 4–13)
AST SERPL W P-5'-P-CCNC: 20 U/L (ref 15–41)
BILIRUB SERPL-MCNC: 0.47 MG/DL (ref 0.3–1.2)
BUN SERPL-MCNC: 30 MG/DL (ref 6–20)
CALCIUM ALBUM COR SERPL-MCNC: 11.3 MG/DL (ref 8.3–10.1)
CALCIUM SERPL-MCNC: 10.6 MG/DL (ref 8.4–10.2)
CHLORIDE SERPL-SCNC: 104 MMOL/L (ref 96–108)
CO2 SERPL-SCNC: 27 MMOL/L (ref 22–33)
CREAT SERPL-MCNC: 0.55 MG/DL (ref 0.4–1.1)
EST. AVERAGE GLUCOSE BLD GHB EST-MCNC: 194 MG/DL
GFR SERPL CREATININE-BSD FRML MDRD: 81 ML/MIN/1.73SQ M
GLUCOSE SERPL-MCNC: 157 MG/DL (ref 65–140)
GLUCOSE SERPL-MCNC: 208 MG/DL (ref 65–140)
GLUCOSE SERPL-MCNC: 234 MG/DL (ref 65–140)
GLUCOSE SERPL-MCNC: 261 MG/DL (ref 65–140)
GLUCOSE SERPL-MCNC: 317 MG/DL (ref 65–140)
HBA1C MFR BLD: 8.4 %
MAGNESIUM SERPL-MCNC: 2 MG/DL (ref 1.6–2.6)
POTASSIUM SERPL-SCNC: 3.2 MMOL/L (ref 3.5–5)
PROCALCITONIN SERPL-MCNC: 0.77 NG/ML
PROT SERPL-MCNC: 6.6 G/DL (ref 6.4–8.3)
PTH-INTACT SERPL-MCNC: 23.9 PG/ML (ref 18.4–80.1)
SODIUM SERPL-SCNC: 140 MMOL/L (ref 133–145)

## 2021-11-29 PROCEDURE — 80053 COMPREHEN METABOLIC PANEL: CPT | Performed by: PHYSICIAN ASSISTANT

## 2021-11-29 PROCEDURE — 99233 SBSQ HOSP IP/OBS HIGH 50: CPT | Performed by: INTERNAL MEDICINE

## 2021-11-29 PROCEDURE — 97163 PT EVAL HIGH COMPLEX 45 MIN: CPT

## 2021-11-29 PROCEDURE — 97167 OT EVAL HIGH COMPLEX 60 MIN: CPT

## 2021-11-29 PROCEDURE — G0425 INPT/ED TELECONSULT30: HCPCS | Performed by: NURSE PRACTITIONER

## 2021-11-29 PROCEDURE — 82948 REAGENT STRIP/BLOOD GLUCOSE: CPT

## 2021-11-29 PROCEDURE — 83735 ASSAY OF MAGNESIUM: CPT | Performed by: PHYSICIAN ASSISTANT

## 2021-11-29 PROCEDURE — 84145 PROCALCITONIN (PCT): CPT | Performed by: PHYSICIAN ASSISTANT

## 2021-11-29 PROCEDURE — 83036 HEMOGLOBIN GLYCOSYLATED A1C: CPT | Performed by: INTERNAL MEDICINE

## 2021-11-29 PROCEDURE — 83970 ASSAY OF PARATHORMONE: CPT | Performed by: INTERNAL MEDICINE

## 2021-11-29 RX ORDER — POTASSIUM CHLORIDE 20MEQ/15ML
40 LIQUID (ML) ORAL ONCE
Status: COMPLETED | OUTPATIENT
Start: 2021-11-29 | End: 2021-11-29

## 2021-11-29 RX ADMIN — INSULIN LISPRO 3 UNITS: 100 INJECTION, SOLUTION INTRAVENOUS; SUBCUTANEOUS at 16:39

## 2021-11-29 RX ADMIN — INSULIN LISPRO 1 UNITS: 100 INJECTION, SOLUTION INTRAVENOUS; SUBCUTANEOUS at 16:38

## 2021-11-29 RX ADMIN — INSULIN LISPRO 1 UNITS: 100 INJECTION, SOLUTION INTRAVENOUS; SUBCUTANEOUS at 12:29

## 2021-11-29 RX ADMIN — INSULIN LISPRO 3 UNITS: 100 INJECTION, SOLUTION INTRAVENOUS; SUBCUTANEOUS at 12:29

## 2021-11-29 RX ADMIN — AMLODIPINE BESYLATE 10 MG: 10 TABLET ORAL at 08:38

## 2021-11-29 RX ADMIN — MIRTAZAPINE 15 MG: 15 TABLET, FILM COATED ORAL at 21:18

## 2021-11-29 RX ADMIN — ACETAMINOPHEN 975 MG: 325 TABLET, FILM COATED ORAL at 05:22

## 2021-11-29 RX ADMIN — ACETAMINOPHEN 975 MG: 325 TABLET, FILM COATED ORAL at 14:33

## 2021-11-29 RX ADMIN — POTASSIUM CHLORIDE 40 MEQ: 20 SOLUTION ORAL at 09:43

## 2021-11-29 RX ADMIN — LEVOTHYROXINE SODIUM 25 MCG: 25 TABLET ORAL at 05:22

## 2021-11-29 RX ADMIN — ACETAMINOPHEN 975 MG: 325 TABLET, FILM COATED ORAL at 21:18

## 2021-11-29 RX ADMIN — INSULIN LISPRO 2 UNITS: 100 INJECTION, SOLUTION INTRAVENOUS; SUBCUTANEOUS at 21:22

## 2021-11-29 RX ADMIN — GUAIFENESIN 600 MG: 600 TABLET ORAL at 08:38

## 2021-11-29 RX ADMIN — INSULIN LISPRO 3 UNITS: 100 INJECTION, SOLUTION INTRAVENOUS; SUBCUTANEOUS at 08:37

## 2021-11-29 RX ADMIN — INSULIN LISPRO 3 UNITS: 100 INJECTION, SOLUTION INTRAVENOUS; SUBCUTANEOUS at 08:38

## 2021-11-29 RX ADMIN — SODIUM CHLORIDE, SODIUM LACTATE, POTASSIUM CHLORIDE, AND CALCIUM CHLORIDE 75 ML/HR: .6; .31; .03; .02 INJECTION, SOLUTION INTRAVENOUS at 13:38

## 2021-11-29 RX ADMIN — GUAIFENESIN 600 MG: 600 TABLET ORAL at 17:02

## 2021-11-29 RX ADMIN — CEFTRIAXONE 1000 MG: 1 INJECTION, SOLUTION INTRAVENOUS at 05:22

## 2021-11-29 RX ADMIN — APIXABAN 5 MG: 5 TABLET, FILM COATED ORAL at 08:38

## 2021-11-29 RX ADMIN — INSULIN GLARGINE 10 UNITS: 100 INJECTION, SOLUTION SUBCUTANEOUS at 21:22

## 2021-11-30 LAB
ANION GAP SERPL CALCULATED.3IONS-SCNC: 14 MMOL/L (ref 4–13)
ATRIAL RATE: 103 BPM
BACTERIA UR CULT: ABNORMAL
BUN SERPL-MCNC: 21 MG/DL (ref 6–20)
CALCIUM SERPL-MCNC: 8.9 MG/DL (ref 8.4–10.2)
CHLORIDE SERPL-SCNC: 104 MMOL/L (ref 96–108)
CO2 SERPL-SCNC: 20 MMOL/L (ref 22–33)
CREAT SERPL-MCNC: 0.36 MG/DL (ref 0.4–1.1)
ERYTHROCYTE [DISTWIDTH] IN BLOOD BY AUTOMATED COUNT: 13.2 % (ref 11.6–15.1)
EST. AVERAGE GLUCOSE BLD GHB EST-MCNC: 194 MG/DL
GFR SERPL CREATININE-BSD FRML MDRD: 93 ML/MIN/1.73SQ M
GLUCOSE SERPL-MCNC: 215 MG/DL (ref 65–140)
GLUCOSE SERPL-MCNC: 219 MG/DL (ref 65–140)
GLUCOSE SERPL-MCNC: 223 MG/DL (ref 65–140)
GLUCOSE SERPL-MCNC: 226 MG/DL (ref 65–140)
GLUCOSE SERPL-MCNC: 275 MG/DL (ref 65–140)
HBA1C MFR BLD: 8.4 %
HCT VFR BLD AUTO: 35 % (ref 34.8–46.1)
HGB BLD-MCNC: 11.6 G/DL (ref 11.5–15.4)
MCH RBC QN AUTO: 29.9 PG (ref 26.8–34.3)
MCHC RBC AUTO-ENTMCNC: 33.1 G/DL (ref 31.4–37.4)
MCV RBC AUTO: 90 FL (ref 82–98)
P AXIS: 65 DEGREES
PLATELET # BLD AUTO: 284 THOUSANDS/UL (ref 149–390)
PMV BLD AUTO: 9.9 FL (ref 8.9–12.7)
POTASSIUM SERPL-SCNC: 3.5 MMOL/L (ref 3.5–5)
PR INTERVAL: 173 MS
QRS AXIS: -29 DEGREES
QRSD INTERVAL: 81 MS
QT INTERVAL: 358 MS
QTC INTERVAL: 469 MS
RBC # BLD AUTO: 3.88 MILLION/UL (ref 3.81–5.12)
SODIUM SERPL-SCNC: 138 MMOL/L (ref 133–145)
T WAVE AXIS: 67 DEGREES
VENTRICULAR RATE: 103 BPM
WBC # BLD AUTO: 7.65 THOUSAND/UL (ref 4.31–10.16)

## 2021-11-30 PROCEDURE — 85027 COMPLETE CBC AUTOMATED: CPT | Performed by: INTERNAL MEDICINE

## 2021-11-30 PROCEDURE — 80048 BASIC METABOLIC PNL TOTAL CA: CPT | Performed by: INTERNAL MEDICINE

## 2021-11-30 PROCEDURE — 82948 REAGENT STRIP/BLOOD GLUCOSE: CPT

## 2021-11-30 PROCEDURE — 93010 ELECTROCARDIOGRAM REPORT: CPT | Performed by: INTERNAL MEDICINE

## 2021-11-30 PROCEDURE — 99232 SBSQ HOSP IP/OBS MODERATE 35: CPT | Performed by: PHYSICIAN ASSISTANT

## 2021-11-30 RX ADMIN — SODIUM CHLORIDE, SODIUM LACTATE, POTASSIUM CHLORIDE, AND CALCIUM CHLORIDE 75 ML/HR: .6; .31; .03; .02 INJECTION, SOLUTION INTRAVENOUS at 03:09

## 2021-11-30 RX ADMIN — LEVOTHYROXINE SODIUM 25 MCG: 25 TABLET ORAL at 09:38

## 2021-11-30 RX ADMIN — ACETAMINOPHEN 975 MG: 325 TABLET, FILM COATED ORAL at 21:18

## 2021-11-30 RX ADMIN — INSULIN LISPRO 2 UNITS: 100 INJECTION, SOLUTION INTRAVENOUS; SUBCUTANEOUS at 17:48

## 2021-11-30 RX ADMIN — CEFTRIAXONE 1000 MG: 1 INJECTION, SOLUTION INTRAVENOUS at 03:12

## 2021-11-30 RX ADMIN — ACETAMINOPHEN 975 MG: 325 TABLET, FILM COATED ORAL at 06:44

## 2021-11-30 RX ADMIN — MIRTAZAPINE 15 MG: 15 TABLET, FILM COATED ORAL at 21:17

## 2021-11-30 RX ADMIN — INSULIN LISPRO 2 UNITS: 100 INJECTION, SOLUTION INTRAVENOUS; SUBCUTANEOUS at 13:40

## 2021-11-30 RX ADMIN — INSULIN LISPRO 3 UNITS: 100 INJECTION, SOLUTION INTRAVENOUS; SUBCUTANEOUS at 17:49

## 2021-11-30 RX ADMIN — INSULIN GLARGINE 10 UNITS: 100 INJECTION, SOLUTION SUBCUTANEOUS at 21:15

## 2021-11-30 RX ADMIN — INSULIN LISPRO 3 UNITS: 100 INJECTION, SOLUTION INTRAVENOUS; SUBCUTANEOUS at 09:39

## 2021-11-30 RX ADMIN — INSULIN LISPRO 2 UNITS: 100 INJECTION, SOLUTION INTRAVENOUS; SUBCUTANEOUS at 23:08

## 2021-11-30 RX ADMIN — INSULIN LISPRO 3 UNITS: 100 INJECTION, SOLUTION INTRAVENOUS; SUBCUTANEOUS at 13:40

## 2021-11-30 RX ADMIN — AMLODIPINE BESYLATE 10 MG: 10 TABLET ORAL at 09:38

## 2021-12-01 LAB
ANION GAP SERPL CALCULATED.3IONS-SCNC: 11 MMOL/L (ref 4–13)
BASOPHILS # BLD AUTO: 0.02 THOUSANDS/ΜL (ref 0–0.1)
BASOPHILS NFR BLD AUTO: 0 % (ref 0–1)
BUN SERPL-MCNC: 26 MG/DL (ref 6–20)
CALCIUM SERPL-MCNC: 9.8 MG/DL (ref 8.4–10.2)
CHLORIDE SERPL-SCNC: 104 MMOL/L (ref 96–108)
CO2 SERPL-SCNC: 25 MMOL/L (ref 22–33)
CREAT SERPL-MCNC: 0.49 MG/DL (ref 0.4–1.1)
EOSINOPHIL # BLD AUTO: 0.25 THOUSAND/ΜL (ref 0–0.61)
EOSINOPHIL NFR BLD AUTO: 2 % (ref 0–6)
ERYTHROCYTE [DISTWIDTH] IN BLOOD BY AUTOMATED COUNT: 13.5 % (ref 11.6–15.1)
FLUAV RNA RESP QL NAA+PROBE: NEGATIVE
FLUBV RNA RESP QL NAA+PROBE: NEGATIVE
GFR SERPL CREATININE-BSD FRML MDRD: 84 ML/MIN/1.73SQ M
GLUCOSE SERPL-MCNC: 109 MG/DL (ref 65–140)
GLUCOSE SERPL-MCNC: 192 MG/DL (ref 65–140)
GLUCOSE SERPL-MCNC: 219 MG/DL (ref 65–140)
GLUCOSE SERPL-MCNC: 274 MG/DL (ref 65–140)
GLUCOSE SERPL-MCNC: 304 MG/DL (ref 65–140)
HCT VFR BLD AUTO: 42.6 % (ref 34.8–46.1)
HGB BLD-MCNC: 14.4 G/DL (ref 11.5–15.4)
IMM GRANULOCYTES # BLD AUTO: 0.13 THOUSAND/UL (ref 0–0.2)
IMM GRANULOCYTES NFR BLD AUTO: 1 % (ref 0–2)
LYMPHOCYTES # BLD AUTO: 1.64 THOUSANDS/ΜL (ref 0.6–4.47)
LYMPHOCYTES NFR BLD AUTO: 15 % (ref 14–44)
MAGNESIUM SERPL-MCNC: 1.8 MG/DL (ref 1.6–2.6)
MCH RBC QN AUTO: 30.1 PG (ref 26.8–34.3)
MCHC RBC AUTO-ENTMCNC: 33.8 G/DL (ref 31.4–37.4)
MCV RBC AUTO: 89 FL (ref 82–98)
MONOCYTES # BLD AUTO: 0.94 THOUSAND/ΜL (ref 0.17–1.22)
MONOCYTES NFR BLD AUTO: 8 % (ref 4–12)
NEUTROPHILS # BLD AUTO: 8.22 THOUSANDS/ΜL (ref 1.85–7.62)
NEUTS SEG NFR BLD AUTO: 74 % (ref 43–75)
PLATELET # BLD AUTO: 391 THOUSANDS/UL (ref 149–390)
PMV BLD AUTO: 10 FL (ref 8.9–12.7)
POTASSIUM SERPL-SCNC: 3.5 MMOL/L (ref 3.5–5)
PROCALCITONIN SERPL-MCNC: 0.23 NG/ML
RBC # BLD AUTO: 4.78 MILLION/UL (ref 3.81–5.12)
RSV RNA RESP QL NAA+PROBE: NEGATIVE
SARS-COV-2 RNA RESP QL NAA+PROBE: NEGATIVE
SODIUM SERPL-SCNC: 140 MMOL/L (ref 133–145)
WBC # BLD AUTO: 11.2 THOUSAND/UL (ref 4.31–10.16)

## 2021-12-01 PROCEDURE — 82948 REAGENT STRIP/BLOOD GLUCOSE: CPT

## 2021-12-01 PROCEDURE — 80048 BASIC METABOLIC PNL TOTAL CA: CPT | Performed by: PHYSICIAN ASSISTANT

## 2021-12-01 PROCEDURE — 83735 ASSAY OF MAGNESIUM: CPT | Performed by: PHYSICIAN ASSISTANT

## 2021-12-01 PROCEDURE — 84145 PROCALCITONIN (PCT): CPT | Performed by: PHYSICIAN ASSISTANT

## 2021-12-01 PROCEDURE — 99232 SBSQ HOSP IP/OBS MODERATE 35: CPT | Performed by: PHYSICIAN ASSISTANT

## 2021-12-01 PROCEDURE — 0241U HB NFCT DS VIR RESP RNA 4 TRGT: CPT | Performed by: PHYSICIAN ASSISTANT

## 2021-12-01 PROCEDURE — 85025 COMPLETE CBC W/AUTO DIFF WBC: CPT | Performed by: PHYSICIAN ASSISTANT

## 2021-12-01 PROCEDURE — 97116 GAIT TRAINING THERAPY: CPT

## 2021-12-01 PROCEDURE — 97530 THERAPEUTIC ACTIVITIES: CPT

## 2021-12-01 RX ORDER — INSULIN GLARGINE 100 [IU]/ML
12 INJECTION, SOLUTION SUBCUTANEOUS
Status: DISCONTINUED | OUTPATIENT
Start: 2021-12-01 | End: 2021-12-02 | Stop reason: HOSPADM

## 2021-12-01 RX ORDER — GUAIFENESIN/DEXTROMETHORPHAN 100-10MG/5
10 SYRUP ORAL EVERY 4 HOURS PRN
Status: DISCONTINUED | OUTPATIENT
Start: 2021-12-01 | End: 2021-12-02 | Stop reason: HOSPADM

## 2021-12-01 RX ORDER — LISINOPRIL 5 MG/1
5 TABLET ORAL DAILY
Status: DISCONTINUED | OUTPATIENT
Start: 2021-12-01 | End: 2021-12-02 | Stop reason: HOSPADM

## 2021-12-01 RX ADMIN — LISINOPRIL 5 MG: 5 TABLET ORAL at 13:07

## 2021-12-01 RX ADMIN — LEVOTHYROXINE SODIUM 25 MCG: 25 TABLET ORAL at 05:00

## 2021-12-01 RX ADMIN — ENOXAPARIN SODIUM 40 MG: 40 INJECTION SUBCUTANEOUS at 09:37

## 2021-12-01 RX ADMIN — AMLODIPINE BESYLATE 10 MG: 10 TABLET ORAL at 09:41

## 2021-12-01 RX ADMIN — GUAIFENESIN 600 MG: 600 TABLET ORAL at 09:37

## 2021-12-01 RX ADMIN — INSULIN LISPRO 4 UNITS: 100 INJECTION, SOLUTION INTRAVENOUS; SUBCUTANEOUS at 13:07

## 2021-12-01 RX ADMIN — ACETAMINOPHEN 975 MG: 325 TABLET, FILM COATED ORAL at 05:00

## 2021-12-01 RX ADMIN — SODIUM CHLORIDE, SODIUM LACTATE, POTASSIUM CHLORIDE, AND CALCIUM CHLORIDE 75 ML/HR: .6; .31; .03; .02 INJECTION, SOLUTION INTRAVENOUS at 04:02

## 2021-12-01 RX ADMIN — ACETAMINOPHEN 975 MG: 325 TABLET, FILM COATED ORAL at 13:07

## 2021-12-01 RX ADMIN — INSULIN LISPRO 4 UNITS: 100 INJECTION, SOLUTION INTRAVENOUS; SUBCUTANEOUS at 16:56

## 2021-12-01 RX ADMIN — INSULIN LISPRO 2 UNITS: 100 INJECTION, SOLUTION INTRAVENOUS; SUBCUTANEOUS at 09:37

## 2021-12-01 RX ADMIN — CEFTRIAXONE 1000 MG: 1 INJECTION, SOLUTION INTRAVENOUS at 04:01

## 2021-12-02 VITALS
DIASTOLIC BLOOD PRESSURE: 69 MMHG | HEIGHT: 59 IN | SYSTOLIC BLOOD PRESSURE: 132 MMHG | OXYGEN SATURATION: 95 % | BODY MASS INDEX: 27.64 KG/M2 | WEIGHT: 137.13 LBS | TEMPERATURE: 97.7 F | HEART RATE: 74 BPM | RESPIRATION RATE: 17 BRPM

## 2021-12-02 LAB
ANION GAP SERPL CALCULATED.3IONS-SCNC: 8 MMOL/L (ref 4–13)
BASOPHILS # BLD AUTO: 0.03 THOUSANDS/ΜL (ref 0–0.1)
BASOPHILS NFR BLD AUTO: 0 % (ref 0–1)
BUN SERPL-MCNC: 24 MG/DL (ref 6–20)
CALCIUM SERPL-MCNC: 9.3 MG/DL (ref 8.4–10.2)
CHLORIDE SERPL-SCNC: 103 MMOL/L (ref 96–108)
CO2 SERPL-SCNC: 27 MMOL/L (ref 22–33)
CREAT SERPL-MCNC: 0.53 MG/DL (ref 0.4–1.1)
EOSINOPHIL # BLD AUTO: 0.37 THOUSAND/ΜL (ref 0–0.61)
EOSINOPHIL NFR BLD AUTO: 3 % (ref 0–6)
ERYTHROCYTE [DISTWIDTH] IN BLOOD BY AUTOMATED COUNT: 13.4 % (ref 11.6–15.1)
GFR SERPL CREATININE-BSD FRML MDRD: 82 ML/MIN/1.73SQ M
GLUCOSE SERPL-MCNC: 176 MG/DL (ref 65–140)
GLUCOSE SERPL-MCNC: 206 MG/DL (ref 65–140)
GLUCOSE SERPL-MCNC: 290 MG/DL (ref 65–140)
HCT VFR BLD AUTO: 40.9 % (ref 34.8–46.1)
HGB BLD-MCNC: 13.5 G/DL (ref 11.5–15.4)
IMM GRANULOCYTES # BLD AUTO: 0.28 THOUSAND/UL (ref 0–0.2)
IMM GRANULOCYTES NFR BLD AUTO: 2 % (ref 0–2)
LYMPHOCYTES # BLD AUTO: 1.69 THOUSANDS/ΜL (ref 0.6–4.47)
LYMPHOCYTES NFR BLD AUTO: 15 % (ref 14–44)
MAGNESIUM SERPL-MCNC: 1.8 MG/DL (ref 1.6–2.6)
MCH RBC QN AUTO: 29.8 PG (ref 26.8–34.3)
MCHC RBC AUTO-ENTMCNC: 33 G/DL (ref 31.4–37.4)
MCV RBC AUTO: 90 FL (ref 82–98)
MONOCYTES # BLD AUTO: 0.87 THOUSAND/ΜL (ref 0.17–1.22)
MONOCYTES NFR BLD AUTO: 8 % (ref 4–12)
NEUTROPHILS # BLD AUTO: 8.3 THOUSANDS/ΜL (ref 1.85–7.62)
NEUTS SEG NFR BLD AUTO: 72 % (ref 43–75)
PLATELET # BLD AUTO: 396 THOUSANDS/UL (ref 149–390)
PMV BLD AUTO: 9.6 FL (ref 8.9–12.7)
POTASSIUM SERPL-SCNC: 3.3 MMOL/L (ref 3.5–5)
PROCALCITONIN SERPL-MCNC: 0.1 NG/ML
RBC # BLD AUTO: 4.53 MILLION/UL (ref 3.81–5.12)
SODIUM SERPL-SCNC: 138 MMOL/L (ref 133–145)
WBC # BLD AUTO: 11.54 THOUSAND/UL (ref 4.31–10.16)

## 2021-12-02 PROCEDURE — 85025 COMPLETE CBC W/AUTO DIFF WBC: CPT | Performed by: PHYSICIAN ASSISTANT

## 2021-12-02 PROCEDURE — 82948 REAGENT STRIP/BLOOD GLUCOSE: CPT

## 2021-12-02 PROCEDURE — 99239 HOSP IP/OBS DSCHRG MGMT >30: CPT | Performed by: PHYSICIAN ASSISTANT

## 2021-12-02 PROCEDURE — 97530 THERAPEUTIC ACTIVITIES: CPT

## 2021-12-02 PROCEDURE — 83735 ASSAY OF MAGNESIUM: CPT | Performed by: PHYSICIAN ASSISTANT

## 2021-12-02 PROCEDURE — 80048 BASIC METABOLIC PNL TOTAL CA: CPT | Performed by: PHYSICIAN ASSISTANT

## 2021-12-02 PROCEDURE — 97116 GAIT TRAINING THERAPY: CPT

## 2021-12-02 PROCEDURE — 84145 PROCALCITONIN (PCT): CPT | Performed by: PHYSICIAN ASSISTANT

## 2021-12-02 RX ORDER — POTASSIUM CHLORIDE 20 MEQ/1
40 TABLET, EXTENDED RELEASE ORAL ONCE
Status: DISCONTINUED | OUTPATIENT
Start: 2021-12-02 | End: 2021-12-02

## 2021-12-02 RX ORDER — INSULIN GLARGINE 100 [IU]/ML
12 INJECTION, SOLUTION SUBCUTANEOUS
Qty: 10 ML | Refills: 0
Start: 2021-12-02

## 2021-12-02 RX ORDER — ATORVASTATIN CALCIUM 20 MG/1
20 TABLET, FILM COATED ORAL
Refills: 0
Start: 2021-12-02

## 2021-12-02 RX ORDER — LISINOPRIL 5 MG/1
5 TABLET ORAL DAILY
Refills: 0
Start: 2021-12-03

## 2021-12-02 RX ORDER — POTASSIUM CHLORIDE 20MEQ/15ML
40 LIQUID (ML) ORAL ONCE
Status: COMPLETED | OUTPATIENT
Start: 2021-12-02 | End: 2021-12-02

## 2021-12-02 RX ORDER — ACETAMINOPHEN 325 MG/1
650 TABLET ORAL EVERY 6 HOURS PRN
Refills: 0
Start: 2021-12-02

## 2021-12-02 RX ORDER — ASPIRIN 81 MG/1
81 TABLET ORAL DAILY
Status: DISCONTINUED | OUTPATIENT
Start: 2021-12-02 | End: 2021-12-02 | Stop reason: HOSPADM

## 2021-12-02 RX ORDER — ATORVASTATIN CALCIUM 20 MG/1
20 TABLET, FILM COATED ORAL
Status: DISCONTINUED | OUTPATIENT
Start: 2021-12-02 | End: 2021-12-02 | Stop reason: HOSPADM

## 2021-12-02 RX ORDER — ASPIRIN 81 MG/1
81 TABLET ORAL DAILY
Refills: 0
Start: 2021-12-02

## 2021-12-02 RX ADMIN — ACETAMINOPHEN 975 MG: 325 TABLET, FILM COATED ORAL at 05:04

## 2021-12-02 RX ADMIN — ASPIRIN 81 MG: 81 TABLET, COATED ORAL at 12:29

## 2021-12-02 RX ADMIN — CEFTRIAXONE 1000 MG: 1 INJECTION, SOLUTION INTRAVENOUS at 02:53

## 2021-12-02 RX ADMIN — INSULIN LISPRO 4 UNITS: 100 INJECTION, SOLUTION INTRAVENOUS; SUBCUTANEOUS at 12:25

## 2021-12-02 RX ADMIN — LEVOTHYROXINE SODIUM 25 MCG: 25 TABLET ORAL at 05:04

## 2021-12-02 RX ADMIN — ENOXAPARIN SODIUM 40 MG: 40 INJECTION SUBCUTANEOUS at 09:36

## 2021-12-02 RX ADMIN — POTASSIUM CHLORIDE 40 MEQ: 20 SOLUTION ORAL at 12:24

## 2021-12-02 RX ADMIN — INSULIN LISPRO 2 UNITS: 100 INJECTION, SOLUTION INTRAVENOUS; SUBCUTANEOUS at 09:37

## 2021-12-02 RX ADMIN — LISINOPRIL 5 MG: 5 TABLET ORAL at 09:38

## 2021-12-02 RX ADMIN — AMLODIPINE BESYLATE 10 MG: 10 TABLET ORAL at 09:36

## 2021-12-03 LAB
BACTERIA BLD CULT: NORMAL
BACTERIA BLD CULT: NORMAL

## 2021-12-09 ENCOUNTER — OFFICE VISIT (OUTPATIENT)
Dept: OBGYN CLINIC | Facility: CLINIC | Age: 86
End: 2021-12-09

## 2021-12-09 ENCOUNTER — HOSPITAL ENCOUNTER (OUTPATIENT)
Dept: RADIOLOGY | Facility: HOSPITAL | Age: 86
Discharge: HOME | End: 2021-12-09
Payer: COMMERCIAL

## 2021-12-09 DIAGNOSIS — M79.672 PAIN IN LEFT FOOT: Primary | ICD-10-CM

## 2021-12-09 DIAGNOSIS — M79.672 PAIN IN LEFT FOOT: ICD-10-CM

## 2021-12-09 DIAGNOSIS — M25.572 PAIN, JOINT, ANKLE AND FOOT, LEFT: ICD-10-CM

## 2021-12-09 DIAGNOSIS — S92.345A CLOSED NONDISPLACED FRACTURE OF FOURTH METATARSAL BONE OF LEFT FOOT, INITIAL ENCOUNTER: ICD-10-CM

## 2021-12-09 PROCEDURE — 99203 OFFICE O/P NEW LOW 30 MIN: CPT | Performed by: PHYSICIAN ASSISTANT

## 2021-12-09 PROCEDURE — 73610 X-RAY EXAM OF ANKLE: CPT

## 2021-12-09 PROCEDURE — 73630 X-RAY EXAM OF FOOT: CPT

## 2021-12-10 ENCOUNTER — TELEPHONE (OUTPATIENT)
Dept: OBGYN CLINIC | Facility: HOSPITAL | Age: 86
End: 2021-12-10

## 2022-01-03 ENCOUNTER — TRANSITIONAL CARE MANAGEMENT (OUTPATIENT)
Dept: FAMILY MEDICINE CLINIC | Facility: CLINIC | Age: 87
End: 2022-01-03

## 2022-01-04 LAB — GLUCOSE SERPL-MCNC: 352 MG/DL (ref 65–140)

## 2022-03-12 DIAGNOSIS — E78.2 MIXED HYPERLIPIDEMIA: ICD-10-CM

## 2022-03-12 DIAGNOSIS — E03.8 OTHER SPECIFIED HYPOTHYROIDISM: ICD-10-CM

## 2022-03-12 DIAGNOSIS — I10 BENIGN ESSENTIAL HYPERTENSION: ICD-10-CM

## 2022-03-12 DIAGNOSIS — E11.9 TYPE 2 DIABETES MELLITUS WITHOUT COMPLICATION, WITHOUT LONG-TERM CURRENT USE OF INSULIN (HCC): ICD-10-CM

## 2022-03-14 RX ORDER — LEVOTHYROXINE SODIUM 0.03 MG/1
TABLET ORAL
Qty: 90 TABLET | Refills: 3 | Status: SHIPPED | OUTPATIENT
Start: 2022-03-14

## 2022-03-14 RX ORDER — AMLODIPINE BESYLATE 10 MG/1
TABLET ORAL
Qty: 90 TABLET | Refills: 3 | Status: SHIPPED | OUTPATIENT
Start: 2022-03-14

## 2022-04-26 NOTE — PROGRESS NOTES
Progress Note - Geriatric Medicine   Maru Koroma 80 y o  female MRN: [id number]  Unit/Bed#: S -01 Encounter: [id number]      Assessment/Plan:  Urinary frequency  Assessment & Plan  Reviewed UA results, not suggestive of UTI  Continue to encourage p o  Intake    Depression  Assessment & Plan  Patient reports feeling sad, states that she does not want to live long, she does not want to be a burden for her children  Had minimal p o  Intake today, very tearful  I contacted her daughter Raymond Tamika and encouraged family to get in touch with her and reassure her  Continue Lexapro 5 mg daily and monitor closely  I would recommend starting mirtazapine 7 5 mg q h s  For insomnia appetite  Continue supportive care  Other insomnia  Assessment & Plan  The patient reports insomnia at baseline, continue melatonin 3 mg p o  Q h s   Avoid caffeine especially in the afternoon  Encourage sleep hygiene  Fall  Assessment & Plan  Most likely mechanical fall  Patient uses walker for ambulation at baseline  She refused physical therapy today  Consider monitoring orthostatic vital signs  Monitor for dizziness and may try meclizine 12 5 mg b i d  P r n  Encourage p o  Intake  Avoid hypotension and hypoglycemia      Allergic conjunctivitis of both eyes  Assessment & Plan  Patient reports that  my eyes are crying "  Continue olopatadine eye drops  Follow-up with Ophthalmology as outpatient    Type II or unspecified type diabetes mellitus without mention of complication, not stated as uncontrolled  Assessment & Plan  Lab Results   Component Value Date    HGBA1C 10 2 (A) 06/09/2021       Recent Labs     06/27/21  1814 06/28/21  0025 06/28/21  0530   POCGLU 310* 254* 187*       Blood Sugar Average: Last 72 hrs:  (P) 132 [id number]     Currently uncontrolled her last hemoglobin A1c was 10 2 in June 2021  Continue to monitor her Accu-Cheks  And continue sliding scale consider adding long-term insulin    Acceptable level The pt. called the call center and informed them that she needs her MRSA PCR test results sent to  Jessie @ [facility]. Send via fax to [phone].   for hemoglobin A1c is around 8 5  Consider checking TSH and vitamin B12 level  * Subdural hematoma (HCC)  Assessment & Plan  Status post fall  Stable  Neurosurgery follows  Continue to hold anticoagulation  Monitor neuro checks  Subjective:     Patient seen and examined at bedside, very tearful today, as per staff she had minimal p o  Intake  Patient reports pain around her frontal hematoma, denies abdominal pain dysuria fever chills  Stated that she feels sad and she misses her family  I discussed with her daughter [facility] over the phone, encouraged family to visit  Review of Systems   Constitutional: Negative for chills and fever  HENT: Positive for hearing loss  Negative for congestion and rhinorrhea  Respiratory: Negative for cough, shortness of breath and wheezing  Cardiovascular: Negative for chest pain, palpitations and leg swelling  Gastrointestinal: Positive for constipation  Negative for abdominal pain  Endocrine: Negative for cold intolerance  Genitourinary: Negative for difficulty urinating, dysuria and hematuria  Musculoskeletal: Positive for gait problem  Skin: Negative for wound  Allergic/Immunologic: Negative for environmental allergies  Neurological: Negative for dizziness  Hematological: Bruises/bleeds easily  Psychiatric/Behavioral: Positive for dysphoric mood  Negative for sleep disturbance  Objective:     Vitals: Blood pressure 152/55, pulse 59, temperature 98 1 °F (36 7 °C), resp  rate 16, SpO2 94 %  ,There is no height or weight on file to calculate BMI  Intake/Output Summary (Last 24 hours) at 6/29/2021 1413  Last data filed at 6/29/2021 0900  Gross per 24 hour   Intake 0 ml   Output 530 ml   Net -530 ml       Current Medications: Reviewed    Physical Exam:   Physical Exam  Vitals and nursing note reviewed  Constitutional:       General: She is not in acute distress  Appearance: She is well-developed  HENT:      Head: Normocephalic  Comments: Hematoma left forehead  B/l eye ecchymosis   Eyes:      Conjunctiva/sclera: Conjunctivae normal    Cardiovascular:      Rate and Rhythm: Normal rate and regular rhythm  Heart sounds: No murmur heard  Pulmonary:      Effort: Pulmonary effort is normal  No respiratory distress  Breath sounds: Normal breath sounds  Abdominal:      Palpations: Abdomen is soft  Tenderness: There is no abdominal tenderness  Musculoskeletal:      Cervical back: Neck supple  Right lower leg: No edema  Left lower leg: No edema  Skin:     General: Skin is warm and dry  Comments: Skin tear noted forehead hematoma   Neurological:      Mental Status: She is alert  Comments: AAOx1   Psychiatric:      Comments: Dysphoric mood          Invasive Devices     Peripheral Intravenous Line            Peripheral IV 06/29/21 Right Antecubital <1 day          Drain            External Urinary Catheter 1 day                Lab, Imaging and other studies: I have personally reviewed pertinent reports